# Patient Record
Sex: MALE | Race: WHITE | NOT HISPANIC OR LATINO | URBAN - METROPOLITAN AREA
[De-identification: names, ages, dates, MRNs, and addresses within clinical notes are randomized per-mention and may not be internally consistent; named-entity substitution may affect disease eponyms.]

---

## 2017-08-24 ENCOUNTER — ON CAMPUS - OUTPATIENT (AMBULATORY)
Dept: URBAN - METROPOLITAN AREA HOSPITAL 2 | Facility: HOSPITAL | Age: 57
End: 2017-08-24
Payer: COMMERCIAL

## 2017-08-24 ENCOUNTER — OFFICE (AMBULATORY)
Dept: URBAN - METROPOLITAN AREA CLINIC 64 | Facility: CLINIC | Age: 57
End: 2017-08-24

## 2017-08-24 VITALS
DIASTOLIC BLOOD PRESSURE: 79 MMHG | OXYGEN SATURATION: 94 % | SYSTOLIC BLOOD PRESSURE: 127 MMHG | SYSTOLIC BLOOD PRESSURE: 92 MMHG | DIASTOLIC BLOOD PRESSURE: 65 MMHG | RESPIRATION RATE: 18 BRPM | HEART RATE: 71 BPM | OXYGEN SATURATION: 100 % | OXYGEN SATURATION: 96 % | OXYGEN SATURATION: 95 % | SYSTOLIC BLOOD PRESSURE: 113 MMHG | SYSTOLIC BLOOD PRESSURE: 129 MMHG | WEIGHT: 267 LBS | HEIGHT: 70 IN | SYSTOLIC BLOOD PRESSURE: 133 MMHG | HEART RATE: 70 BPM | SYSTOLIC BLOOD PRESSURE: 155 MMHG | HEART RATE: 75 BPM | SYSTOLIC BLOOD PRESSURE: 85 MMHG | DIASTOLIC BLOOD PRESSURE: 86 MMHG | DIASTOLIC BLOOD PRESSURE: 62 MMHG | DIASTOLIC BLOOD PRESSURE: 74 MMHG | SYSTOLIC BLOOD PRESSURE: 112 MMHG | SYSTOLIC BLOOD PRESSURE: 120 MMHG | DIASTOLIC BLOOD PRESSURE: 57 MMHG | RESPIRATION RATE: 17 BRPM | HEART RATE: 78 BPM | TEMPERATURE: 98 F | HEART RATE: 66 BPM | HEART RATE: 73 BPM | DIASTOLIC BLOOD PRESSURE: 77 MMHG | OXYGEN SATURATION: 99 % | RESPIRATION RATE: 16 BRPM | DIASTOLIC BLOOD PRESSURE: 90 MMHG | SYSTOLIC BLOOD PRESSURE: 123 MMHG | DIASTOLIC BLOOD PRESSURE: 41 MMHG | HEART RATE: 74 BPM

## 2017-08-24 DIAGNOSIS — D12.0 BENIGN NEOPLASM OF CECUM: ICD-10-CM

## 2017-08-24 DIAGNOSIS — K64.0 FIRST DEGREE HEMORRHOIDS: ICD-10-CM

## 2017-08-24 DIAGNOSIS — K57.32 DIVERTICULITIS OF LARGE INTESTINE WITHOUT PERFORATION OR ABS: ICD-10-CM

## 2017-08-24 DIAGNOSIS — K63.3 ULCER OF INTESTINE: ICD-10-CM

## 2017-08-24 DIAGNOSIS — D12.2 BENIGN NEOPLASM OF ASCENDING COLON: ICD-10-CM

## 2017-08-24 DIAGNOSIS — Z12.11 ENCOUNTER FOR SCREENING FOR MALIGNANT NEOPLASM OF COLON: ICD-10-CM

## 2017-08-24 DIAGNOSIS — K52.9 NONINFECTIVE GASTROENTERITIS AND COLITIS, UNSPECIFIED: ICD-10-CM

## 2017-08-24 LAB
GI HISTOLOGY: A. UNSPECIFIED: (no result)
GI HISTOLOGY: B. UNSPECIFIED: (no result)
GI HISTOLOGY: C. UNSPECIFIED: (no result)
GI HISTOLOGY: PDF REPORT: (no result)

## 2017-08-24 PROCEDURE — 45385 COLONOSCOPY W/LESION REMOVAL: CPT | Performed by: INTERNAL MEDICINE

## 2017-08-24 PROCEDURE — 88305 TISSUE EXAM BY PATHOLOGIST: CPT | Performed by: INTERNAL MEDICINE

## 2017-08-24 PROCEDURE — 45381 COLONOSCOPY SUBMUCOUS NJX: CPT | Performed by: INTERNAL MEDICINE

## 2017-08-24 PROCEDURE — 45380 COLONOSCOPY AND BIOPSY: CPT | Mod: 51,59 | Performed by: INTERNAL MEDICINE

## 2017-08-24 RX ORDER — CIPROFLOXACIN 500 MG/1
1000 TABLET, FILM COATED ORAL
Qty: 20 | Refills: 0 | Status: ACTIVE
Start: 2017-08-24

## 2017-08-24 RX ADMIN — PROPOFOL: 10 INJECTION, EMULSION INTRAVENOUS at 13:17

## 2017-11-10 ENCOUNTER — HOSPITAL ENCOUNTER (OUTPATIENT)
Dept: FAMILY MEDICINE CLINIC | Facility: CLINIC | Age: 57
Setting detail: SPECIMEN
Discharge: HOME | End: 2017-11-10
Attending: FAMILY MEDICINE | Admitting: FAMILY MEDICINE

## 2017-11-10 LAB
ALBUMIN SERPL-MCNC: 4 G/DL (ref 3.5–4.8)
ALBUMIN/GLOB SERPL: 1.4 {RATIO} (ref 1–1.7)
ALP SERPL-CCNC: 57 IU/L (ref 32–91)
ALT SERPL-CCNC: 31 IU/L (ref 17–63)
ANION GAP SERPL CALC-SCNC: 11.6 MMOL/L (ref 10–20)
AST SERPL-CCNC: 31 IU/L (ref 15–41)
BASOPHILS # BLD AUTO: 0.1 10*3/UL (ref 0–0.2)
BASOPHILS NFR BLD AUTO: 1 % (ref 0–2)
BILIRUB SERPL-MCNC: 1 MG/DL (ref 0.3–1.2)
BUN SERPL-MCNC: 15 MG/DL (ref 8–20)
BUN/CREAT SERPL: 13.6 (ref 6.2–20.3)
CALCIUM SERPL-MCNC: 9.1 MG/DL (ref 8.9–10.3)
CHLORIDE SERPL-SCNC: 108 MMOL/L (ref 101–111)
CHOLEST SERPL-MCNC: 227 MG/DL
CHOLEST/HDLC SERPL: 6.4 {RATIO}
CONV CO2: 22 MMOL/L (ref 22–32)
CONV LDL CHOLESTEROL DIRECT: 179 MG/DL (ref 0–100)
CONV TOTAL PROTEIN: 6.8 G/DL (ref 6.1–7.9)
CREAT UR-MCNC: 1.1 MG/DL (ref 0.7–1.2)
CRP SERPL-MCNC: 0.3 MG/DL (ref 0–0.7)
DIFFERENTIAL METHOD BLD: (no result)
EOSINOPHIL # BLD AUTO: 0.2 10*3/UL (ref 0–0.3)
EOSINOPHIL # BLD AUTO: 3 % (ref 0–3)
ERYTHROCYTE [DISTWIDTH] IN BLOOD BY AUTOMATED COUNT: 13.5 % (ref 11.5–14.5)
ERYTHROCYTE [SEDIMENTATION RATE] IN BLOOD BY WESTERGREN METHOD: 6 MM/HR (ref 0–20)
GLOBULIN UR ELPH-MCNC: 2.8 G/DL (ref 2.5–3.8)
GLUCOSE SERPL-MCNC: 146 MG/DL (ref 65–99)
HCT VFR BLD AUTO: 41.1 % (ref 40–54)
HDLC SERPL-MCNC: 36 MG/DL
HGB BLD-MCNC: 14.1 G/DL (ref 14–18)
LDLC/HDLC SERPL: 5 {RATIO}
LIPID INTERPRETATION: ABNORMAL
LYMPHOCYTES # BLD AUTO: 2.6 10*3/UL (ref 0.8–4.8)
LYMPHOCYTES NFR BLD AUTO: 39 % (ref 18–42)
MCH RBC QN AUTO: 31.2 PG (ref 26–32)
MCHC RBC AUTO-ENTMCNC: 34.2 G/DL (ref 32–36)
MCV RBC AUTO: 91 FL (ref 80–94)
MONOCYTES # BLD AUTO: 0.3 10*3/UL (ref 0.1–1.3)
MONOCYTES NFR BLD AUTO: 4 % (ref 2–11)
NEUTROPHILS # BLD AUTO: 3.6 10*3/UL (ref 2.3–8.6)
NEUTROPHILS NFR BLD AUTO: 53 % (ref 50–75)
NRBC BLD AUTO-RTO: 0 /100{WBCS}
NRBC/RBC NFR BLD MANUAL: 0 10*3/UL
PLATELET # BLD AUTO: 291 10*3/UL (ref 150–450)
PMV BLD AUTO: 9.5 FL (ref 7.4–10.4)
POTASSIUM SERPL-SCNC: 3.6 MMOL/L (ref 3.6–5.1)
RBC # BLD AUTO: 4.52 10*6/UL (ref 4.6–6)
SODIUM SERPL-SCNC: 138 MMOL/L (ref 136–144)
TRIGL SERPL-MCNC: 140 MG/DL
URATE SERPL-MCNC: 7.7 MG/DL (ref 4.8–8.7)
VLDLC SERPL CALC-MCNC: 12.6 MG/DL
WBC # BLD AUTO: 6.8 10*3/UL (ref 4.5–11.5)

## 2017-11-13 LAB
ANA SER QL IA: NORMAL
CHROMATIN AB SERPL-ACNC: <20 [IU]/ML (ref 0–20)

## 2017-11-14 LAB
ALBUMIN SERPL-MCNC: 3.9 G/DL (ref 3.5–4.8)
ALPHA1 GLOB FLD ELPH-MCNC: 0.2 GM/DL (ref 0.1–0.4)
ALPHA2 GLOB SERPL ELPH-MCNC: 0.7 GM/DL (ref 0.5–1)
B-GLOBULIN SERPL ELPH-MCNC: 1.3 GM/DL (ref 0.7–1.4)
CONV TOTAL PROTEIN: 6.9 G/DL (ref 6.1–7.9)
GAMMA GLOB SERPL ELPH-MCNC: 0.9 GM/DL (ref 0.6–1.6)
INSULIN SERPL-ACNC: NORMAL U[IU]/ML

## 2018-03-30 ENCOUNTER — HOSPITAL ENCOUNTER (OUTPATIENT)
Dept: CARDIOLOGY | Facility: HOSPITAL | Age: 58
Discharge: HOME | End: 2018-03-30
Attending: INTERNAL MEDICINE | Admitting: INTERNAL MEDICINE

## 2018-09-25 ENCOUNTER — HOSPITAL ENCOUNTER (OUTPATIENT)
Dept: OTHER | Facility: HOSPITAL | Age: 58
Discharge: HOME | End: 2018-09-25
Attending: SURGERY | Admitting: SURGERY

## 2018-12-10 ENCOUNTER — HOSPITAL ENCOUNTER (OUTPATIENT)
Dept: ORTHOPEDIC SURGERY | Facility: CLINIC | Age: 58
Discharge: HOME | End: 2018-12-10
Attending: ORTHOPAEDIC SURGERY | Admitting: ORTHOPAEDIC SURGERY

## 2019-07-31 PROBLEM — R12 HEARTBURN: Status: ACTIVE | Noted: 2018-09-17

## 2019-07-31 PROBLEM — E78.5 HYPERLIPIDEMIA: Status: ACTIVE | Noted: 2019-07-31

## 2019-07-31 PROBLEM — M19.91 PRIMARY LOCALIZED OSTEOARTHRITIS: Status: ACTIVE | Noted: 2018-12-10

## 2019-07-31 PROBLEM — M15.9 POLYOSTEOARTHRITIS: Status: ACTIVE | Noted: 2018-11-14

## 2019-07-31 PROBLEM — I25.10 CORONARY ARTERY DISEASE: Status: ACTIVE | Noted: 2019-07-31

## 2019-07-31 PROBLEM — I21.3 ST ELEVATION (STEMI) MYOCARDIAL INFARCTION: Status: ACTIVE | Noted: 2019-07-31

## 2019-07-31 PROBLEM — M17.9 OSTEOARTHRITIS OF KNEE: Status: ACTIVE | Noted: 2017-11-10

## 2019-07-31 PROBLEM — I10 HYPERTENSION: Status: ACTIVE | Noted: 2019-07-31

## 2019-07-31 PROBLEM — M25.569 KNEE PAIN: Status: ACTIVE | Noted: 2017-11-10

## 2019-07-31 PROBLEM — IMO0002 HYPOGONADISM: Status: ACTIVE | Noted: 2017-04-19

## 2019-07-31 PROBLEM — E66.9 OBESITY: Status: ACTIVE | Noted: 2018-08-01

## 2019-07-31 PROBLEM — M10.9 GOUT: Status: ACTIVE | Noted: 2017-11-10

## 2019-07-31 PROBLEM — Z01.818 PREOPERATIVE EVALUATION TO RULE OUT SURGICAL CONTRAINDICATION: Status: ACTIVE | Noted: 2018-09-25

## 2019-07-31 PROBLEM — E66.01 MORBID (SEVERE) OBESITY DUE TO EXCESS CALORIES: Status: ACTIVE | Noted: 2018-09-25

## 2019-07-31 RX ORDER — ROSUVASTATIN CALCIUM 10 MG/1
TABLET, COATED ORAL DAILY
COMMUNITY
Start: 2015-03-13 | End: 2019-09-19 | Stop reason: SDUPTHER

## 2019-07-31 RX ORDER — METOPROLOL TARTRATE 50 MG/1
TABLET, FILM COATED ORAL EVERY 12 HOURS
COMMUNITY
Start: 2015-03-13 | End: 2019-12-19 | Stop reason: SDUPTHER

## 2019-07-31 RX ORDER — MELOXICAM 7.5 MG/1
TABLET ORAL EVERY 24 HOURS
COMMUNITY
Start: 2019-01-15 | End: 2019-12-19 | Stop reason: SDUPTHER

## 2019-07-31 RX ORDER — AMLODIPINE BESYLATE 5 MG/1
TABLET ORAL EVERY 24 HOURS
COMMUNITY
Start: 2017-12-03 | End: 2019-10-21 | Stop reason: SDUPTHER

## 2019-07-31 RX ORDER — ESOMEPRAZOLE MAGNESIUM 40 MG/1
CAPSULE, DELAYED RELEASE ORAL DAILY
COMMUNITY
Start: 2015-03-13 | End: 2019-12-19 | Stop reason: SDUPTHER

## 2019-07-31 RX ORDER — CHLORAL HYDRATE 500 MG
1 CAPSULE ORAL EVERY 12 HOURS
COMMUNITY
Start: 2018-03-22 | End: 2020-06-25

## 2019-07-31 RX ORDER — ASPIRIN 325 MG
325 TABLET ORAL NIGHTLY
COMMUNITY
Start: 2014-02-05

## 2019-08-15 ENCOUNTER — OFFICE VISIT (OUTPATIENT)
Dept: CARDIOLOGY | Facility: CLINIC | Age: 59
End: 2019-08-15

## 2019-08-15 VITALS
BODY MASS INDEX: 43.09 KG/M2 | WEIGHT: 291.8 LBS | HEART RATE: 89 BPM | SYSTOLIC BLOOD PRESSURE: 120 MMHG | OXYGEN SATURATION: 96 % | DIASTOLIC BLOOD PRESSURE: 75 MMHG

## 2019-08-15 DIAGNOSIS — I25.708 CORONARY ARTERY DISEASE OF BYPASS GRAFT OF NATIVE HEART WITH STABLE ANGINA PECTORIS (HCC): Primary | ICD-10-CM

## 2019-08-15 DIAGNOSIS — I10 ESSENTIAL HYPERTENSION: ICD-10-CM

## 2019-08-15 DIAGNOSIS — E78.00 PURE HYPERCHOLESTEROLEMIA: ICD-10-CM

## 2019-08-15 PROCEDURE — 99213 OFFICE O/P EST LOW 20 MIN: CPT | Performed by: INTERNAL MEDICINE

## 2019-08-15 NOTE — PROGRESS NOTES
Subjective:     Encounter Date:08/15/2019      Patient ID: Flaco Yan is a 59 y.o. male.    Chief Complaint:  History of Present Illness 59-year-old white male with history of coronary artery disease status post coronary artery bypass surgery history of hypertension hyperlipidemia presents to my office for follow-up.  Patient has sleep apnea and does not use a CPAP machine.  Patient does not have any symptoms of chest pain but has some shortness of breath with exertion.  Complains of any PND orthopnea.  No palpitations dizziness syncope or swelling of the feet.  Patient has been taking all the medicines regularly.  He does not smoke.  He is not able to exercise very well.    The following portions of the patient's history were reviewed and updated as appropriate: allergies, current medications, past family history, past medical history, past social history, past surgical history and problem list.  Past Medical History:   Diagnosis Date   • Coronary artery disease    • Hyperlipidemia    • Hypertension      History reviewed. No pertinent surgical history.  /75 (BP Location: Left arm, Patient Position: Sitting)   Pulse 89   Wt 132 kg (291 lb 12.8 oz)   SpO2 96%   BMI 43.09 kg/m²   History reviewed. No pertinent family history.    Current Outpatient Medications:   •  amLODIPine (NORVASC) 5 MG tablet, Daily., Disp: , Rfl:   •  aspirin 325 MG tablet, Take  by mouth Daily., Disp: , Rfl:   •  esomeprazole (NEXIUM) 20 MG capsule, NEXIUM 22.5 MG CPDR (ESOMEPRAZOLE MAGNESIUM) OTC, Disp: , Rfl:   •  metoprolol tartrate (LOPRESSOR) 50 MG tablet, Every 12 (Twelve) Hours., Disp: , Rfl:   •  Omega-3 Fatty Acids (FISH OIL) 1000 MG capsule capsule, 1 capsule Every 12 (Twelve) Hours., Disp: , Rfl:   •  esomeprazole (NEXIUM) 40 MG capsule, Take  by mouth Daily., Disp: , Rfl:   •  meloxicam (MOBIC) 7.5 MG tablet, Daily., Disp: , Rfl:   •  rosuvastatin (CRESTOR) 10 MG tablet, Take  by mouth Daily., Disp: , Rfl:   No  Known Allergies  Social History     Socioeconomic History   • Marital status:      Spouse name: Not on file   • Number of children: Not on file   • Years of education: Not on file   • Highest education level: Not on file   Tobacco Use   • Smoking status: Former Smoker   Substance and Sexual Activity   • Alcohol use: No     Frequency: Never     Review of Systems   Constitution: Positive for malaise/fatigue. Negative for fever.   Cardiovascular: Negative for chest pain, dyspnea on exertion, leg swelling and palpitations.   Respiratory: Negative for cough and shortness of breath.    Skin: Negative for rash.   Gastrointestinal: Negative for abdominal pain, nausea and vomiting.   Neurological: Negative for dizziness, focal weakness, headaches and light-headedness.   All other systems reviewed and are negative.             Objective:     Physical Exam   Constitutional: He appears well-developed and well-nourished.   HENT:   Head: Normocephalic and atraumatic.   Eyes: Conjunctivae are normal. No scleral icterus.   Neck: Normal range of motion. Neck supple. No JVD present. Carotid bruit is not present.   Cardiovascular: Normal rate, regular rhythm, S1 normal, S2 normal, normal heart sounds and intact distal pulses. PMI is not displaced.   Pulmonary/Chest: Effort normal and breath sounds normal. He has no wheezes. He has no rales.   Abdominal: Soft. Bowel sounds are normal.   Neurological: He is alert. He has normal strength.   Skin: Skin is warm and dry. No rash noted.     Procedures    Lab Review:       Assessment:          Diagnosis Plan   1. Coronary artery disease of bypass graft of native heart with stable angina pectoris (CMS/HCC)     2. Essential hypertension     3. Pure hypercholesterolemia       #4 obstructive sleep apnea     Plan:       Patient had coronary bypass surgery with a LIMA to LAD and saphenous graft to the marginal branch and the RCA.  Patient has normal LV function per  Patient has sleep apnea  but does not use a CPAP machine.  Patient has hyperlipidemia and does not use statins very well.  He uses over-the-counter medicines per  Patient will have lipid profile performed and will be started on a statin.  Continue current medicines and follow-up in 6 months

## 2019-09-19 ENCOUNTER — OFFICE VISIT (OUTPATIENT)
Dept: FAMILY MEDICINE CLINIC | Facility: CLINIC | Age: 59
End: 2019-09-19

## 2019-09-19 VITALS
HEART RATE: 69 BPM | SYSTOLIC BLOOD PRESSURE: 158 MMHG | RESPIRATION RATE: 18 BRPM | DIASTOLIC BLOOD PRESSURE: 102 MMHG | WEIGHT: 290 LBS | BODY MASS INDEX: 41.52 KG/M2 | OXYGEN SATURATION: 96 % | TEMPERATURE: 97.9 F | HEIGHT: 70 IN

## 2019-09-19 DIAGNOSIS — E78.01 FAMILIAL HYPERCHOLESTEROLEMIA: Primary | ICD-10-CM

## 2019-09-19 DIAGNOSIS — E34.9 TESTOSTERONE DEFICIENCY: ICD-10-CM

## 2019-09-19 PROCEDURE — 99213 OFFICE O/P EST LOW 20 MIN: CPT | Performed by: FAMILY MEDICINE

## 2019-09-19 RX ORDER — ROSUVASTATIN CALCIUM 5 MG/1
5 TABLET, COATED ORAL NIGHTLY
Qty: 30 TABLET | Refills: 11 | Status: SHIPPED | OUTPATIENT
Start: 2019-09-19 | End: 2020-09-11

## 2019-09-19 RX ORDER — TADALAFIL 20 MG/1
20 TABLET ORAL DAILY PRN
Qty: 5 TABLET | Refills: 5 | Status: SHIPPED | OUTPATIENT
Start: 2019-09-19 | End: 2021-06-28

## 2019-09-19 NOTE — PROGRESS NOTES
Rooming Tab(CC,VS,Pt Hx,Fall Screen)  Chief Complaint   Patient presents with   • discuss cardiology labs       Subjective    Cardiology sent him over because he stopped the chol meds a year ago.  He is needing to get back on it.    Also needs to try some testosterone and /or cialis for ED.   We will discuss options and risks and benefits of both.             I have reviewed and updated his medications, medical history and problem list during today's office visit.     Patient Care Team:  Mike Redd MD as PCP - General (Family Medicine)    Problem List Tab  Medications Tab  Synopsis Tab  Chart Review Tab  Care Everywhere Tab  Immunizations Tab  Patient History Tab    Social History     Tobacco Use   • Smoking status: Former Smoker   Substance Use Topics   • Alcohol use: No     Frequency: Never       Review of Systems   Constitutional: Negative.  Negative for appetite change, diaphoresis, fatigue, fever and unexpected weight loss.   HENT: Negative.  Negative for congestion, sinus pressure and sore throat.    Eyes: Negative.  Negative for blurred vision, double vision and itching.   Respiratory: Negative.  Negative for cough and shortness of breath.    Cardiovascular: Negative.  Negative for chest pain and palpitations.   Gastrointestinal: Negative.    Genitourinary: Negative for difficulty urinating, frequency and urinary incontinence.   Musculoskeletal: Positive for arthralgias and myalgias. Negative for back pain, joint swelling and neck pain.   Skin: Negative for dry skin and rash.   Allergic/Immunologic: Positive for environmental allergies.   Neurological: Negative.  Negative for dizziness, tremors and syncope.   Hematological: Negative.  Does not bruise/bleed easily.   Psychiatric/Behavioral: Negative.  Negative for depressed mood. The patient is not nervous/anxious.    All other systems reviewed and are negative.      Objective     Rooming Tab(CC,VS,Pt Hx,Fall Screen)  BP (!) 158/102 (BP Location: Left  "arm, Patient Position: Sitting, Cuff Size: Large Adult)   Pulse 69   Temp 97.9 °F (36.6 °C) (Oral)   Resp 18   Ht 177.8 cm (70\")   Wt 132 kg (290 lb)   SpO2 96%   BMI 41.61 kg/m²     Body mass index is 41.61 kg/m².    Physical Exam   Constitutional: He is oriented to person, place, and time. He appears well-developed and well-nourished.   HENT:   Head: Normocephalic and atraumatic.   Right Ear: External ear normal.   Left Ear: External ear normal.   Nose: Nose normal.   Mouth/Throat: Oropharynx is clear and moist.   Eyes: Conjunctivae and EOM are normal. Pupils are equal, round, and reactive to light.   Neck: Normal range of motion. Neck supple.   Cardiovascular: Normal rate, regular rhythm, normal heart sounds and intact distal pulses.   Pulmonary/Chest: Effort normal and breath sounds normal.   Abdominal: Soft. Bowel sounds are normal.   overweight   Musculoskeletal: Normal range of motion.   Neurological: He is alert and oriented to person, place, and time.   Skin: Skin is warm and dry.   Psychiatric: He has a normal mood and affect. His behavior is normal. Judgment and thought content normal.   Vitals reviewed.       Statin Choice Calculator  Data Reviewed:                   Assessment/Plan   Order Review Tab  Health Maintenance Tab  Patient Plan/Order Tab  Diagnoses and all orders for this visit:    1. Familial hypercholesterolemia (Primary)  Assessment & Plan:  Lipid abnormalities are improving with treatment.  Nutritional counseling was provided. and The patient was referred to the dietician.  Lipids will be reassessed in 3 months.  Low carb diet  Crestor 5mg qd.      2. Testosterone deficiency  Assessment & Plan:  Trial with  20mg Cialis.     If not helpful then try testosterone IM along with it      Other orders  -     rosuvastatin (CRESTOR) 5 MG tablet; Take 1 tablet by mouth Every Night for 30 days.  Dispense: 30 tablet; Refill: 11  -     tadalafil (CIALIS) 20 MG tablet; Take 1 tablet by mouth " Daily As Needed for erectile dysfunction.  Dispense: 5 tablet; Refill: 5      Wrapup Tab  Return in about 3 months (around 12/19/2019).

## 2019-09-19 NOTE — ASSESSMENT & PLAN NOTE
Lipid abnormalities are improving with treatment.  Nutritional counseling was provided. and The patient was referred to the dietician.  Lipids will be reassessed in 3 months.  Low carb diet  Crestor 5mg qd.

## 2019-10-21 RX ORDER — AMLODIPINE BESYLATE 5 MG/1
5 TABLET ORAL EVERY 24 HOURS
Qty: 90 TABLET | Refills: 3 | Status: SHIPPED | OUTPATIENT
Start: 2019-10-21 | End: 2019-10-21 | Stop reason: SDUPTHER

## 2019-10-21 RX ORDER — AMLODIPINE BESYLATE 5 MG/1
5 TABLET ORAL EVERY 24 HOURS
Qty: 90 TABLET | Refills: 3 | Status: SHIPPED | OUTPATIENT
Start: 2019-10-21 | End: 2021-01-25

## 2019-10-30 ENCOUNTER — TELEPHONE (OUTPATIENT)
Dept: FAMILY MEDICINE CLINIC | Facility: CLINIC | Age: 59
End: 2019-10-30

## 2019-10-30 NOTE — TELEPHONE ENCOUNTER
Patient went to get his labs done this morning for his appt. Tomorrow with Dr. Redd, but he was not fasting so he didn't have them done. He is asking if he should keep his appt. Tomorrow at 8:30 am. Neda, can you let me know? He is scheduled for 6 week F/U. Thank you.

## 2019-10-30 NOTE — TELEPHONE ENCOUNTER
According to the ov in sept he was to have lipid level check in 3 months. Not sure what the appt was for. He is over due for a physical if he wants to schedule one in the future.

## 2019-12-19 ENCOUNTER — OFFICE VISIT (OUTPATIENT)
Dept: FAMILY MEDICINE CLINIC | Facility: CLINIC | Age: 59
End: 2019-12-19

## 2019-12-19 VITALS
DIASTOLIC BLOOD PRESSURE: 90 MMHG | BODY MASS INDEX: 42.66 KG/M2 | HEIGHT: 70 IN | TEMPERATURE: 97.8 F | WEIGHT: 298 LBS | HEART RATE: 72 BPM | RESPIRATION RATE: 18 BRPM | OXYGEN SATURATION: 99 % | SYSTOLIC BLOOD PRESSURE: 150 MMHG

## 2019-12-19 DIAGNOSIS — E78.01 FAMILIAL HYPERCHOLESTEROLEMIA: Primary | ICD-10-CM

## 2019-12-19 DIAGNOSIS — I10 ESSENTIAL HYPERTENSION: ICD-10-CM

## 2019-12-19 DIAGNOSIS — E66.3 OVERWEIGHT: ICD-10-CM

## 2019-12-19 PROCEDURE — 99213 OFFICE O/P EST LOW 20 MIN: CPT | Performed by: FAMILY MEDICINE

## 2019-12-19 RX ORDER — MELOXICAM 15 MG/1
TABLET ORAL
COMMUNITY
Start: 2019-11-12 | End: 2020-04-02 | Stop reason: SDUPTHER

## 2019-12-19 RX ORDER — METOPROLOL TARTRATE 50 MG/1
50 TABLET, FILM COATED ORAL 2 TIMES DAILY
Qty: 60 TABLET | Refills: 6 | Status: SHIPPED | OUTPATIENT
Start: 2019-12-19 | End: 2022-12-19

## 2019-12-19 RX ORDER — ROSUVASTATIN CALCIUM 5 MG/1
TABLET, COATED ORAL
COMMUNITY
Start: 2019-11-17 | End: 2020-06-11

## 2019-12-19 NOTE — ASSESSMENT & PLAN NOTE
Obesity is unchanged.  Discussed the patient's BMI.  The BMI is above average; BMI management plan is completed.  General weight loss/lifestyle modification strategies discussed (elicit support from others; identify saboteurs; non-food rewards, etc).  Behavioral treatment: commercial programs (weight watchers).  Diet interventions: low calorie (1000 kCal/d) deficit diet.  Informal exercise measures discussed, e.g. taking stairs instead of elevator.  Regular aerobic exercise program discussed.  Pharmacotherapy as ordered.

## 2019-12-19 NOTE — ASSESSMENT & PLAN NOTE
Hypertension is improving with treatment.  Continue current treatment regimen.  Dietary sodium restriction.  Weight loss.  Regular aerobic exercise.  Stop smoking.  Continue current medications.  Medication changes per orders.  Blood pressure will be reassessed in 3 months.    Cont meds.  I need some readings out of the office.  If he is really running high out of the office I will adjust his dose.

## 2019-12-19 NOTE — PROGRESS NOTES
Rooming Tab(CC,VS,Pt Hx,Fall Screen)  Chief Complaint   Patient presents with   • Hyperlipidemia     f/u       Subjective    Doing well.  He is aching again from the crestor 5mg qd.   They have his chol down to perfect though.   We need to find a way to get him to tolerate this.      I have reviewed and updated his medications, medical history and problem list during today's office visit.     Patient Care Team:  Mike Redd MD as PCP - General (Family Medicine)    Problem List Tab  Medications Tab  Synopsis Tab  Chart Review Tab  Care Everywhere Tab  Immunizations Tab  Patient History Tab    Social History     Tobacco Use   • Smoking status: Former Smoker     Last attempt to quit: 1999     Years since quittin.0   • Smokeless tobacco: Never Used   Substance Use Topics   • Alcohol use: No     Frequency: Never       Review of Systems   Constitutional: Negative.  Negative for diaphoresis, fatigue and fever.   HENT: Negative.  Negative for congestion, hearing loss, sinus pressure, tinnitus and trouble swallowing.    Eyes: Negative.    Respiratory: Negative.  Negative for cough, choking, chest tightness, shortness of breath and wheezing.    Cardiovascular: Negative.  Negative for chest pain and palpitations.   Gastrointestinal: Negative.  Negative for abdominal distention, abdominal pain and GERD.   Endocrine: Negative.    Genitourinary: Negative.  Negative for frequency.   Musculoskeletal: Negative.  Negative for arthralgias, gait problem, joint swelling, myalgias and neck stiffness.   Skin: Negative.  Negative for rash.   Allergic/Immunologic: Negative.  Negative for environmental allergies.   Neurological: Negative.  Negative for syncope and speech difficulty.   Hematological: Negative.  Negative for adenopathy.   Psychiatric/Behavioral: Negative for stress.   All other systems reviewed and are negative.      Objective     Rooming Tab(CC,VS,Pt Hx,Fall Screen)  /90 (BP Location: Left arm)    "Pulse 72   Temp 97.8 °F (36.6 °C) (Oral)   Resp 18   Ht 177.8 cm (70\")   Wt 135 kg (298 lb)   SpO2 99%   BMI 42.76 kg/m²     Body mass index is 42.76 kg/m².    Physical Exam   Constitutional: He is oriented to person, place, and time. He appears well-developed and well-nourished.   HENT:   Head: Normocephalic and atraumatic.   Right Ear: External ear normal.   Left Ear: External ear normal.   Nose: Nose normal.   Mouth/Throat: Oropharynx is clear and moist. No oropharyngeal exudate.   Eyes: Pupils are equal, round, and reactive to light. Conjunctivae and EOM are normal. Right eye exhibits no discharge. Left eye exhibits no discharge. No scleral icterus.   Neck: Normal range of motion. Neck supple. No JVD present. No thyromegaly present.   Cardiovascular: Normal rate, regular rhythm, normal heart sounds and intact distal pulses.   No murmur heard.  Pulmonary/Chest: Effort normal and breath sounds normal. No respiratory distress. He has no wheezes. He has no rales. He exhibits no tenderness.   Abdominal: Soft. Bowel sounds are normal. He exhibits no distension. There is no tenderness.   Genitourinary: Rectal exam shows guaiac negative stool.   Musculoskeletal: Normal range of motion. He exhibits no edema, tenderness or deformity.   Lymphadenopathy:     He has no cervical adenopathy.   Neurological: He is alert and oriented to person, place, and time.   Skin: Skin is warm and dry.   Psychiatric: He has a normal mood and affect. His behavior is normal. Judgment and thought content normal.   Vitals reviewed.       Statin Choice Calculator  Data Reviewed:                   Assessment/Plan   Order Review Tab  Health Maintenance Tab  Patient Plan/Order Tab  Diagnoses and all orders for this visit:    1. Familial hypercholesterolemia (Primary)  Assessment & Plan:  Lipid abnormalities are improving with treatment.  Nutritional counseling was provided., The patient is on low carb diet.   Crestor is working great but he " is very aching.       We will try to cut his dose to tiw.  The meds work but we need to find a way to tolerate them.      2. Essential hypertension  Assessment & Plan:  Hypertension is improving with treatment.  Continue current treatment regimen.  Dietary sodium restriction.  Weight loss.  Regular aerobic exercise.  Stop smoking.  Continue current medications.  Medication changes per orders.  Blood pressure will be reassessed in 3 months.    Cont meds.  I need some readings out of the office.  If he is really running high out of the office I will adjust his dose.      3. Overweight  Assessment & Plan:  Obesity is unchanged.  Discussed the patient's BMI.  The BMI is above average; BMI management plan is completed.  General weight loss/lifestyle modification strategies discussed (elicit support from others; identify saboteurs; non-food rewards, etc).  Behavioral treatment: commercial programs (weight watchers).  Diet interventions: low calorie (1000 kCal/d) deficit diet.  Informal exercise measures discussed, e.g. taking stairs instead of elevator.  Regular aerobic exercise program discussed.  Pharmacotherapy as ordered.      Other orders  -     metoprolol tartrate (LOPRESSOR) 50 MG tablet; Take 1 tablet by mouth 2 (Two) Times a Day for 30 days.  Dispense: 60 tablet; Refill: 6      Wrapup Tab  Return in about 4 months (around 4/19/2020).

## 2019-12-19 NOTE — ASSESSMENT & PLAN NOTE
Lipid abnormalities are improving with treatment.  Nutritional counseling was provided., The patient is on low carb diet.   Crestor is working great but he is very aching.       We will try to cut his dose to tiw.  The meds work but we need to find a way to tolerate them.

## 2020-03-04 ENCOUNTER — HOSPITAL ENCOUNTER (EMERGENCY)
Facility: HOSPITAL | Age: 60
Discharge: HOME | End: 2020-03-04
Attending: EMERGENCY MEDICINE | Admitting: EMERGENCY MEDICINE

## 2020-03-04 ENCOUNTER — TELEPHONE (OUTPATIENT)
Dept: FAMILY MEDICINE CLINIC | Facility: CLINIC | Age: 60
End: 2020-03-04

## 2020-03-04 VITALS
TEMPERATURE: 97.5 F | WEIGHT: 299.38 LBS | BODY MASS INDEX: 42.86 KG/M2 | HEIGHT: 70 IN | RESPIRATION RATE: 17 BRPM | DIASTOLIC BLOOD PRESSURE: 95 MMHG | SYSTOLIC BLOOD PRESSURE: 152 MMHG | HEART RATE: 80 BPM | OXYGEN SATURATION: 99 %

## 2020-03-04 DIAGNOSIS — G56.22 ULNAR NEUROPATHY AT ELBOW OF LEFT UPPER EXTREMITY: Primary | ICD-10-CM

## 2020-03-04 LAB — GLUCOSE BLDC GLUCOMTR-MCNC: 108 MG/DL (ref 70–105)

## 2020-03-04 PROCEDURE — 99283 EMERGENCY DEPT VISIT LOW MDM: CPT

## 2020-03-04 PROCEDURE — 82962 GLUCOSE BLOOD TEST: CPT

## 2020-03-04 NOTE — ED PROVIDER NOTES
Subjective   60-year-old male felt well prior to going to sleep, woke up with left arm numbness in ulnar distribution, no weakness, no other associated symptoms, moderate.          Review of Systems   Neurological:        As per HPI, otherwise negative   All other systems reviewed and are negative.      Past Medical History:   Diagnosis Date   • Coronary artery disease    • Hyperlipidemia    • Hypertension    • Obesity        No Known Allergies    Past Surgical History:   Procedure Laterality Date   • CORONARY ARTERY BYPASS GRAFT     • CORONARY STENT PLACEMENT     • FINGER SURGERY         Family History   Problem Relation Age of Onset   • Stroke Mother        Social History     Socioeconomic History   • Marital status: Single     Spouse name: Not on file   • Number of children: Not on file   • Years of education: Not on file   • Highest education level: Not on file   Tobacco Use   • Smoking status: Former Smoker     Last attempt to quit: 1999     Years since quittin.2   • Smokeless tobacco: Never Used   Substance and Sexual Activity   • Alcohol use: No     Frequency: Never   • Drug use: No   • Sexual activity: Defer           Objective   Physical Exam   Constitutional: He is oriented to person, place, and time. He appears well-developed and well-nourished.   HENT:   Head: Normocephalic and atraumatic.   Mouth/Throat: Oropharynx is clear and moist.   Eyes: Pupils are equal, round, and reactive to light. Conjunctivae and EOM are normal.   Neck: Normal range of motion. Neck supple.   Cardiovascular: Normal rate, regular rhythm, normal heart sounds and intact distal pulses.   Pulmonary/Chest: Effort normal and breath sounds normal.   Musculoskeletal: Normal range of motion. He exhibits no edema or tenderness.   Neurological: He is alert and oriented to person, place, and time. No cranial nerve deficit.   Motor and sensation intact with the exception of mild decreased touch from elbow to fourth and fifth  digits and ulnar distribution, otherwise sensation intact left upper extremity, left hand is warm and well-perfused, no pronator drift, normal gait.   Skin: Skin is warm and dry. Capillary refill takes less than 2 seconds.   Psychiatric: He has a normal mood and affect. His behavior is normal.       Procedures           ED Course                                           MDM  Number of Diagnoses or Management Options  Ulnar neuropathy at elbow of left upper extremity:   Diagnosis management comments: Patient evaluated initially and then reevaluated with no change or progression of symptoms.  Stroke symptoms reviewed, will return if any present if there is any change in symptoms.  Otherwise patient is to avoid elbow compression and sleeping on left arm and follow-up with hand surgeon.      Final diagnoses:   Ulnar neuropathy at elbow of left upper extremity            Navid Campos MD  03/04/20 0315

## 2020-03-04 NOTE — ED NOTES
Pt taking back to see Andres BERG MD gave verbal order pt was okay to go to waiting room that is sounded to be an ulnar nerve issue, pt's blood sugar was 108 in triage     Beth Montejo, RN  03/04/20 0128       Beth Montejo, MALORIE  03/04/20 0128

## 2020-03-04 NOTE — TELEPHONE ENCOUNTER
Pt was seen at [facility] ER last night for arm numbness. Was told at ER he slept wrong and nothing of concern. He is still experiencing a weird sensation that starts right below ribs on left side and it works its way up in chest and then goes down left arm. Not really tingling, but a numbness like when you get Lidocaine. Asking for direction. Thank you.

## 2020-03-06 ENCOUNTER — OFFICE VISIT (OUTPATIENT)
Dept: FAMILY MEDICINE CLINIC | Facility: CLINIC | Age: 60
End: 2020-03-06

## 2020-03-06 VITALS
BODY MASS INDEX: 43.62 KG/M2 | DIASTOLIC BLOOD PRESSURE: 84 MMHG | OXYGEN SATURATION: 97 % | HEART RATE: 94 BPM | WEIGHT: 304 LBS | RESPIRATION RATE: 16 BRPM | TEMPERATURE: 98.6 F | SYSTOLIC BLOOD PRESSURE: 138 MMHG

## 2020-03-06 DIAGNOSIS — R20.0 NUMBNESS: Primary | ICD-10-CM

## 2020-03-06 DIAGNOSIS — R07.89 ATYPICAL CHEST PAIN: ICD-10-CM

## 2020-03-06 DIAGNOSIS — R20.0 NUMBNESS: ICD-10-CM

## 2020-03-06 PROCEDURE — 93000 ELECTROCARDIOGRAM COMPLETE: CPT | Performed by: INTERNAL MEDICINE

## 2020-03-06 PROCEDURE — 99214 OFFICE O/P EST MOD 30 MIN: CPT | Performed by: INTERNAL MEDICINE

## 2020-03-06 RX ORDER — METHYLPREDNISOLONE 4 MG/1
TABLET ORAL
Qty: 21 EACH | Refills: 0 | Status: SHIPPED | OUTPATIENT
Start: 2020-03-06 | End: 2020-06-11

## 2020-03-06 RX ORDER — METOPROLOL TARTRATE 50 MG/1
50 TABLET, FILM COATED ORAL 2 TIMES DAILY
COMMUNITY
Start: 2020-02-15 | End: 2020-06-15

## 2020-03-06 NOTE — PROGRESS NOTES
Rooming Tab(CC,VS,Pt Hx,Fall Screen)  Chief Complaint   Patient presents with   • Numbness     lt side       Subjective   Pt here for ED follow up- was seen on Tuesday with left  Arm numbness- tingling- drove self to hospital and could not feel steering wheel with left had. 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   still with abnormal feeling. And also on the left chest.   I have reviewed and updated his medications, medical history and problem list during today's office visit.     Patient Care Team:  Mike Redd MD as PCP - General (Family Medicine)    Problem List Tab  Medications Tab  Synopsis Tab  Chart Review Tab  Care Everywhere Tab  Immunizations Tab  Patient History Tab    Social History     Tobacco Use   • Smoking status: Former Smoker     Last attempt to quit: 1999     Years since quittin.2   • Smokeless tobacco: Never Used   Substance Use Topics   • Alcohol use: No     Frequency: Never       Review of Systems   Constitutional: Negative for fatigue and fever.   HENT: Negative for congestion.    Respiratory: Negative for apnea, cough and wheezing.    Cardiovascular: Negative for chest pain.   Gastrointestinal: Negative for abdominal distention.   Musculoskeletal: Positive for neck pain and neck stiffness.   Neurological: Positive for numbness. Negative for syncope.   Psychiatric/Behavioral: Negative for behavioral problems.       Objective     Rooming Tab(CC,VS,Pt Hx,Fall Screen)  /84 (BP Location: Left arm, Patient Position: Sitting, Cuff Size: Adult)   Pulse 94   Temp 98.6 °F (37 °C) (Oral)   Resp 16   Wt (!) 138 kg (304 lb)   SpO2 97%   BMI 43.62 kg/m²     Body mass index is 43.62 kg/m².    Physical Exam   Constitutional: He is oriented to person, place, and time. He appears well-developed and well-nourished.   HENT:   Head: Normocephalic and atraumatic.    Right Ear: Tympanic membrane normal.   Left Ear: Tympanic membrane normal.   Eyes: Pupils are equal, round, and reactive to light.   Neck: Normal range of motion. Neck supple.   Cardiovascular: Normal rate and regular rhythm.   No murmur heard.  Pulmonary/Chest: Effort normal and breath sounds normal.   Abdominal: Soft. Bowel sounds are normal. He exhibits no distension.   Neurological: He is oriented to person, place, and time. A sensory deficit is present.   Skin: Capillary refill takes less than 2 seconds.   Psychiatric: He has a normal mood and affect.   Nursing note and vitals reviewed.       Statin Choice Calculator  Data Reviewed:        ECG 12 Lead  Date/Time: 3/6/2020 9:59 AM  Performed by: Gissel Mirza MD  Authorized by: Gissel Mirza MD   Comparison: not compared with previous ECG   Previous ECG: no previous ECG available  Rhythm: sinus rhythm  Rate: normal  Conduction: conduction normal  ST Segments: ST segments normal  Other findings: non-specific ST-T wave changes    Clinical impression: normal ECG                      Assessment/Plan   Order Review Tab  Health Maintenance Tab  Patient Plan/Order Tab  Diagnoses and all orders for this visit:    1. Numbness (Primary)  Comments:  will check mRI and treat with steroids for the symptoms  Orders:  -     ECG 12 Lead  -     XR Spine Cervical Complete 4 or 5 View; Future  -     MRI Cervical Spine Without Contrast; Future    2. Atypical chest pain  Comments:  check EKG but mostly from nerve pain it appears    Other orders  -     Cancel: ECG 12 Lead  -     methylPREDNISolone (MEDROL, VASU,) 4 MG tablet; Take as directed on package instructions.  Dispense: 21 each; Refill: 0        Wrapup Tab  Return if symptoms worsen or fail to improve.       They were informed of the diagnosis and treatment plan and directed to f/u for any further problems or concerns.

## 2020-03-06 NOTE — PROGRESS NOTES
Rooming Tab(CC,VS,Pt Hx,Fall Screen)  No chief complaint on file.      Subjective     I have reviewed and updated his medications, medical history and problem list during today's office visit.     Patient Care Team:  Mike Redd MD as PCP - General (Family Medicine)    Problem List Tab  Medications Tab  Synopsis Tab  Chart Review Tab  Care Everywhere Tab  Immunizations Tab  Patient History Tab    Social History     Tobacco Use   • Smoking status: Former Smoker     Last attempt to quit: 1999     Years since quittin.2   • Smokeless tobacco: Never Used   Substance Use Topics   • Alcohol use: No     Frequency: Never       Review of Systems    Objective     Rooming Tab(CC,VS,Pt Hx,Fall Screen)  There were no vitals taken for this visit.    There is no height or weight on file to calculate BMI.    Physical Exam     Statin Choice Calculator  Data Reviewed:         {AMBULATORY LABS (Optional):68194}          Assessment/Plan   Order Review Tab  Health Maintenance Tab  Patient Plan/Order Tab  There are no diagnoses linked to this encounter.    Wrapup Tab  No follow-ups on file.       They were informed of the diagnosis and treatment plan and directed to f/u for any further problems or concerns.      During this visit for their annual exam, we reviewed their personal history, social history and family history.  We went over their medications and all the recommended health maintenence items for their age group. They were given the opportunity to ask questions and discuss other concerns.

## 2020-03-15 PROBLEM — R07.89 ATYPICAL CHEST PAIN: Status: ACTIVE | Noted: 2020-03-15

## 2020-03-15 PROBLEM — R20.0 NUMBNESS: Status: ACTIVE | Noted: 2020-03-15

## 2020-03-18 ENCOUNTER — TELEPHONE (OUTPATIENT)
Dept: FAMILY MEDICINE CLINIC | Facility: CLINIC | Age: 60
End: 2020-03-18

## 2020-03-18 DIAGNOSIS — F41.9 ANXIETY: Primary | ICD-10-CM

## 2020-03-18 RX ORDER — DIAZEPAM 10 MG/1
TABLET ORAL
Qty: 3 TABLET | Refills: 0 | Status: SHIPPED | OUTPATIENT
Start: 2020-03-18 | End: 2020-06-20

## 2020-03-18 NOTE — TELEPHONE ENCOUNTER
FLORIAN FROM MRI CALLED TO INFORM THE DOCTOR THAT TE PATIENT WAS UNABLE TO GET HIS MRI DONE TODAY. FLORIAN REPORTS THAT PATIENT WAS VERY NERVOUS AND COULD NOT GET PROCEDURE  DONE.  FLORIAN WAS CALLING TO SEE IF SOMETHING COULD BE [place]ED [place] [id number] [facility] [place] [facility] [address] [facility] [address] - [phone]  - [phone]  FOR THE PATIENT. FLORIAN ALSO STATED THAT THE PATIENT IS OVER 275 POUNDS AND NOT SURE IF 5MG WILL DO.      PLEASE ADVISE

## 2020-03-19 NOTE — TELEPHONE ENCOUNTER
Gave pt message that Valium has been sent to pharmacy. Pt was also told he would need a  to and from test when he takes this medication. He voiced understanding.

## 2020-03-19 NOTE — TELEPHONE ENCOUNTER
Tell Flaco we called in Valium to help him get through the Mri .   He will need someone to drive him home after using it.

## 2020-03-31 ENCOUNTER — TELEPHONE (OUTPATIENT)
Dept: FAMILY MEDICINE CLINIC | Facility: CLINIC | Age: 60
End: 2020-03-31

## 2020-04-01 DIAGNOSIS — R20.0 NUMBNESS: ICD-10-CM

## 2020-04-01 NOTE — TELEPHONE ENCOUNTER
Tell Flaco that his MRI is actually very normal.  He has some degenerative disc disease but he has no disc ruptures, no spinal stenosis, and no neuroforaminal narrowing anywhere along the cervical spine.  The pain he is having may be muscular or ligaments but it is not surgical.  We could try physical therapy or pain management depending on how bad he is feeling

## 2020-04-01 NOTE — TELEPHONE ENCOUNTER
Please review MRI and let me know if you would like to do a video visit or just call with results.

## 2020-04-02 ENCOUNTER — TELEPHONE (OUTPATIENT)
Dept: FAMILY MEDICINE CLINIC | Facility: CLINIC | Age: 60
End: 2020-04-02

## 2020-04-02 DIAGNOSIS — M25.562 CHRONIC PAIN OF LEFT KNEE: Primary | ICD-10-CM

## 2020-04-02 DIAGNOSIS — G54.0 NEUROGENIC THORACIC OUTLET SYNDROME OF LEFT BRACHIAL PLEXUS: Primary | ICD-10-CM

## 2020-04-02 DIAGNOSIS — G89.29 CHRONIC PAIN OF LEFT KNEE: Primary | ICD-10-CM

## 2020-04-02 RX ORDER — OXYCODONE AND ACETAMINOPHEN 10; 325 MG/1; MG/1
1-2 TABLET ORAL EVERY 6 HOURS PRN
Qty: 60 TABLET | Refills: 0 | Status: SHIPPED | OUTPATIENT
Start: 2020-04-02 | End: 2020-05-02

## 2020-04-02 RX ORDER — MELOXICAM 15 MG/1
TABLET ORAL
Status: CANCELLED | OUTPATIENT
Start: 2020-04-02

## 2020-04-02 RX ORDER — MELOXICAM 15 MG/1
15 TABLET ORAL DAILY
Qty: 30 TABLET | Refills: 6 | Status: SHIPPED | OUTPATIENT
Start: 2020-04-02 | End: 2020-05-02

## 2020-04-02 NOTE — TELEPHONE ENCOUNTER
Pt called and since his MRI was normal, he is questioning what P.T. will do to help him. His left leg is hurting so bad. He is sleeping only 1.5 hours/night. Meloxicam is not helping. He needs something stronger than Tylenol to help with pain so he can sleep. He did state his left quad goes numb. Thank you.

## 2020-04-02 NOTE — TELEPHONE ENCOUNTER
Spoke to pt. He will try PT when it reopens.  There is no pain just numbness as if he has been given novocain. He was given prednisone on 3/6/20 which seems to help since he has not had this happen this week.    He would also like meloxicam sent in for refill. For knee pain.

## 2020-04-03 ENCOUNTER — TELEPHONE (OUTPATIENT)
Dept: FAMILY MEDICINE CLINIC | Facility: CLINIC | Age: 60
End: 2020-04-03

## 2020-04-03 NOTE — TELEPHONE ENCOUNTER
1.  I scheduled Flaco for a CT scan of his chest to make sure he did not have a thoracic outlet syndrome-neurogenic  2.  Flaco will come by for some lab test that I have ordered in the chart  3.  I called in [facility] 10/325 #60 for his neuropathic pain in the left arm and his degenerative arthritic pain in the left knee.    4.   x-ray of his left knee

## 2020-04-03 NOTE — TELEPHONE ENCOUNTER
PT WOULD LIKE DR KC TO REORDER THEIR CT SCAN [facility] AND KNEE XRAY THATS AT THE [facility] TO [facility].    PT CALL BACK [phone]

## 2020-04-08 DIAGNOSIS — G54.0 NEUROGENIC THORACIC OUTLET SYNDROME OF LEFT BRACHIAL PLEXUS: ICD-10-CM

## 2020-04-08 DIAGNOSIS — G89.29 CHRONIC PAIN OF LEFT KNEE: ICD-10-CM

## 2020-04-08 DIAGNOSIS — M25.562 CHRONIC PAIN OF LEFT KNEE: ICD-10-CM

## 2020-04-09 NOTE — PROGRESS NOTES
CT of the chest did not reveal any significant abnormalities.  Nothing to explain the arm pain.    If the pain is still present the next step would be an EMG and nerve conduction study of the arm

## 2020-04-09 NOTE — PROGRESS NOTES
Knee arthritis has progressed since the last film.  Severe medial and subpatellar arthritis.  Lateral compartment now moderate arthritis.  It looks like were moving towards total knee replacement sooner rather than later

## 2020-04-10 ENCOUNTER — TELEPHONE (OUTPATIENT)
Dept: FAMILY MEDICINE CLINIC | Facility: CLINIC | Age: 60
End: 2020-04-10

## 2020-04-10 NOTE — TELEPHONE ENCOUNTER
----- Message from Mike Redd MD sent at 4/8/2020  8:59 PM EDT -----  CT of the chest did not reveal any significant abnormalities.  Nothing to explain the arm pain.    If the pain is still present the next step would be an EMG and nerve conduction study of the arm

## 2020-04-10 NOTE — TELEPHONE ENCOUNTER
----- Message from Mike Redd MD sent at 4/8/2020  8:55 PM EDT -----  Knee arthritis has progressed since the last film.  Severe medial and subpatellar arthritis.  Lateral compartment now moderate arthritis.  It looks like were moving towards total knee replacement sooner rather than later

## 2020-06-07 ENCOUNTER — HOSPITAL ENCOUNTER (EMERGENCY)
Facility: HOSPITAL | Age: 60
Discharge: HOME | End: 2020-06-07
Attending: EMERGENCY MEDICINE | Admitting: EMERGENCY MEDICINE

## 2020-06-07 ENCOUNTER — APPOINTMENT (OUTPATIENT)
Dept: CT IMAGING | Facility: HOSPITAL | Age: 60
End: 2020-06-07

## 2020-06-07 VITALS
HEART RATE: 78 BPM | WEIGHT: 280 LBS | DIASTOLIC BLOOD PRESSURE: 77 MMHG | RESPIRATION RATE: 16 BRPM | BODY MASS INDEX: 40.09 KG/M2 | OXYGEN SATURATION: 97 % | TEMPERATURE: 97.9 F | SYSTOLIC BLOOD PRESSURE: 159 MMHG | HEIGHT: 70 IN

## 2020-06-07 DIAGNOSIS — R11.2 NAUSEA AND VOMITING, INTRACTABILITY OF VOMITING NOT SPECIFIED, UNSPECIFIED VOMITING TYPE: ICD-10-CM

## 2020-06-07 DIAGNOSIS — R42 DIZZINESS: Primary | ICD-10-CM

## 2020-06-07 LAB
ANION GAP SERPL CALCULATED.3IONS-SCNC: 11 MMOL/L (ref 5–15)
BASOPHILS # BLD MANUAL: 0.07 10*3/MM3 (ref 0–0.2)
BASOPHILS NFR BLD AUTO: 1 % (ref 0–1.5)
BUN BLD-MCNC: 13 MG/DL (ref 6–20)
BUN BLD-MCNC: ABNORMAL MG/DL
BUN/CREAT SERPL: ABNORMAL
CALCIUM SPEC-SCNC: 9.2 MG/DL (ref 8.6–10.5)
CHLORIDE SERPL-SCNC: 107 MMOL/L (ref 98–107)
CO2 SERPL-SCNC: 23 MMOL/L (ref 22–29)
CREAT BLD-MCNC: 0.79 MG/DL (ref 0.76–1.27)
DEPRECATED RDW RBC AUTO: 40.7 FL (ref 37–54)
ERYTHROCYTE [DISTWIDTH] IN BLOOD BY AUTOMATED COUNT: 13 % (ref 12.3–15.4)
GFR SERPL CREATININE-BSD FRML MDRD: 101 ML/MIN/1.73
GLUCOSE BLD-MCNC: 117 MG/DL (ref 65–99)
HCT VFR BLD AUTO: 39.8 % (ref 37.5–51)
HGB BLD-MCNC: 14 G/DL (ref 13–17.7)
HOLD SPECIMEN: NORMAL
LYMPHOCYTES # BLD MANUAL: 1.22 10*3/MM3 (ref 0.7–3.1)
LYMPHOCYTES NFR BLD MANUAL: 18 % (ref 19.6–45.3)
LYMPHOCYTES NFR BLD MANUAL: 5 % (ref 5–12)
MCH RBC QN AUTO: 31.7 PG (ref 26.6–33)
MCHC RBC AUTO-ENTMCNC: 35.3 G/DL (ref 31.5–35.7)
MCV RBC AUTO: 89.9 FL (ref 79–97)
MONOCYTES # BLD AUTO: 0.34 10*3/MM3 (ref 0.1–0.9)
NEUTROPHILS # BLD AUTO: 4.9 10*3/MM3 (ref 1.7–7)
NEUTROPHILS NFR BLD MANUAL: 70 % (ref 42.7–76)
NEUTS BAND NFR BLD MANUAL: 2 % (ref 0–5)
NEUTS VAC BLD QL SMEAR: ABNORMAL
PLAT MORPH BLD: NORMAL
PLATELET # BLD AUTO: 247 10*3/MM3 (ref 140–450)
PMV BLD AUTO: 8.7 FL (ref 6–12)
POTASSIUM BLD-SCNC: 4.1 MMOL/L (ref 3.5–5.2)
RBC # BLD AUTO: 4.42 10*6/MM3 (ref 4.14–5.8)
RBC MORPH BLD: NORMAL
SCAN SLIDE: NORMAL
SODIUM BLD-SCNC: 141 MMOL/L (ref 136–145)
TOXIC GRANULATION: ABNORMAL
TROPONIN T SERPL-MCNC: <0.01 NG/ML (ref 0–0.03)
VARIANT LYMPHS NFR BLD MANUAL: 4 % (ref 0–5)
WBC NRBC COR # BLD: 6.8 10*3/MM3 (ref 3.4–10.8)

## 2020-06-07 PROCEDURE — 93005 ELECTROCARDIOGRAM TRACING: CPT | Performed by: EMERGENCY MEDICINE

## 2020-06-07 PROCEDURE — 85025 COMPLETE CBC W/AUTO DIFF WBC: CPT | Performed by: EMERGENCY MEDICINE

## 2020-06-07 PROCEDURE — 25010000002 ONDANSETRON PER 1 MG: Performed by: EMERGENCY MEDICINE

## 2020-06-07 PROCEDURE — 80048 BASIC METABOLIC PNL TOTAL CA: CPT | Performed by: EMERGENCY MEDICINE

## 2020-06-07 PROCEDURE — 85007 BL SMEAR W/DIFF WBC COUNT: CPT | Performed by: EMERGENCY MEDICINE

## 2020-06-07 PROCEDURE — 70450 CT HEAD/BRAIN W/O DYE: CPT

## 2020-06-07 PROCEDURE — 84484 ASSAY OF TROPONIN QUANT: CPT | Performed by: EMERGENCY MEDICINE

## 2020-06-07 PROCEDURE — 99283 EMERGENCY DEPT VISIT LOW MDM: CPT

## 2020-06-07 PROCEDURE — 96374 THER/PROPH/DIAG INJ IV PUSH: CPT

## 2020-06-07 RX ORDER — ONDANSETRON 2 MG/ML
4 INJECTION INTRAMUSCULAR; INTRAVENOUS ONCE
Status: COMPLETED | OUTPATIENT
Start: 2020-06-07 | End: 2020-06-07

## 2020-06-07 RX ORDER — MECLIZINE HYDROCHLORIDE 25 MG/1
25 TABLET ORAL 3 TIMES DAILY PRN
Qty: 30 TABLET | Refills: 0 | Status: SHIPPED | OUTPATIENT
Start: 2020-06-07 | End: 2021-06-28

## 2020-06-07 RX ORDER — SODIUM CHLORIDE 0.9 % (FLUSH) 0.9 %
10 SYRINGE (ML) INJECTION AS NEEDED
Status: DISCONTINUED | OUTPATIENT
Start: 2020-06-07 | End: 2020-06-07 | Stop reason: HOSPADM

## 2020-06-07 RX ORDER — ONDANSETRON 4 MG/1
4 TABLET, ORALLY DISINTEGRATING ORAL EVERY 8 HOURS PRN
Qty: 12 TABLET | Refills: 0 | Status: SHIPPED | OUTPATIENT
Start: 2020-06-07 | End: 2021-06-28

## 2020-06-07 RX ADMIN — ONDANSETRON 4 MG: 2 INJECTION INTRAMUSCULAR; INTRAVENOUS at 12:51

## 2020-06-11 ENCOUNTER — OFFICE VISIT (OUTPATIENT)
Dept: CARDIOLOGY | Facility: CLINIC | Age: 60
End: 2020-06-11

## 2020-06-11 VITALS
OXYGEN SATURATION: 95 % | HEART RATE: 92 BPM | HEIGHT: 70 IN | DIASTOLIC BLOOD PRESSURE: 92 MMHG | BODY MASS INDEX: 42.52 KG/M2 | SYSTOLIC BLOOD PRESSURE: 156 MMHG | WEIGHT: 297 LBS

## 2020-06-11 DIAGNOSIS — G47.33 OBSTRUCTIVE SLEEP APNEA: ICD-10-CM

## 2020-06-11 DIAGNOSIS — I10 ESSENTIAL HYPERTENSION: ICD-10-CM

## 2020-06-11 DIAGNOSIS — Z01.810 PREOP CARDIOVASCULAR EXAM: Primary | ICD-10-CM

## 2020-06-11 DIAGNOSIS — I25.708 CORONARY ARTERY DISEASE OF BYPASS GRAFT OF NATIVE HEART WITH STABLE ANGINA PECTORIS (HCC): ICD-10-CM

## 2020-06-11 DIAGNOSIS — E78.00 PURE HYPERCHOLESTEROLEMIA: ICD-10-CM

## 2020-06-11 PROCEDURE — 99214 OFFICE O/P EST MOD 30 MIN: CPT | Performed by: INTERNAL MEDICINE

## 2020-06-11 RX ORDER — MELOXICAM 15 MG/1
15 TABLET ORAL DAILY
COMMUNITY
Start: 2020-05-12 | End: 2020-06-25

## 2020-06-11 NOTE — PROGRESS NOTES
"    Subjective:     Encounter Date:06/11/2020      Patient ID: Flaco Yan is a 60 y.o. male.    Chief Complaint:  History of Present Illness 60-year-old white male with history of coronary status post and placement to the RCA in the past followed by a coronary artery bypass surgery hypertension hyperlipidemia sleep apnea presents to my office for a follow-up.  Patient is currently stable with absence of chest pain or shortness of breath at rest but has some shortness of breath with exertion.  No complains any PND orthopnea.  No palpitation dizziness syncope or swelling of the feet but he is taking meds regularly.  He does not smoke.  He is trying to exercise regular but he follows a good diet.  He uses a CPAP machine.  Patient needs preop evaluation for knee surgery under general esthesia.    The following portions of the patient's history were reviewed and updated as appropriate: allergies, current medications, past family history, past medical history, past social history, past surgical history and problem list.  Past Medical History:   Diagnosis Date   • Coronary artery disease    • Hyperlipidemia    • Hypertension    • Obesity      Past Surgical History:   Procedure Laterality Date   • CORONARY ARTERY BYPASS GRAFT     • CORONARY STENT PLACEMENT     • FINGER SURGERY       /92   Pulse 92   Ht 177.8 cm (70\")   Wt 135 kg (297 lb)   SpO2 95%   BMI 42.62 kg/m²   Family History   Problem Relation Age of Onset   • Stroke Mother    • Heart disease Mother    • Heart disease Father    • Heart disease Sister        Current Outpatient Medications:   •  amLODIPine (NORVASC) 5 MG tablet, Take 1 tablet by mouth Daily., Disp: 90 tablet, Rfl: 3  •  aspirin 325 MG tablet, Take  by mouth Daily., Disp: , Rfl:   •  diazePAM (VALIUM) 10 MG tablet, Take 10mg  night before the procedure. Take 10mg on arrival to hospital. Take 10mg  Prn only  getting into MRI, Disp: 3 tablet, Rfl: 0  •  esomeprazole (NEXIUM) 20 MG capsule, " NEXIUM 22.5 MG CPDR (ESOMEPRAZOLE MAGNESIUM) OTC, Disp: , Rfl:   •  meclizine (ANTIVERT) 25 MG tablet, Take 1 tablet by mouth 3 (Three) Times a Day As Needed for Dizziness., Disp: 30 tablet, Rfl: 0  •  meloxicam (MOBIC) 15 MG tablet, , Disp: , Rfl:   •  metoprolol tartrate (LOPRESSOR) 50 MG tablet, , Disp: , Rfl:   •  Omega-3 Fatty Acids (FISH OIL) 1000 MG capsule capsule, 1 capsule Every 12 (Twelve) Hours., Disp: , Rfl:   •  ondansetron ODT (ZOFRAN-ODT) 4 MG disintegrating tablet, Place 1 tablet on the tongue Every 8 (Eight) Hours As Needed for Nausea or Vomiting., Disp: 12 tablet, Rfl: 0  •  tadalafil (CIALIS) 20 MG tablet, Take 1 tablet by mouth Daily As Needed for erectile dysfunction., Disp: 5 tablet, Rfl: 5  No Known Allergies  Social History     Socioeconomic History   • Marital status: Single     Spouse name: Not on file   • Number of children: Not on file   • Years of education: Not on file   • Highest education level: Not on file   Tobacco Use   • Smoking status: Former Smoker     Last attempt to quit: 1999     Years since quittin.4   • Smokeless tobacco: Never Used   Substance and Sexual Activity   • Alcohol use: No     Frequency: Never   • Drug use: No   • Sexual activity: Defer     Review of Systems   Constitution: Negative for fever and malaise/fatigue.   HENT: Negative for ear pain and nosebleeds.    Eyes: Negative for blurred vision and double vision.   Cardiovascular: Negative for chest pain, dyspnea on exertion, leg swelling and palpitations.   Respiratory: Positive for shortness of breath. Negative for cough.    Skin: Negative for rash.   Musculoskeletal: Negative for joint pain.   Gastrointestinal: Positive for vomiting. Negative for abdominal pain and nausea.   Neurological: Negative for focal weakness, headaches, light-headedness and numbness.   Psychiatric/Behavioral: Negative for depression. The patient is not nervous/anxious.    All other systems reviewed and are  negative.             Objective:     Physical Exam   Constitutional: He appears well-developed and well-nourished.   HENT:   Head: Normocephalic and atraumatic.   Eyes: Pupils are equal, round, and reactive to light. Conjunctivae and EOM are normal. No scleral icterus.   Neck: Normal range of motion. Neck supple. No JVD present. Carotid bruit is not present.   Cardiovascular: Normal rate, regular rhythm, S1 normal, S2 normal, normal heart sounds and intact distal pulses. PMI is not displaced.   Pulmonary/Chest: Effort normal and breath sounds normal. He has no wheezes. He has no rales.   Abdominal: Soft. Bowel sounds are normal.   Musculoskeletal: Normal range of motion.   Neurological: He is alert. He has normal strength.   No focal deficits   Skin: Skin is warm and dry. No rash noted.   Psychiatric: He has a normal mood and affect.     Procedures    Lab Review:       Assessment:          Diagnosis Plan   1. Preop cardiovascular exam     2. Coronary artery disease of bypass graft of native heart with stable angina pectoris (CMS/HCC)     3. Essential hypertension     4. Pure hypercholesterolemia     5. Obstructive sleep apnea            Plan:       Patient has history of coronary disease status post carotid bypass surgery x3 vessels with a LIMA to LAD and saphenous graft to the marginal branch and RCA  Patient has normal function  Patient has preop evaluation for knee surgery and anesthesia and will perform a Lexiscan Myoview to rule out ischemia  Patient blood pressure is slightly high and will adjust medications once this test is complete  Patient's lipids are followed by the primary care doctor  Patient has sleep apnea and uses a CPAP machine.  Continue current medicines and follow him in 6 months

## 2020-06-15 RX ORDER — METOPROLOL TARTRATE 50 MG/1
TABLET, FILM COATED ORAL
Qty: 180 TABLET | Refills: 5 | Status: SHIPPED | OUTPATIENT
Start: 2020-06-15 | End: 2021-09-13

## 2020-06-17 ENCOUNTER — LAB (OUTPATIENT)
Dept: LAB | Facility: HOSPITAL | Age: 60
End: 2020-06-17

## 2020-06-17 ENCOUNTER — HOSPITAL ENCOUNTER (OUTPATIENT)
Dept: CARDIOLOGY | Facility: HOSPITAL | Age: 60
Discharge: HOME | End: 2020-06-17

## 2020-06-17 ENCOUNTER — HOSPITAL ENCOUNTER (OUTPATIENT)
Dept: GENERAL RADIOLOGY | Facility: HOSPITAL | Age: 60
Discharge: HOME | End: 2020-06-17
Admitting: ORTHOPAEDIC SURGERY

## 2020-06-17 DIAGNOSIS — I25.708 CORONARY ARTERY DISEASE OF BYPASS GRAFT OF NATIVE HEART WITH STABLE ANGINA PECTORIS (HCC): ICD-10-CM

## 2020-06-17 DIAGNOSIS — E78.00 PURE HYPERCHOLESTEROLEMIA: ICD-10-CM

## 2020-06-17 DIAGNOSIS — G47.33 OBSTRUCTIVE SLEEP APNEA: ICD-10-CM

## 2020-06-17 DIAGNOSIS — Z01.818 PREOP TESTING: Primary | ICD-10-CM

## 2020-06-17 DIAGNOSIS — Z01.810 PREOP CARDIOVASCULAR EXAM: ICD-10-CM

## 2020-06-17 DIAGNOSIS — I10 ESSENTIAL HYPERTENSION: ICD-10-CM

## 2020-06-17 LAB
ABO GROUP BLD: NORMAL
APTT PPP: 26.8 SECONDS (ref 24–31)
BH CV STRESS COMMENTS STAGE 1: NORMAL
BH CV STRESS DOSE REGADENOSON STAGE 1: 0.4
BH CV STRESS DURATION MIN STAGE 1: 0
BH CV STRESS DURATION SEC STAGE 1: 10
BH CV STRESS PROTOCOL 1: NORMAL
BH CV STRESS RECOVERY BP: NORMAL MMHG
BH CV STRESS RECOVERY HR: 84 BPM
BH CV STRESS STAGE 1: 1
BLD GP AB SCN SERPL QL: NEGATIVE
INR PPP: 1.03 (ref 0.9–1.1)
LV EF NUC BP: 59 %
MAXIMAL PREDICTED HEART RATE: 162 BPM
MRSA DNA SPEC QL NAA+PROBE: NORMAL
PROTHROMBIN TIME: 10.8 SECONDS (ref 9.6–11.7)
RH BLD: POSITIVE
STRESS BASELINE BP: NORMAL MMHG
STRESS BASELINE HR: 76 BPM
STRESS TARGET HR: 138 BPM
T&S EXPIRATION DATE: NORMAL

## 2020-06-17 PROCEDURE — 71046 X-RAY EXAM CHEST 2 VIEWS: CPT

## 2020-06-17 PROCEDURE — A9500 TC99M SESTAMIBI: HCPCS | Performed by: INTERNAL MEDICINE

## 2020-06-17 PROCEDURE — 86901 BLOOD TYPING SEROLOGIC RH(D): CPT | Performed by: ORTHOPAEDIC SURGERY

## 2020-06-17 PROCEDURE — 0 TECHNETIUM SESTAMIBI: Performed by: INTERNAL MEDICINE

## 2020-06-17 PROCEDURE — 93016 CV STRESS TEST SUPVJ ONLY: CPT | Performed by: INTERNAL MEDICINE

## 2020-06-17 PROCEDURE — U0002 COVID-19 LAB TEST NON-CDC: HCPCS

## 2020-06-17 PROCEDURE — 93017 CV STRESS TEST TRACING ONLY: CPT

## 2020-06-17 PROCEDURE — 86850 RBC ANTIBODY SCREEN: CPT | Performed by: ORTHOPAEDIC SURGERY

## 2020-06-17 PROCEDURE — 78452 HT MUSCLE IMAGE SPECT MULT: CPT | Performed by: INTERNAL MEDICINE

## 2020-06-17 PROCEDURE — 85730 THROMBOPLASTIN TIME PARTIAL: CPT

## 2020-06-17 PROCEDURE — 86900 BLOOD TYPING SEROLOGIC ABO: CPT

## 2020-06-17 PROCEDURE — 93018 CV STRESS TEST I&R ONLY: CPT | Performed by: INTERNAL MEDICINE

## 2020-06-17 PROCEDURE — 85610 PROTHROMBIN TIME: CPT

## 2020-06-17 PROCEDURE — 87641 MR-STAPH DNA AMP PROBE: CPT

## 2020-06-17 PROCEDURE — 78452 HT MUSCLE IMAGE SPECT MULT: CPT

## 2020-06-17 PROCEDURE — U0004 COV-19 TEST NON-CDC HGH THRU: HCPCS

## 2020-06-17 PROCEDURE — 25010000002 REGADENOSON 0.4 MG/5ML SOLUTION: Performed by: INTERNAL MEDICINE

## 2020-06-17 PROCEDURE — 86900 BLOOD TYPING SEROLOGIC ABO: CPT | Performed by: ORTHOPAEDIC SURGERY

## 2020-06-17 PROCEDURE — C9803 HOPD COVID-19 SPEC COLLECT: HCPCS

## 2020-06-17 PROCEDURE — 86901 BLOOD TYPING SEROLOGIC RH(D): CPT

## 2020-06-17 RX ADMIN — TECHNETIUM TC 99M SESTAMIBI 1 DOSE: 1 INJECTION INTRAVENOUS at 13:45

## 2020-06-17 RX ADMIN — REGADENOSON 0.4 MG: 0.08 INJECTION, SOLUTION INTRAVENOUS at 13:45

## 2020-06-17 RX ADMIN — TECHNETIUM TC 99M SESTAMIBI 1 DOSE: 1 INJECTION INTRAVENOUS at 13:15

## 2020-06-18 ENCOUNTER — ANESTHESIA EVENT (OUTPATIENT)
Dept: PERIOP | Facility: HOSPITAL | Age: 60
End: 2020-06-18

## 2020-06-18 LAB
REF LAB TEST METHOD: NORMAL
SARS-COV-2 RNA RESP QL NAA+PROBE: NOT DETECTED

## 2020-06-19 ENCOUNTER — HOSPITAL ENCOUNTER (OUTPATIENT)
Facility: HOSPITAL | Age: 60
Discharge: HOME | End: 2020-06-20
Attending: ORTHOPAEDIC SURGERY | Admitting: ORTHOPAEDIC SURGERY

## 2020-06-19 ENCOUNTER — APPOINTMENT (OUTPATIENT)
Dept: GENERAL RADIOLOGY | Facility: HOSPITAL | Age: 60
End: 2020-06-19

## 2020-06-19 ENCOUNTER — ANESTHESIA (OUTPATIENT)
Dept: PERIOP | Facility: HOSPITAL | Age: 60
End: 2020-06-19

## 2020-06-19 DIAGNOSIS — Z96.659 STATUS POST TOTAL KNEE REPLACEMENT, UNSPECIFIED LATERALITY: Primary | ICD-10-CM

## 2020-06-19 DIAGNOSIS — Z96.652 S/P TOTAL KNEE ARTHROPLASTY, LEFT: ICD-10-CM

## 2020-06-19 PROCEDURE — C1776 JOINT DEVICE (IMPLANTABLE): HCPCS | Performed by: ORTHOPAEDIC SURGERY

## 2020-06-19 PROCEDURE — C1713 ANCHOR/SCREW BN/BN,TIS/BN: HCPCS | Performed by: ORTHOPAEDIC SURGERY

## 2020-06-19 PROCEDURE — 73560 X-RAY EXAM OF KNEE 1 OR 2: CPT

## 2020-06-19 PROCEDURE — 25010000002 KETOROLAC TROMETHAMINE PER 15 MG: Performed by: ANESTHESIOLOGIST ASSISTANT

## 2020-06-19 PROCEDURE — 25010000002 DEXAMETHASONE PER 1 MG: Performed by: ANESTHESIOLOGY

## 2020-06-19 PROCEDURE — 25010000002 FENTANYL CITRATE (PF) 100 MCG/2ML SOLUTION: Performed by: ANESTHESIOLOGIST ASSISTANT

## 2020-06-19 PROCEDURE — 25010000002 CEFAZOLIN PER 500 MG: Performed by: ORTHOPAEDIC SURGERY

## 2020-06-19 PROCEDURE — 25010000002 MORPHINE PER 10 MG: Performed by: ANESTHESIOLOGIST ASSISTANT

## 2020-06-19 PROCEDURE — 25010000002 PROPOFOL 10 MG/ML EMULSION: Performed by: ANESTHESIOLOGIST ASSISTANT

## 2020-06-19 PROCEDURE — G0378 HOSPITAL OBSERVATION PER HR: HCPCS

## 2020-06-19 PROCEDURE — 25010000002 ROPIVACAINE PER 1 MG: Performed by: ANESTHESIOLOGY

## 2020-06-19 PROCEDURE — 25010000002 MIDAZOLAM PER 1 MG: Performed by: ANESTHESIOLOGY

## 2020-06-19 PROCEDURE — 25010000002 ONDANSETRON PER 1 MG: Performed by: ANESTHESIOLOGIST ASSISTANT

## 2020-06-19 PROCEDURE — 25010000002 DEXAMETHASONE PER 1 MG: Performed by: ANESTHESIOLOGIST ASSISTANT

## 2020-06-19 DEVICE — CMT BONE PALACOS R HI/VISC 1X40: Type: IMPLANTABLE DEVICE | Site: KNEE | Status: FUNCTIONAL

## 2020-06-19 DEVICE — JOURNEY II CR FEMORAL OXINIUM NONPOROUS LEFT SIZE 7
Type: IMPLANTABLE DEVICE | Site: KNEE | Status: FUNCTIONAL
Brand: JOURNEY

## 2020-06-19 DEVICE — JOURNEY TIBIAL BASEPLATE NONPOROUS                                    LEFT SIZE 5
Type: IMPLANTABLE DEVICE | Site: KNEE | Status: FUNCTIONAL
Brand: JOURNEY

## 2020-06-19 DEVICE — JOURNEY BCS PATELLA RESURFACING                                    ROUND 38 MM STANDARD
Type: IMPLANTABLE DEVICE | Site: KNEE | Status: FUNCTIONAL
Brand: JOURNEY

## 2020-06-19 DEVICE — DEV CONTRL TISS STRATAFIX SPIRAL PDS PLS CT1 2-0 1/2 30CM: Type: IMPLANTABLE DEVICE | Site: KNEE | Status: FUNCTIONAL

## 2020-06-19 DEVICE — JOURNEY II CR INSERT XLPE LEFT                                    SIZE 5-6 11MM
Type: IMPLANTABLE DEVICE | Site: KNEE | Status: FUNCTIONAL
Brand: JOURNEY

## 2020-06-19 DEVICE — DEV CONTRL TISS STRATAFIX PDS PLS SZ1 VIL 18IN 45 CM: Type: IMPLANTABLE DEVICE | Site: KNEE | Status: FUNCTIONAL

## 2020-06-19 DEVICE — IMPLANTABLE DEVICE: Type: IMPLANTABLE DEVICE | Site: KNEE | Status: FUNCTIONAL

## 2020-06-19 RX ORDER — MELOXICAM 15 MG/1
15 TABLET ORAL DAILY
Status: DISCONTINUED | OUTPATIENT
Start: 2020-06-19 | End: 2020-06-20 | Stop reason: HOSPADM

## 2020-06-19 RX ORDER — LABETALOL HYDROCHLORIDE 5 MG/ML
5 INJECTION, SOLUTION INTRAVENOUS
Status: DISCONTINUED | OUTPATIENT
Start: 2020-06-19 | End: 2020-06-19 | Stop reason: HOSPADM

## 2020-06-19 RX ORDER — BUPIVACAINE HYDROCHLORIDE AND EPINEPHRINE 2.5; 5 MG/ML; UG/ML
INJECTION, SOLUTION EPIDURAL; INFILTRATION; INTRACAUDAL; PERINEURAL AS NEEDED
Status: DISCONTINUED | OUTPATIENT
Start: 2020-06-19 | End: 2020-06-19 | Stop reason: HOSPADM

## 2020-06-19 RX ORDER — FENTANYL CITRATE 50 UG/ML
INJECTION, SOLUTION INTRAMUSCULAR; INTRAVENOUS AS NEEDED
Status: DISCONTINUED | OUTPATIENT
Start: 2020-06-19 | End: 2020-06-19 | Stop reason: SURG

## 2020-06-19 RX ORDER — DEXAMETHASONE SODIUM PHOSPHATE 4 MG/ML
INJECTION, SOLUTION INTRA-ARTICULAR; INTRALESIONAL; INTRAMUSCULAR; INTRAVENOUS; SOFT TISSUE AS NEEDED
Status: DISCONTINUED | OUTPATIENT
Start: 2020-06-19 | End: 2020-06-19 | Stop reason: SURG

## 2020-06-19 RX ORDER — MIDAZOLAM HYDROCHLORIDE 1 MG/ML
1 INJECTION INTRAMUSCULAR; INTRAVENOUS
Status: DISCONTINUED | OUTPATIENT
Start: 2020-06-19 | End: 2020-06-19 | Stop reason: HOSPADM

## 2020-06-19 RX ORDER — ACETAMINOPHEN 650 MG/1
650 SUPPOSITORY RECTAL ONCE AS NEEDED
Status: DISCONTINUED | OUTPATIENT
Start: 2020-06-19 | End: 2020-06-19 | Stop reason: HOSPADM

## 2020-06-19 RX ORDER — HYDRALAZINE HYDROCHLORIDE 20 MG/ML
5 INJECTION INTRAMUSCULAR; INTRAVENOUS
Status: DISCONTINUED | OUTPATIENT
Start: 2020-06-19 | End: 2020-06-19 | Stop reason: HOSPADM

## 2020-06-19 RX ORDER — OXYCODONE HYDROCHLORIDE 5 MG/1
10 TABLET ORAL EVERY 4 HOURS PRN
Status: DISCONTINUED | OUTPATIENT
Start: 2020-06-19 | End: 2020-06-20 | Stop reason: HOSPADM

## 2020-06-19 RX ORDER — KETOROLAC TROMETHAMINE 30 MG/ML
30 INJECTION, SOLUTION INTRAMUSCULAR; INTRAVENOUS EVERY 6 HOURS PRN
Status: COMPLETED | OUTPATIENT
Start: 2020-06-19 | End: 2020-06-19

## 2020-06-19 RX ORDER — PROMETHAZINE HYDROCHLORIDE 25 MG/ML
6.25 INJECTION, SOLUTION INTRAMUSCULAR; INTRAVENOUS ONCE AS NEEDED
Status: DISCONTINUED | OUTPATIENT
Start: 2020-06-19 | End: 2020-06-19 | Stop reason: HOSPADM

## 2020-06-19 RX ORDER — ACETAMINOPHEN 650 MG/1
650 SUPPOSITORY RECTAL EVERY 8 HOURS
Status: DISCONTINUED | OUTPATIENT
Start: 2020-06-19 | End: 2020-06-20 | Stop reason: HOSPADM

## 2020-06-19 RX ORDER — ONDANSETRON 2 MG/ML
4 INJECTION INTRAMUSCULAR; INTRAVENOUS ONCE AS NEEDED
Status: DISCONTINUED | OUTPATIENT
Start: 2020-06-19 | End: 2020-06-19 | Stop reason: HOSPADM

## 2020-06-19 RX ORDER — HYDROCODONE BITARTRATE AND ACETAMINOPHEN 10; 325 MG/1; MG/1
1 TABLET ORAL EVERY 4 HOURS PRN
Status: DISCONTINUED | OUTPATIENT
Start: 2020-06-19 | End: 2020-06-19 | Stop reason: HOSPADM

## 2020-06-19 RX ORDER — PHENYLEPHRINE HCL IN 0.9% NACL 0.5 MG/5ML
.5-3 SYRINGE (ML) INTRAVENOUS
Status: DISCONTINUED | OUTPATIENT
Start: 2020-06-19 | End: 2020-06-19 | Stop reason: HOSPADM

## 2020-06-19 RX ORDER — AMLODIPINE BESYLATE 5 MG/1
5 TABLET ORAL DAILY
Status: DISCONTINUED | OUTPATIENT
Start: 2020-06-19 | End: 2020-06-20 | Stop reason: HOSPADM

## 2020-06-19 RX ORDER — TRANEXAMIC ACID 100 MG/ML
INJECTION, SOLUTION INTRAVENOUS AS NEEDED
Status: DISCONTINUED | OUTPATIENT
Start: 2020-06-19 | End: 2020-06-19 | Stop reason: SURG

## 2020-06-19 RX ORDER — PROMETHAZINE HYDROCHLORIDE 25 MG/1
25 SUPPOSITORY RECTAL ONCE AS NEEDED
Status: DISCONTINUED | OUTPATIENT
Start: 2020-06-19 | End: 2020-06-19

## 2020-06-19 RX ORDER — ACETAMINOPHEN 325 MG/1
650 TABLET ORAL ONCE AS NEEDED
Status: DISCONTINUED | OUTPATIENT
Start: 2020-06-19 | End: 2020-06-19 | Stop reason: HOSPADM

## 2020-06-19 RX ORDER — LORAZEPAM 2 MG/ML
1 INJECTION INTRAMUSCULAR
Status: DISCONTINUED | OUTPATIENT
Start: 2020-06-19 | End: 2020-06-19 | Stop reason: HOSPADM

## 2020-06-19 RX ORDER — ONDANSETRON 2 MG/ML
INJECTION INTRAMUSCULAR; INTRAVENOUS AS NEEDED
Status: DISCONTINUED | OUTPATIENT
Start: 2020-06-19 | End: 2020-06-19 | Stop reason: SURG

## 2020-06-19 RX ORDER — DIPHENHYDRAMINE HCL 25 MG
25 TABLET ORAL
Status: DISCONTINUED | OUTPATIENT
Start: 2020-06-19 | End: 2020-06-19 | Stop reason: HOSPADM

## 2020-06-19 RX ORDER — SODIUM CHLORIDE 0.9 % (FLUSH) 0.9 %
10 SYRINGE (ML) INJECTION AS NEEDED
Status: DISCONTINUED | OUTPATIENT
Start: 2020-06-19 | End: 2020-06-19 | Stop reason: HOSPADM

## 2020-06-19 RX ORDER — SODIUM CHLORIDE 9 MG/ML
100 INJECTION, SOLUTION INTRAVENOUS CONTINUOUS
Status: DISCONTINUED | OUTPATIENT
Start: 2020-06-19 | End: 2020-06-20 | Stop reason: HOSPADM

## 2020-06-19 RX ORDER — DIPHENHYDRAMINE HYDROCHLORIDE 50 MG/ML
12.5 INJECTION INTRAMUSCULAR; INTRAVENOUS
Status: DISCONTINUED | OUTPATIENT
Start: 2020-06-19 | End: 2020-06-19 | Stop reason: HOSPADM

## 2020-06-19 RX ORDER — LIDOCAINE HYDROCHLORIDE 20 MG/ML
INJECTION, SOLUTION EPIDURAL; INFILTRATION; INTRACAUDAL; PERINEURAL AS NEEDED
Status: DISCONTINUED | OUTPATIENT
Start: 2020-06-19 | End: 2020-06-19 | Stop reason: SURG

## 2020-06-19 RX ORDER — TRANEXAMIC ACID 100 MG/ML
2000 INJECTION, SOLUTION INTRAVENOUS ONCE
Status: DISCONTINUED | OUTPATIENT
Start: 2020-06-19 | End: 2020-06-19

## 2020-06-19 RX ORDER — ROPIVACAINE HYDROCHLORIDE 5 MG/ML
INJECTION, SOLUTION EPIDURAL; INFILTRATION; PERINEURAL
Status: DISCONTINUED | OUTPATIENT
Start: 2020-06-19 | End: 2020-06-19 | Stop reason: SURG

## 2020-06-19 RX ORDER — SODIUM CHLORIDE 9 MG/ML
INJECTION, SOLUTION INTRAVENOUS CONTINUOUS PRN
Status: DISCONTINUED | OUTPATIENT
Start: 2020-06-19 | End: 2020-06-19 | Stop reason: SURG

## 2020-06-19 RX ORDER — TRANEXAMIC ACID 100 MG/ML
INJECTION, SOLUTION INTRAVENOUS AS NEEDED
Status: DISCONTINUED | OUTPATIENT
Start: 2020-06-19 | End: 2020-06-19 | Stop reason: HOSPADM

## 2020-06-19 RX ORDER — CEFAZOLIN SODIUM IN 0.9 % NACL 3 G/100 ML
3 INTRAVENOUS SOLUTION, PIGGYBACK (ML) INTRAVENOUS EVERY 8 HOURS
Status: DISCONTINUED | OUTPATIENT
Start: 2020-06-19 | End: 2020-06-19

## 2020-06-19 RX ORDER — IPRATROPIUM BROMIDE AND ALBUTEROL SULFATE 2.5; .5 MG/3ML; MG/3ML
3 SOLUTION RESPIRATORY (INHALATION) ONCE AS NEEDED
Status: DISCONTINUED | OUTPATIENT
Start: 2020-06-19 | End: 2020-06-19 | Stop reason: HOSPADM

## 2020-06-19 RX ORDER — EPHEDRINE SULFATE 50 MG/ML
5 INJECTION, SOLUTION INTRAVENOUS ONCE AS NEEDED
Status: DISCONTINUED | OUTPATIENT
Start: 2020-06-19 | End: 2020-06-19 | Stop reason: HOSPADM

## 2020-06-19 RX ORDER — SODIUM CHLORIDE 0.9 % (FLUSH) 0.9 %
10 SYRINGE (ML) INJECTION EVERY 12 HOURS SCHEDULED
Status: DISCONTINUED | OUTPATIENT
Start: 2020-06-19 | End: 2020-06-19 | Stop reason: HOSPADM

## 2020-06-19 RX ORDER — FENTANYL CITRATE 50 UG/ML
50 INJECTION, SOLUTION INTRAMUSCULAR; INTRAVENOUS
Status: DISCONTINUED | OUTPATIENT
Start: 2020-06-19 | End: 2020-06-19 | Stop reason: HOSPADM

## 2020-06-19 RX ORDER — PROMETHAZINE HYDROCHLORIDE 25 MG/1
25 SUPPOSITORY RECTAL ONCE AS NEEDED
Status: DISCONTINUED | OUTPATIENT
Start: 2020-06-19 | End: 2020-06-19 | Stop reason: HOSPADM

## 2020-06-19 RX ORDER — NALOXONE HCL 0.4 MG/ML
0.1 VIAL (ML) INJECTION
Status: DISCONTINUED | OUTPATIENT
Start: 2020-06-19 | End: 2020-06-20 | Stop reason: HOSPADM

## 2020-06-19 RX ORDER — ONDANSETRON 4 MG/1
4 TABLET, FILM COATED ORAL EVERY 6 HOURS PRN
Status: DISCONTINUED | OUTPATIENT
Start: 2020-06-19 | End: 2020-06-20 | Stop reason: HOSPADM

## 2020-06-19 RX ORDER — OXYCODONE HCL 10 MG/1
10 TABLET, FILM COATED, EXTENDED RELEASE ORAL EVERY 12 HOURS SCHEDULED
Status: DISCONTINUED | OUTPATIENT
Start: 2020-06-19 | End: 2020-06-19 | Stop reason: HOSPADM

## 2020-06-19 RX ORDER — ACETAMINOPHEN 500 MG
1000 TABLET ORAL EVERY 6 HOURS
Status: DISCONTINUED | OUTPATIENT
Start: 2020-06-19 | End: 2020-06-19 | Stop reason: HOSPADM

## 2020-06-19 RX ORDER — HYDROCODONE BITARTRATE AND ACETAMINOPHEN 5; 325 MG/1; MG/1
1 TABLET ORAL ONCE AS NEEDED
Status: DISCONTINUED | OUTPATIENT
Start: 2020-06-19 | End: 2020-06-19 | Stop reason: HOSPADM

## 2020-06-19 RX ORDER — MEPERIDINE HYDROCHLORIDE 25 MG/ML
12.5 INJECTION INTRAMUSCULAR; INTRAVENOUS; SUBCUTANEOUS
Status: DISCONTINUED | OUTPATIENT
Start: 2020-06-19 | End: 2020-06-19 | Stop reason: HOSPADM

## 2020-06-19 RX ORDER — PROMETHAZINE HYDROCHLORIDE 25 MG/1
25 TABLET ORAL ONCE AS NEEDED
Status: DISCONTINUED | OUTPATIENT
Start: 2020-06-19 | End: 2020-06-19

## 2020-06-19 RX ORDER — OXYCODONE HYDROCHLORIDE 5 MG/1
5 TABLET ORAL EVERY 4 HOURS PRN
Status: DISCONTINUED | OUTPATIENT
Start: 2020-06-19 | End: 2020-06-20 | Stop reason: HOSPADM

## 2020-06-19 RX ORDER — ACETAMINOPHEN 500 MG
1000 TABLET ORAL EVERY 8 HOURS
Status: DISCONTINUED | OUTPATIENT
Start: 2020-06-19 | End: 2020-06-20 | Stop reason: HOSPADM

## 2020-06-19 RX ORDER — ONDANSETRON 2 MG/ML
4 INJECTION INTRAMUSCULAR; INTRAVENOUS EVERY 6 HOURS PRN
Status: DISCONTINUED | OUTPATIENT
Start: 2020-06-19 | End: 2020-06-20 | Stop reason: HOSPADM

## 2020-06-19 RX ORDER — PROMETHAZINE HYDROCHLORIDE 25 MG/ML
12.5 INJECTION, SOLUTION INTRAMUSCULAR; INTRAVENOUS ONCE AS NEEDED
Status: DISCONTINUED | OUTPATIENT
Start: 2020-06-19 | End: 2020-06-19 | Stop reason: HOSPADM

## 2020-06-19 RX ORDER — OXYCODONE HYDROCHLORIDE 5 MG/1
5 TABLET ORAL ONCE AS NEEDED
Status: DISCONTINUED | OUTPATIENT
Start: 2020-06-19 | End: 2020-06-19 | Stop reason: HOSPADM

## 2020-06-19 RX ORDER — PROMETHAZINE HYDROCHLORIDE 25 MG/1
25 TABLET ORAL ONCE AS NEEDED
Status: DISCONTINUED | OUTPATIENT
Start: 2020-06-19 | End: 2020-06-19 | Stop reason: HOSPADM

## 2020-06-19 RX ORDER — METOPROLOL TARTRATE 50 MG/1
50 TABLET, FILM COATED ORAL 2 TIMES DAILY
Status: DISCONTINUED | OUTPATIENT
Start: 2020-06-19 | End: 2020-06-20 | Stop reason: HOSPADM

## 2020-06-19 RX ORDER — CEFAZOLIN SODIUM IN 0.9 % NACL 3 G/100 ML
3 INTRAVENOUS SOLUTION, PIGGYBACK (ML) INTRAVENOUS EVERY 8 HOURS
Status: DISCONTINUED | OUTPATIENT
Start: 2020-06-19 | End: 2020-06-20 | Stop reason: HOSPADM

## 2020-06-19 RX ORDER — NALOXONE HCL 0.4 MG/ML
0.4 VIAL (ML) INJECTION AS NEEDED
Status: DISCONTINUED | OUTPATIENT
Start: 2020-06-19 | End: 2020-06-19 | Stop reason: HOSPADM

## 2020-06-19 RX ORDER — GABAPENTIN 300 MG/1
600 CAPSULE ORAL 3 TIMES DAILY
Status: DISCONTINUED | OUTPATIENT
Start: 2020-06-19 | End: 2020-06-19 | Stop reason: HOSPADM

## 2020-06-19 RX ORDER — ASPIRIN 325 MG
325 TABLET ORAL NIGHTLY
Status: DISCONTINUED | OUTPATIENT
Start: 2020-06-20 | End: 2020-06-20 | Stop reason: HOSPADM

## 2020-06-19 RX ORDER — CELECOXIB 200 MG/1
200 CAPSULE ORAL DAILY
Status: COMPLETED | OUTPATIENT
Start: 2020-06-19 | End: 2020-06-19

## 2020-06-19 RX ORDER — MIDAZOLAM HYDROCHLORIDE 1 MG/ML
INJECTION INTRAMUSCULAR; INTRAVENOUS
Status: DISCONTINUED | OUTPATIENT
Start: 2020-06-19 | End: 2020-06-19 | Stop reason: SURG

## 2020-06-19 RX ORDER — FLUMAZENIL 0.1 MG/ML
0.2 INJECTION INTRAVENOUS AS NEEDED
Status: DISCONTINUED | OUTPATIENT
Start: 2020-06-19 | End: 2020-06-19 | Stop reason: HOSPADM

## 2020-06-19 RX ORDER — PROPOFOL 10 MG/ML
VIAL (ML) INTRAVENOUS AS NEEDED
Status: DISCONTINUED | OUTPATIENT
Start: 2020-06-19 | End: 2020-06-19 | Stop reason: SURG

## 2020-06-19 RX ORDER — SODIUM CHLORIDE, SODIUM LACTATE, POTASSIUM CHLORIDE, CALCIUM CHLORIDE 600; 310; 30; 20 MG/100ML; MG/100ML; MG/100ML; MG/100ML
9 INJECTION, SOLUTION INTRAVENOUS CONTINUOUS PRN
Status: DISCONTINUED | OUTPATIENT
Start: 2020-06-19 | End: 2020-06-19 | Stop reason: HOSPADM

## 2020-06-19 RX ORDER — HYDROMORPHONE HCL 110MG/55ML
1 PATIENT CONTROLLED ANALGESIA SYRINGE INTRAVENOUS EVERY 4 HOURS PRN
Status: DISCONTINUED | OUTPATIENT
Start: 2020-06-19 | End: 2020-06-20 | Stop reason: HOSPADM

## 2020-06-19 RX ORDER — MORPHINE SULFATE 4 MG/ML
INJECTION, SOLUTION INTRAMUSCULAR; INTRAVENOUS AS NEEDED
Status: DISCONTINUED | OUTPATIENT
Start: 2020-06-19 | End: 2020-06-19 | Stop reason: SURG

## 2020-06-19 RX ORDER — DEXAMETHASONE SODIUM PHOSPHATE 4 MG/ML
INJECTION, SOLUTION INTRA-ARTICULAR; INTRALESIONAL; INTRAMUSCULAR; INTRAVENOUS; SOFT TISSUE
Status: DISCONTINUED | OUTPATIENT
Start: 2020-06-19 | End: 2020-06-19 | Stop reason: SURG

## 2020-06-19 RX ADMIN — FENTANYL CITRATE 50 MCG: 50 INJECTION INTRAMUSCULAR; INTRAVENOUS at 15:45

## 2020-06-19 RX ADMIN — SODIUM CHLORIDE 100 ML/HR: 900 INJECTION, SOLUTION INTRAVENOUS at 17:59

## 2020-06-19 RX ADMIN — PROPOFOL 200 MG: 10 INJECTION, EMULSION INTRAVENOUS at 13:42

## 2020-06-19 RX ADMIN — SODIUM CHLORIDE, SODIUM LACTATE, POTASSIUM CHLORIDE, AND CALCIUM CHLORIDE 9 ML/HR: 600; 310; 30; 20 INJECTION, SOLUTION INTRAVENOUS at 13:21

## 2020-06-19 RX ADMIN — OXYCODONE HYDROCHLORIDE 10 MG: 5 TABLET ORAL at 22:01

## 2020-06-19 RX ADMIN — KETOROLAC TROMETHAMINE 30 MG: 30 INJECTION, SOLUTION INTRAMUSCULAR at 16:01

## 2020-06-19 RX ADMIN — LIDOCAINE HYDROCHLORIDE 50 MG: 20 INJECTION, SOLUTION EPIDURAL; INFILTRATION; INTRACAUDAL; PERINEURAL at 13:42

## 2020-06-19 RX ADMIN — FENTANYL CITRATE 50 MCG: 50 INJECTION, SOLUTION INTRAMUSCULAR; INTRAVENOUS at 14:10

## 2020-06-19 RX ADMIN — CELECOXIB 200 MG: 200 CAPSULE ORAL at 13:20

## 2020-06-19 RX ADMIN — SODIUM CHLORIDE: 0.9 INJECTION, SOLUTION INTRAVENOUS at 13:37

## 2020-06-19 RX ADMIN — FENTANYL CITRATE 50 MCG: 50 INJECTION, SOLUTION INTRAMUSCULAR; INTRAVENOUS at 13:42

## 2020-06-19 RX ADMIN — SODIUM CHLORIDE: 0.9 INJECTION, SOLUTION INTRAVENOUS at 15:03

## 2020-06-19 RX ADMIN — DEXAMETHASONE SODIUM PHOSPHATE 4 MG: 4 INJECTION, SOLUTION INTRAMUSCULAR; INTRAVENOUS at 13:32

## 2020-06-19 RX ADMIN — OXYCODONE HYDROCHLORIDE 10 MG: 10 TABLET, FILM COATED, EXTENDED RELEASE ORAL at 13:21

## 2020-06-19 RX ADMIN — MELOXICAM 15 MG: 15 TABLET ORAL at 19:08

## 2020-06-19 RX ADMIN — ONDANSETRON 4 MG: 2 INJECTION INTRAMUSCULAR; INTRAVENOUS at 14:56

## 2020-06-19 RX ADMIN — ROPIVACAINE HYDROCHLORIDE 30 ML: 5 INJECTION, SOLUTION EPIDURAL; INFILTRATION; PERINEURAL at 13:32

## 2020-06-19 RX ADMIN — OXYCODONE HYDROCHLORIDE 10 MG: 5 TABLET ORAL at 17:59

## 2020-06-19 RX ADMIN — MORPHINE SULFATE 4 MG: 4 INJECTION INTRAVENOUS at 15:00

## 2020-06-19 RX ADMIN — PROPOFOL 150 MCG/KG/MIN: 10 INJECTION, EMULSION INTRAVENOUS at 13:45

## 2020-06-19 RX ADMIN — MIDAZOLAM 2 MG: 1 INJECTION INTRAMUSCULAR; INTRAVENOUS at 13:32

## 2020-06-19 RX ADMIN — SODIUM CHLORIDE 3 G: 9 INJECTION, SOLUTION INTRAVENOUS at 22:40

## 2020-06-19 RX ADMIN — FENTANYL CITRATE 50 MCG: 50 INJECTION INTRAMUSCULAR; INTRAVENOUS at 16:04

## 2020-06-19 RX ADMIN — METOPROLOL TARTRATE 50 MG: 50 TABLET, FILM COATED ORAL at 20:13

## 2020-06-19 RX ADMIN — ACETAMINOPHEN 1000 MG: 500 TABLET, FILM COATED ORAL at 13:21

## 2020-06-19 RX ADMIN — DEXAMETHASONE SODIUM PHOSPHATE 4 MG: 4 INJECTION, SOLUTION INTRAMUSCULAR; INTRAVENOUS at 13:53

## 2020-06-19 RX ADMIN — AMLODIPINE BESYLATE 5 MG: 5 TABLET ORAL at 17:59

## 2020-06-19 RX ADMIN — TRANEXAMIC ACID 1000 MG: 100 INJECTION, SOLUTION INTRAVENOUS at 13:50

## 2020-06-19 RX ADMIN — GABAPENTIN 600 MG: 300 CAPSULE ORAL at 13:21

## 2020-06-19 RX ADMIN — ACETAMINOPHEN 1000 MG: 500 TABLET, FILM COATED ORAL at 19:08

## 2020-06-19 NOTE — ANESTHESIA PROCEDURE NOTES
Airway  Urgency: elective    Date/Time: 6/19/2020 1:47 PM  Airway not difficult    General Information and Staff    Patient location during procedure: OR    Indications and Patient Condition  Indications for airway management: airway protection    Preoxygenated: yes  MILS maintained throughout  Mask difficulty assessment: 0 - not attempted    Final Airway Details  Final airway type: supraglottic airway      Successful airway: LMA  Size 5    Number of attempts at approach: 1  Assessment: lips, teeth, and gum same as pre-op and atraumatic intubation

## 2020-06-19 NOTE — ANESTHESIA PREPROCEDURE EVALUATION
Anesthesia Evaluation     Patient summary reviewed and Nursing notes reviewed   NPO Solid Status: > 2 hours  NPO Liquid Status: > 2 hours           Airway   Mallampati: I  TM distance: >3 FB  Neck ROM: full  No difficulty expected  Dental - normal exam     Pulmonary - normal exam   (+) sleep apnea on CPAP,   Cardiovascular - normal exam    (+) hypertension, past MI  >12 months, CAD, CABG >6 Months, hyperlipidemia,       Neuro/Psych  (+) numbness,     GI/Hepatic/Renal/Endo    (+) obesity, morbid obesity, GERD,      Musculoskeletal     Abdominal  - normal exam    Bowel sounds: normal.   Substance History - negative use     OB/GYN negative ob/gyn ROS         Other   arthritis,                      Anesthesia Plan    ASA 3     general with block   total IV anesthesia  intravenous induction     Anesthetic plan, all risks, benefits, and alternatives have been provided, discussed and informed consent has been obtained with: patient.    Plan discussed with CAA.

## 2020-06-19 NOTE — H&P
Orthopaedic Surgery  History & Physical For Elective Total Knee  Dr. SALLY Peoples II  [phone]    HPI:  Patient is a 60 y.o. Not  or  male who presents with End-stage arthritis of the left knee. They failed conservative treatment of their knee pain and a thorough discussion of the risks and benefits of surgery was had. The patient wishes to continue with elective total knee replacement, they were scheduled and are here for surgery. They did get medical clearance as well as a thorough preoperative workup.    MEDICAL HISTORY  Past Medical History:   Diagnosis Date   • Coronary artery disease    • GERD (gastroesophageal reflux disease)    • Hyperlipidemia    • Hypertension    • Obesity    • Sleep apnea     no machine   ·   Past Surgical History:   Procedure Laterality Date   • CARDIAC CATHETERIZATION     • CARDIAC SURGERY     • CORONARY ARTERY BYPASS GRAFT     • CORONARY STENT PLACEMENT     • FINGER SURGERY     ·   Prior to Admission medications    Medication Sig Start Date End Date Taking? Authorizing Provider   amLODIPine (NORVASC) 5 MG tablet Take 1 tablet by mouth Daily.  Patient taking differently: Take 5 mg by mouth every night at bedtime. 10/21/19  Yes Hubert Fuller MD   aspirin 325 MG tablet Take 325 mg by mouth Every Night. 6/12 @1400 Call out to Cecy at Dr Peoples office as to stopping ASA. Message left for her to call with order. 2/5/14  Yes ProviderWilfred MD   esomeprazole (NEXIUM) 20 MG capsule Take 20 mg by mouth Every Morning Before Breakfast. 9/21/15  Yes ProviderWilfred MD   meloxicam (MOBIC) 15 MG tablet Take 15 mg by mouth Daily. LD 6/12 5/12/20  Yes ProviderWilfred MD   diazePAM (VALIUM) 10 MG tablet Take 10mg  night before the procedure. Take 10mg on arrival to hospital. Take 10mg  Prn only  getting into MRI  Patient taking differently: 10 mg. Take 10mg  night before the procedure. Take 10mg on arrival to hospital. Take 10mg  Prn only  getting into MRI  "3/18/20   Mike Redd MD   meclizine (ANTIVERT) 25 MG tablet Take 1 tablet by mouth 3 (Three) Times a Day As Needed for Dizziness. 20   William Spivey MD   metoprolol tartrate (LOPRESSOR) 50 MG tablet TAKE ONE TABLET BY MOUTH TWICE A DAY 6/15/20   Mike Redd MD   Omega-3 Fatty Acids (FISH OIL) 1000 MG capsule capsule 1 capsule Every 12 (Twelve) Hours. 3/22/18   Wilfred Alcocer MD   ondansetron ODT (ZOFRAN-ODT) 4 MG disintegrating tablet Place 1 tablet on the tongue Every 8 (Eight) Hours As Needed for Nausea or Vomiting. 20   William Spivey MD   tadalafil (CIALIS) 20 MG tablet Take 1 tablet by mouth Daily As Needed for erectile dysfunction. 19   Mike Redd MD   ·   · No Known Allergies  ·   · There is no immunization history on file for this patient.  Social History     Tobacco Use   • Smoking status: Former Smoker     Last attempt to quit: 1999     Years since quittin.5   • Smokeless tobacco: Never Used   Substance Use Topics   • Alcohol use: No     Frequency: Never   ·    Social History     Substance and Sexual Activity   Drug Use No   ·     REVIEW OF SYSTEMS:  · Head: negative for headache  · Respiratory: negative for shortness of breath.   · Cardiovascular: negative for chest pain.   · Gastrointestinal: negative abdominal pain.   · Neurological: negative for LOC  · Psychiatric/Behavioral: negative for memory loss.   · All other systems reviewed and are negative    VITALS: Ht 177.8 cm (70\")   Wt 135 kg (297 lb)   BMI 42.62 kg/m²  Body mass index is 42.62 kg/m².    PHYSICAL EXAM:   · CONSTITUTIONAL: A&Ox3, No acute distress  · LUNGS: Equal chest rise, no shortness of air  · CARDIOVASCULAR: palpable peripheral pulses  · SKIN: no skin lesions in the area examined  · LYMPH: no lymphadenopathy in the area examined  · EXTREMITY: Knee  · Pulses:  Brisk Capillary Refill  · Sensation: Intact to Saphenous, Sural, Deep Peroneal, Superficial Peroneal, and Tibial Nerves " and grossly throughout extremity  · Motor: 5/5 EHL/FHL/TA/GS motor complexes    RADIOLOGY REVIEW:   Xr Chest Pa & Lateral    Result Date: 6/17/2020  Mildly enlarged cardiac silhouette with prominent pulmonary vessels, suggestive of pulmonary vascular congestion/edema.   Electronically Signed By-Ju House On:6/17/2020 2:38 PM This report was finalized on [id number] by  Ju House, .      LABS:   Results for the past 24 hours: No results found for this or any previous visit (from the past 24 hour(s)).    IMPRESSION:  Patient is a 60 y.o. Not  or  male with end-stage arthritis of the left knee    PLAN:   · Surgery: Elective total knee arthroplasty  · Consent: The risks and benefits of operative versus nonoperative treatment were discussed. The patient elected to undergo operative treatment of their knee arthritis. The risks discussed included but were not limited to blood clots, MI, stroke, other medical complications, infection, damage to neurovascular structures, continued pain, hardware prominence, loss of range of motion, need for further procedures, and and risk of anesthesia..  No guarantees were made   · Disposition: Elective left Total Knee Arthroplasty today.    Fernie Peoples II, MD  Orthopaedic Surgery  [facility]

## 2020-06-19 NOTE — OP NOTE
Total Knee Replacement Operative Note  Dr. SALLY Peoples II  [phone]    PATIENT NAME: Flaco Yan  MRN: [id number]  : 1960 AGE: 60 y.o. GENDER: male  DATE OF OPERATION: 2020  PREOPERATIVE DIAGNOSIS: End Stage Arthritis  POSTOPERATIVE DIAGNOSIS: Same  OPERATION PERFORMED: Left Total Knee Arthroplasty  SURGEON: Fernie Peoples MD  Circulator: Elizabeth Rosa RN  Scrub Person: Kaylene Campbell  Assistant: Lisa Rolle  ANESTHESIA: General with Block  ASSISTANT: Lisa Hopson. This case would not have been possible without another set of skilled surgical hands for retraction, use of instrumentation, and general assistance.  This assistance was vital to the success of the case.   ESTIMATED BLOOD LOSS: 50cc  SPONGE AND NEEDLE COUNT: Correct  INDICATIONS:   A discussion of operative versus nonoperative treatment was had with the patient and they failed conservative management. They elected to undergo total knee arthroplasty. The risks of surgery were discussed and included the risk of anesthesia, infection, damage to neurovascular structures, implant loosening/failure, fracture, hardware prominence, continued pain, early failure, the need for further procedures, medical complications, and others. No guarantees were made. The patient wished to proceed with surgery and a surgical consent was signed.    COMPONENTS:   · Journey II CR Oxinium Femoral Component: Size 7  · Journey II Tibial Baseplate: 5   · CR Articular Insert: 11  · Patella: 38mm    PERTINENT FINDINGS: Degenerative Arthritis    DETAILS OF PROCEDURE:  The patient was met in the preoperative area. The site was marked. The consent and H&P were reviewed. The patient was then wheeled back to the operative suite and transferred to the operative table. The patient underwent anesthesia. A tourniquet was placed on the upper thigh. The Elliott baseplate was secured to the table. Surgical alcohol was used to thoroughly clean the entire operative  extremity. A bump was placed under the operative hip.     The leg was then prepped in the normal sterile fashion, which included ChloraPrep, multiple layers of sterile drapes, and surgical space suits for the entire operative team. The Elliott boot was applied to the foot after adequate padding. An Ioban dressing was applied to the knee after the surgical incision was marked. New outer gloves were used by all sterile surgical team members after final draping. After a surgical timeout in which administration of preoperative antibiotics as well as 1g of tranexamic acid (if not contraindicated) and the surgical site were confirmed, the tourniquet was inflated.     In flexion, a midline knee incision was utilized centered on the patella and ending medial to the tibial tubercle. Dissection was carried down to the knee capsule. A midvastus ararthrotomy was completed. The patellar fat pad was excised. The MCL was minimally elevated to gain adequate exposure to the knee.      The patella was subluxed laterally and then the height was measured. The patella was held vertical using 2 clamps, and was then cut using a saw. The patella was then sized, and the lug holes were drilled. Excess patellar bone was removed using a saw. The patella was then protected during this case using the metal patella shield.    The femoral canal was breached using the reamer. The canal was thoroughly irrigated. The intramedullary femoral jig was inserted. The distal cutting block was pinned in place and held with a kocher clamp. The cut was made with an oscillating saw. With all saw cuts, the soft tissues were protected with retractors. The sizing jig was placed onto the femur and set to 3 degrees of external rotation. Rotation was checked against the epicondylar axis and Whitesides line. The femur was then sized. The size matching block was placed and secured with pins. The cuts were then made with oscillating saw in a routine fashion. All bone  cuts were removed.     The tibial canal was then breached using the entry drill. The canal was thoroughly irrigated. Next the intramedullary guide was inserted down the tibial canal. The cutting jig was aligned with the medial third of the tibial tubercle. The height of the cut was measured and the cutting jig was then secured with pins. The slope and varus/valgus positioning was checked with an extramedullary lino which was attached to the cutting jig.     The cut was then made using the oscillating saw, again ensuring that the retractors were in proper position to protect the collateral ligaments and the patellar tendon, as well as the neurovascular bundle and PCL posteriorly.     A lamina  was inserted into the notch with the knee in flexion and used to expose the posterior joint. Using a kocher and bovie the remaining medial and lateral menisci were removed. Excess posterior osteophytes were also removed with a osteotome, mallet, and rongeur as necessary.      Next a trial of the knee was performed using trial femoral and tibial trial components. Polyethylene trials were inserted as needed to gain appropriate stability. A drop lino was once again used to assess the varus/valgus alignment of the knee and the knee was noted to be in excellent alignment. Soft tissue releases were then performed as necessary to fine-tune the balancing of the knee.  After taking the knee through one final range of motion, the tibial rotation of the trial was noted. The femoral lug holes were then drilled and the anterior femoral cut was finalized with a saw.    We next turned our attention back to the tibia to finish the tibial preparation. The tibia was measured and sized. The tibial plate was aligned with the rotation from the trialing process and verified to be positioned near the medial third of the tibial tubercle. The tibial surface was then prepared for the keel .    The knee was thoroughly irrigated with sterile saline  "using a pulse-lavage system while the final tibial baseplate, femoral component and patellar component were opened. Cement was prepared and mixed using standard techniques. Outer gloves were changed before implant handling to ensure no soft tissue or oily material was exposed to the surfaces of the final implants. The bony knee surfaces were dried and the implants were cemented in place, starting with the tibia, then the femur and finally the patella. Excess cement was removed at each step. A trial poly was utilized during cementation for compression. The tourniquet was taken down and adequate hemostasis was achieved. The knee was thoroughly irrigated once again.     The soft tissues about the knee were then injected with an anesthetic cocktail. Care was taken to avoid the peroneal nerve and the neurovascular bundle posteriorly. The cement was allowed to harden. After the cement was fully set, the knee was ranged with various thickness of polyethylene trials to achieve full extension and adequate flexion. The knee was inspected for excess cement, which was removed. The real poly, of corresponding thickness was then opened and inserted into the knee. One final range of motion and stability test showed the knee to be in good condition with a well tracking patellar component.    The knee capsule was then closed with a running barbed suture and supplemented with interrupted #1 Vicryl. 2g of tranexamic acid was then injected into the knee joint, and the knee capsule was noted to be water tight. The subcutaneous layer was closed with 2-0 Vicryl and the skin was closed with a running barbed Monocryl. A GILBERTO Wound VAC was then applied    The patient was awoken from anesthesia, moved to the [facility] and taken to the recovery room in stable condition. Sponge and needle count were correct. There were no complications. Patient tolerated the procedure well.    R \"William\" Richelle MOSHER MD  Orthopaedic Surgery  [facility] " [facility]  [phone]

## 2020-06-19 NOTE — ANESTHESIA PROCEDURE NOTES
Peripheral Block      Start time: 6/19/2020 1:20 PM  Stop time: 6/19/2020 1:34 PM  Reason for block: at surgeon's request and post-op pain management  Performed by  Anesthesiologist: Justin Jett DO  Preanesthetic Checklist  Completed: patient identified, site marked, surgical consent, pre-op evaluation, timeout performed, IV checked, risks and benefits discussed and monitors and equipment checked  Prep:  Pt Position: supine  Sterile barriers:cap, gloves, mask and sterile barriers  Prep: ChloraPrep  Patient monitoring: blood pressure monitoring, continuous pulse oximetry and EKG  Procedure  Sedation:yes    Guidance:ultrasound guided  ULTRASOUND INTERPRETATION. Using ultrasound guidance a 20 G gauge needle was placed in close proximity to the nerve, at which point, under ultrasound guidance anesthetic was injected in the area of the nerve and spread of the anesthesia was seen on ultrasound in close proximity thereto.  There were no abnormalities seen on ultrasound; a digital image was taken; and the patient tolerated the procedure with no complications. Images:still images not obtained    Laterality:left  Block Type:adductor canal block  Injection Technique:single-shot  Needle Type:echogenic  Needle Gauge:20 G  Resistance on Injection: none  Sedation medications used: midazolam (VERSED) injection, 2 mg  Medications Used: dexamethasone (DECADRON) injection, 4 mg  ropivacaine (NAROPIN) 0.5 % injection, 30 mL  Med admintered at 6/19/2020 1:32 PM

## 2020-06-20 ENCOUNTER — READMISSION MANAGEMENT (OUTPATIENT)
Dept: CALL CENTER | Facility: HOSPITAL | Age: 60
End: 2020-06-20

## 2020-06-20 VITALS
WEIGHT: 286.6 LBS | SYSTOLIC BLOOD PRESSURE: 144 MMHG | DIASTOLIC BLOOD PRESSURE: 85 MMHG | HEART RATE: 81 BPM | OXYGEN SATURATION: 100 % | BODY MASS INDEX: 41.03 KG/M2 | RESPIRATION RATE: 12 BRPM | TEMPERATURE: 98.1 F | HEIGHT: 70 IN

## 2020-06-20 LAB
ANION GAP SERPL CALCULATED.3IONS-SCNC: 10 MMOL/L (ref 5–15)
BUN BLD-MCNC: 13 MG/DL (ref 6–20)
BUN BLD-MCNC: ABNORMAL MG/DL
BUN/CREAT SERPL: ABNORMAL
CALCIUM SPEC-SCNC: 8.4 MG/DL (ref 8.6–10.5)
CHLORIDE SERPL-SCNC: 106 MMOL/L (ref 98–107)
CO2 SERPL-SCNC: 22 MMOL/L (ref 22–29)
CREAT BLD-MCNC: 0.95 MG/DL (ref 0.76–1.27)
DEPRECATED RDW RBC AUTO: 42 FL (ref 37–54)
ERYTHROCYTE [DISTWIDTH] IN BLOOD BY AUTOMATED COUNT: 13.1 % (ref 12.3–15.4)
GFR SERPL CREATININE-BSD FRML MDRD: 81 ML/MIN/1.73
GLUCOSE BLD-MCNC: 138 MG/DL (ref 65–99)
HCT VFR BLD AUTO: 38.3 % (ref 37.5–51)
HGB BLD-MCNC: 12.6 G/DL (ref 13–17.7)
MCH RBC QN AUTO: 30.3 PG (ref 26.6–33)
MCHC RBC AUTO-ENTMCNC: 32.9 G/DL (ref 31.5–35.7)
MCV RBC AUTO: 92.3 FL (ref 79–97)
PLATELET # BLD AUTO: 260 10*3/MM3 (ref 140–450)
PMV BLD AUTO: 8.3 FL (ref 6–12)
POTASSIUM BLD-SCNC: 4.5 MMOL/L (ref 3.5–5.2)
RBC # BLD AUTO: 4.16 10*6/MM3 (ref 4.14–5.8)
SODIUM BLD-SCNC: 138 MMOL/L (ref 136–145)
WBC NRBC COR # BLD: 12.1 10*3/MM3 (ref 3.4–10.8)

## 2020-06-20 PROCEDURE — 80048 BASIC METABOLIC PNL TOTAL CA: CPT | Performed by: ORTHOPAEDIC SURGERY

## 2020-06-20 PROCEDURE — 85027 COMPLETE CBC AUTOMATED: CPT | Performed by: ORTHOPAEDIC SURGERY

## 2020-06-20 PROCEDURE — 97162 PT EVAL MOD COMPLEX 30 MIN: CPT

## 2020-06-20 PROCEDURE — G0378 HOSPITAL OBSERVATION PER HR: HCPCS

## 2020-06-20 RX ORDER — OXYCODONE HYDROCHLORIDE AND ACETAMINOPHEN 5; 325 MG/1; MG/1
1 TABLET ORAL EVERY 4 HOURS PRN
Qty: 40 TABLET | Refills: 0 | Status: SHIPPED | OUTPATIENT
Start: 2020-06-20 | End: 2021-06-28

## 2020-06-20 RX ADMIN — MELOXICAM 15 MG: 15 TABLET ORAL at 08:31

## 2020-06-20 RX ADMIN — METOPROLOL TARTRATE 50 MG: 50 TABLET, FILM COATED ORAL at 08:31

## 2020-06-20 RX ADMIN — AMLODIPINE BESYLATE 5 MG: 5 TABLET ORAL at 08:31

## 2020-06-20 RX ADMIN — ASPIRIN 325 MG ORAL TABLET 325 MG: 325 PILL ORAL at 08:30

## 2020-06-20 RX ADMIN — ACETAMINOPHEN 1000 MG: 500 TABLET, FILM COATED ORAL at 03:31

## 2020-06-20 NOTE — PLAN OF CARE
Pt is a 61 yo male S/P TKA left. Pt lives with spouse. Pt reports he will have 2 person assist to enter home and with stairs. Pt reports he has more knee ROM now after surgery than he had before surgery. Prior pt was independent with mobility including driving in home and community. This evaluation Pt was alert and oriented x 4, 0/10 pain at rest, 3/10 pain with knee replacement exercise protocol and walking. Pt needed demonstration and VC for improving quality and symmetry of gait 80 feet, FWW, supervision for safety. ROM left knee 90 degrees flexion to lacking 10 degrees of extension. Pt trained and repeated back exercises. Pt is appropriate for D/C home with family and HH PT to follow.

## 2020-06-20 NOTE — OUTREACH NOTE
Prep Survey      Responses   [facility] facility patient discharged from?  [facility]   Is LACE score < 7 ?  Yes   Eligibility  [facility]   Hospital  LEFT TOTAL KNEE   Date of Admission  06/19/20   Date of Discharge  06/20/20   Discharge Disposition  Home or Self Care   Discharge diagnosis  LEFT TOTAL KNEE   COVID-19 Test Status  Negative   Does the patient have one of the following disease processes/diagnoses(primary or secondary)?  Total Joint Replacement   Does the patient have Home health ordered?  Yes   What is the Home health agency?   [facility].   Is there a DME ordered?  No   Prep survey completed?  Yes          Miracle Sauer RN

## 2020-06-20 NOTE — DISCHARGE PLACEMENT REQUEST
"Flaco Zaldivar (60 y.o. Male)     Date of Birth Social Security Number Address Home Phone MRN    1960  [id number]  [place] [address] [phone] [id number]    Orthodox Marital Status          Latter day Single       Admission Date Admission Type Admitting Provider Attending Provider Department, Room/Bed    6/19/20 Elective Fernie Peoples II, MD Sweet, Richard Alexander II, MD [facility] SURGICAL INPATIENT, 4114/1    Discharge Date Discharge Disposition Discharge Destination         Home or Self Care              Attending Provider:  Fernie Peoples II, MD    Allergies:  No Known Allergies    Isolation:  None   Infection:  None   Code Status:  CPR    Ht:  177.8 cm (70\")   Wt:  130 kg (286 lb 9.6 oz)    Admission Cmt:  None   Principal Problem:  None                Active Insurance as of 6/19/2020     Primary Coverage     Payor Plan Insurance Group Employer/Plan Group    HUMANA HUMANA [id number]     Payor Plan Address Payor Plan Phone Number Payor Plan Fax Number Effective Dates    [address] [address] [phone]  1/1/2019 - None Entered    [facility] [address]       Subscriber Name Subscriber Birth Date Member ID       FLACO ZALDIVAR 1960 [id number]                 Emergency Contacts      (Rel.) Home Phone Work Phone Mobile Phone    Ling Solis (Sister) -- -- [phone]    RAJENDRA CHAWLA (Significant Other) [phone] -- --               History & Physical      Fernie Peoples II, MD at 06/19/20 0904            Orthopaedic Surgery  History & Physical For Elective Total Knee  Dr. SALLY Peoples II  [phone]    HPI:  Patient is a 60 y.o. Not  or  male who presents with End-stage arthritis of the left knee. They failed conservative treatment of their knee pain and a thorough discussion of the risks and benefits of surgery was had. The patient wishes to continue with elective total knee replacement, they were scheduled and " are here for surgery. They did get medical clearance as well as a thorough preoperative workup.    MEDICAL HISTORY  Past Medical History:   Diagnosis Date   • Coronary artery disease    • GERD (gastroesophageal reflux disease)    • Hyperlipidemia    • Hypertension    • Obesity    • Sleep apnea     no machine   ·   Past Surgical History:   Procedure Laterality Date   • CARDIAC CATHETERIZATION     • CARDIAC SURGERY     • CORONARY ARTERY BYPASS GRAFT     • CORONARY STENT PLACEMENT     • FINGER SURGERY     ·   Prior to Admission medications    Medication Sig Start Date End Date Taking? Authorizing Provider   amLODIPine (NORVASC) 5 MG tablet Take 1 tablet by mouth Daily.  Patient taking differently: Take 5 mg by mouth every night at bedtime. 10/21/19  Yes Hubert Fuller MD   aspirin 325 MG tablet Take 325 mg by mouth Every Night. 6/12 @1400 Call out to Cecy at Dr Peoples office as to stopping ASA. Message left for her to call with order. 2/5/14  Yes Wilfred Alcocer MD   esomeprazole (NEXIUM) 20 MG capsule Take 20 mg by mouth Every Morning Before Breakfast. 9/21/15  Yes Wilfred Alcocer MD   meloxicam (MOBIC) 15 MG tablet Take 15 mg by mouth Daily. LD 6/12 5/12/20  Yes Wilfred Alcocer MD   diazePAM (VALIUM) 10 MG tablet Take 10mg  night before the procedure. Take 10mg on arrival to hospital. Take 10mg  Prn only  getting into MRI  Patient taking differently: 10 mg. Take 10mg  night before the procedure. Take 10mg on arrival to hospital. Take 10mg  Prn only  getting into MRI 3/18/20   Mike Redd MD   meclizine (ANTIVERT) 25 MG tablet Take 1 tablet by mouth 3 (Three) Times a Day As Needed for Dizziness. 6/7/20   William Spivey MD   metoprolol tartrate (LOPRESSOR) 50 MG tablet TAKE ONE TABLET BY MOUTH TWICE A DAY 6/15/20   Mike Redd MD   Omega-3 Fatty Acids (FISH OIL) 1000 MG capsule capsule 1 capsule Every 12 (Twelve) Hours. 3/22/18   Wilfred Alcocer MD   ondansetron ODT (ZOFRAN-ODT)  "4 MG disintegrating tablet Place 1 tablet on the tongue Every 8 (Eight) Hours As Needed for Nausea or Vomiting. 20   William Spivey MD   tadalafil (CIALIS) 20 MG tablet Take 1 tablet by mouth Daily As Needed for erectile dysfunction. 19   Mike Redd MD   ·   · No Known Allergies  ·   · There is no immunization history on file for this patient.  Social History     Tobacco Use   • Smoking status: Former Smoker     Last attempt to quit: 1999     Years since quittin.5   • Smokeless tobacco: Never Used   Substance Use Topics   • Alcohol use: No     Frequency: Never   ·    Social History     Substance and Sexual Activity   Drug Use No   ·     REVIEW OF SYSTEMS:  · Head: negative for headache  · Respiratory: negative for shortness of breath.   · Cardiovascular: negative for chest pain.   · Gastrointestinal: negative abdominal pain.   · Neurological: negative for LOC  · Psychiatric/Behavioral: negative for memory loss.   · All other systems reviewed and are negative    VITALS: Ht 177.8 cm (70\")   Wt 135 kg (297 lb)   BMI 42.62 kg/m²   Body mass index is 42.62 kg/m².    PHYSICAL EXAM:   · CONSTITUTIONAL: A&Ox3, No acute distress  · LUNGS: Equal chest rise, no shortness of air  · CARDIOVASCULAR: palpable peripheral pulses  · SKIN: no skin lesions in the area examined  · LYMPH: no lymphadenopathy in the area examined  · EXTREMITY: Knee  · Pulses:  Brisk Capillary Refill  · Sensation: Intact to Saphenous, Sural, Deep Peroneal, Superficial Peroneal, and Tibial Nerves and grossly throughout extremity  · Motor: 5/5 EHL/FHL/TA/GS motor complexes    RADIOLOGY REVIEW:   Xr Chest Pa & Lateral    Result Date: 2020  Mildly enlarged cardiac silhouette with prominent pulmonary vessels, suggestive of pulmonary vascular congestion/edema.   Electronically Signed By-Ju House On:2020 2:38 PM This report was finalized on [id number] by  Ju House, .      LABS:   Results for the past 24 hours: " No results found for this or any previous visit (from the past 24 hour(s)).    IMPRESSION:  Patient is a 60 y.o. Not  or  male with end-stage arthritis of the left knee    PLAN:   · Surgery: Elective total knee arthroplasty  · Consent: The risks and benefits of operative versus nonoperative treatment were discussed. The patient elected to undergo operative treatment of their knee arthritis. The risks discussed included but were not limited to blood clots, MI, stroke, other medical complications, infection, damage to neurovascular structures, continued pain, hardware prominence, loss of range of motion, need for further procedures, and and risk of anesthesia..  No guarantees were made   · Disposition: Elective left Total Knee Arthroplasty today.    Bouchra Smyth II, MD  Orthopaedic Surgery  [facility]      Electronically signed by Bouchra Smyth II, MD at 20 0944       [facility] SURGICAL INPATIENT  [address] [place] [address]  Phone:  [phone]  Fax:   Date: 2020      Ambulatory Referral to Home Health     Patient:  Flaco Yan MRN:  [id number]   [address] [facility] [place] [address] :  1960  SSN:    Phone: [phone] Sex:  M      INSURANCE PAYOR PLAN GROUP # SUBSCRIBER ID   Primary:    [id number] [id number]      Referring Provider Information:  BOUCHRA SMYTH II Phone: [phone] Fax:       Referral Information:   # Visits:  1 Referral Type: Home Health [42]   Urgency:  Routine Referral Reason: Specialty Services Required   Start Date: 2020 End Date:  To be determined by Insurer   Diagnosis: S/P total knee arthroplasty, left (Z96.652 [ICD-10-CM] V43.65 [ICD-9-CM])      Refer to Dept: [facility]  Refer to Provider:   Refer to Facility:       Face to Face Visit Date: 2020  Follow-up provider for Plan of Care? I will be treating the patient on an ongoing basis.  Please send  me the Plan of Care for signature.  Follow-up provider: FERNIE SMYTH II [[id number]]  Reason/Clinical Findings: post-hospital evaluation  Describe mobility limitations that make leaving home difficult: weakness. L TKR  Nursing/Therapeutic Services Requested: Physical Therapy (HHC to eval)  Nursing/Therapeutic Services Requested: Other  PT orders: Total joint pathway  Frequency: 1 Week 1     This document serves as a request of services and does not constitute Insurance authorization or approval of services.  To determine eligibility, please contact the members Insurance carrier to verify and review coverage.     If you have medical questions regarding this request for services. Please contact [facility] SURGICAL INPATIENT at [phone] during normal business hours.       Verbal Order Mode: Telephone with readback   Authorizing Provider: Fernie Smyth II, MD  Authorizing Provider's NPI: [id number]     Order Entered By: Cecy Aragon RN 6/20/2020 11:03 AM

## 2020-06-20 NOTE — DISCHARGE SUMMARY
Orthopaedic Discharge Summary  Dr. SALLY Peoples II  [phone]    NAME: Flaco Yan PCP: Mike Redd MD   :  MRN: [id number]  [id number] LOS:  ADMIT: 0 days  2020   AGE/SEX: 60 y.o. male DC:  today             · Admitting Diagnosis: Osteoarthritis, knee [M17.10]  · Total knee replacement status [Z96.659]  · S/P total knee arthroplasty, left [Z96.652]    · Surgery Performed: LEFT TOTAL KNEE    · Discharge Medications:         Discharge Medications      New Medications      Instructions Start Date   oxyCODONE-acetaminophen 5-325 MG per tablet  Commonly known as:  PERCOCET   1 tablet, Oral, Every 4 Hours PRN         Changes to Medications      Instructions Start Date   amLODIPine 5 MG tablet  Commonly known as:  NORVASC  What changed:  when to take this   5 mg, Oral, Every 24 Hours         Continue These Medications      Instructions Start Date   aspirin 325 MG tablet   325 mg, Oral, Nightly,  @1400 Call out to Cecy at Dr Peopels office as to stopping ASA. Message left for her to call with order.      meloxicam 15 MG tablet  Commonly known as:  MOBIC   15 mg, Oral, Daily, LD       metoprolol tartrate 50 MG tablet  Commonly known as:  LOPRESSOR   TAKE ONE TABLET BY MOUTH TWICE A DAY      nexIUM 20 MG capsule  Generic drug:  esomeprazole   20 mg, Oral, Every Morning Before Breakfast         ASK your doctor about these medications      Instructions Start Date   diazePAM 10 MG tablet  Commonly known as:  VALIUM   Take 10mg  night before the procedure. Take 10mg on arrival to hospital. Take 10mg  Prn only  getting into MRI      fish oil 1000 MG capsule capsule   1 capsule, Every 12 Hours      meclizine 25 MG tablet  Commonly known as:  ANTIVERT   25 mg, Oral, 3 Times Daily PRN      ondansetron ODT 4 MG disintegrating tablet  Commonly known as:  ZOFRAN-ODT   4 mg, Translingual, Every 8 Hours PRN      tadalafil 20 MG tablet  Commonly known as:  Cialis   20 mg, Oral, Daily PRN        "      · Vitals:     Vitals:    06/19/20 1649 06/19/20 2035 06/20/20 0000 06/20/20 0400   BP: (!) 166/102 120/70 128/78 127/71   BP Location: Right arm Right arm     Patient Position: Lying Lying     Pulse: 80 85 82 61   Resp: 16 18 16 16   Temp:  97.6 °F (36.4 °C) 97.8 °F (36.6 °C) 97.1 °F (36.2 °C)   TempSrc: Oral Oral Oral Oral   SpO2: 97% 96% 96% 95%   Weight: 130 kg (286 lb 9.6 oz)      Height: 177.8 cm (70\")          · Labs:      Admission on 06/19/2020   Component Date Value Ref Range Status   • Glucose 06/20/2020 138* 65 - 99 mg/dL Final   • BUN 06/20/2020    Final    Testing performed by alternate method   • Creatinine 06/20/2020 0.95  0.76 - 1.27 mg/dL Final   • Sodium 06/20/2020 138  136 - 145 mmol/L Final   • Potassium 06/20/2020 4.5  3.5 - 5.2 mmol/L Final   • Chloride 06/20/2020 106  98 - 107 mmol/L Final   • CO2 06/20/2020 22.0  22.0 - 29.0 mmol/L Final   • Calcium 06/20/2020 8.4* 8.6 - 10.5 mg/dL Final   • eGFR Non African Amer 06/20/2020 81  >60 mL/min/1.73 Final   • BUN/Creatinine Ratio 06/20/2020    Final    Testing not performed   • Anion Gap 06/20/2020 10.0  5.0 - 15.0 mmol/L Final   • WBC 06/20/2020 12.10* 3.40 - 10.80 10*3/mm3 Final   • RBC 06/20/2020 4.16  4.14 - 5.80 10*6/mm3 Final   • Hemoglobin 06/20/2020 12.6* 13.0 - 17.7 g/dL Final   • Hematocrit 06/20/2020 38.3  37.5 - 51.0 % Final   • MCV 06/20/2020 92.3  79.0 - 97.0 fL Final   • MCH 06/20/2020 30.3  26.6 - 33.0 pg Final   • MCHC 06/20/2020 32.9  31.5 - 35.7 g/dL Final   • RDW 06/20/2020 13.1  12.3 - 15.4 % Final   • RDW-SD 06/20/2020 42.0  37.0 - 54.0 fl Final   • MPV 06/20/2020 8.3  6.0 - 12.0 fL Final   • Platelets 06/20/2020 260  140 - 450 10*3/mm3 Final        No results found.    · Hospital Course:   60 y.o. male was admitted to [facility] to services of Fernie Peoples II, MD with Osteoarthritis, knee [M17.10]  Total knee replacement status [Z96.659]  S/P total knee arthroplasty, left [Z96.652] on 6/19/2020 and " "underwent LEFT TOTAL KNEE. Post-operatively the patient transferred to the floor where the patient underwent mobilization therapy. Opioids were titrated to achieve appropriate pain management to allow for participation in mobilization exercises. Vital signs and laboratory values are now within safe parameters for discharge. The dressings and/or incision is intact without signs or symptoms of active infection. Operative extremity neurovascular status remains intact as compared to the preoperative exam. Appropriate education re: incision care, activity levels, medications, and follow up visits was completed and all questions were answered. The patient is now deemed stable for discharge.    HOME: The patient progressed well with physical therapy. There were cleared for discharge to home. The patietn was sent home in good condition}.       R \"William\" Richelle MOSHER MD  Orthopaedic Surgery  [facility]  [phone]                                               "

## 2020-06-20 NOTE — ANESTHESIA POSTPROCEDURE EVALUATION
Patient: Flaco Yan    Procedure Summary     Date:  06/19/20 Room / Location:  [facility] OR  / [facility] MAIN OR    Anesthesia Start:  1337 Anesthesia Stop:  1520    Procedure:  LEFT TOTAL KNEE (Left Knee) Diagnosis:       Osteoarthritis, knee      (Osteoarthritis, knee [M17.10])    Surgeon:  Fernie Peoples II, MD Provider:  Justin Jett DO    Anesthesia Type:  general with block ASA Status:  3          Anesthesia Type: general with block    Vitals  Vitals Value Taken Time   /84 6/19/2020  4:22 PM   Temp 98.1 °F (36.7 °C) 6/19/2020  4:18 PM   Pulse 82 6/19/2020  4:23 PM   Resp 16 6/19/2020  4:18 PM   SpO2 94 % 6/19/2020  4:23 PM   Vitals shown include unvalidated device data.        Post Anesthesia Care and Evaluation    Patient location during evaluation: PACU  Patient participation: complete - patient participated  Level of consciousness: awake  Pain scale: See nurse's notes for pain score.  Pain management: adequate  Airway patency: patent  Anesthetic complications: No anesthetic complications  PONV Status: none  Cardiovascular status: acceptable  Respiratory status: acceptable  Hydration status: acceptable    Comments: Patient seen and examined postoperatively; vital signs stable; SpO2 greater than or equal to 90%; cardiopulmonary status stable; nausea/vomiting adequately controlled; pain adequately controlled; no apparent anesthesia complications; patient discharged from anesthesia care when discharge criteria were met

## 2020-06-20 NOTE — DISCHARGE INSTRUCTIONS
Total Knee  Discharge Instructions  Dr. SALLY Tracy” [place] II  [phone]    • INCISION CARE  o Wash your hands prior to dressing changes  o GILBERTO Wound VAC: Postoperatively you had a GILBERTO Wound Vac placed on the incision. This was placed under sterile conditions in the operating room. It remains in place for 7 days postoperatively. After 7 days, the entire dressing must be removed, including all of the sticky adhesive. The dressing and battery pack provide gentle suction to the incision and provide several benefits over a traditional dressing:  - It maintains the sterile environment of the OR and reduces the risk of infection  - The suction removes unwanted buildup of blood/hematoma under the skin to reduce swelling  - The suction also promotes fresh blood supply to the skin and soft tissue to speed up healing  - The postoperative scar is reduced in size  - Showering is permitted immediately after surgery, but the battery pack must be protected or removed during the shower.   o After 7 days the GILBERTO Wound Vac is removed. If there is no drainage, no dressing is required. If there is some scant drainage a dry bandage can be applied and changed daily until seen in the office or until the drainage stops.   o No creams or ointments to the incisions until 4 weeks post op.  o Do not touch or pick at the incision  o Check incision every day and notify surgeon immediately if any of the following signs or symptoms are seen:  - Increase in redness  - Increase in swelling around the incision and of the entire extremity  - Increase in pain  - NEW drainage or oozing from the incision  - Pulling apart of the edges of the incision  - Increase in overall body temperature (greater than 100.4 degrees)  o Zip-Line: your incision was closed with a state of the art device.   - Is a non-invasive and easy to use wound closure device that replaces sutures, staples and glue for surgical incisions  - It minimizes scarring and eliminating  “railroad” marks that come with staples or sutures  - It makes removal as atraumatic as peeling off a bandage  - Can be removed at home or by a physical therapist or nurse at 14 days postoperatively    • ACTIVITIES  o Exercises:  - Physical therapy will begin immediately while in the hospital. Patients going to a nursing home will get therapy as part of their care at the SNU/SNF facility. Patients going home may also have a therapist come to the house to help them mobilize until they can safely get to an outpatient therapy facility.  - Elevate the affected leg most of the day during the first week post operatively. Caution must be taken to avoid pillow placement directly under the heel of the leg, as this can cause pressure ulcers even with a soft pillow. All pillows and blankets should be placed underneath of the thigh and calf so that the heel is free-floating.  - Use cold packs for 20-30 minutes approximately 5 times per day.  - You should perform the daily stretching and strengthening exercises as taught by the therapist as often as possible. This can be done many times a day.  - Full weight bearing is allowed after surgery. It will be sore/painful to put weight on the leg, but this will help the bone to heal and prevent complications such as pneumonia, bed sores and blood clots. Mobilization is vital to the recovery process.  o Activities of Daily Living:  - No tub baths, hot tubs, or swimming pools for 4 weeks.  - May shower and let water run over the incision immediately after surgery. The battery pack of the GILBERTO Wound Vac must be protected or removed while in the shower. After the GILBERTO is removed 7 days after surgery showering is permitted as long as there is no drainage from the wound.     • Restrictions  o Weight: It is ok to allow full weight bearing after surgery. Weight on the leg actually quickens the recovery process. While it will be sore/painful to put weight on the leg, it is safe to do so. Hip  replacement after hip fracture has a much slower recover process. It can take months to heal fully from a hip fracture and patients even make some slow benefits up to a year afterwards.   o Driving: Many patients have questions about when it is safe to return to driving. The answer is that this is extremely variable. It depends on the extent of the surgery, as well as how quickly you heal. Certainly left leg surgeries make returning to driving easier while right leg surgeries require more extensive rehabilitation before driving can be safe. Until you can press down on the brake hard, and are off of all narcotics, driving is not permitted. Your surgeon cannot “clear” you to return to driving, only you can make the decision when you feel it is safe.    • Medications  o Anticoagulants: After upper extremity surgery most patients do not require an anticoagulant unless you have another injury that will be keeping you from mobilizing. Lower extremity surgery typically does require use of an anticoagulation medicine.   - IF YOU HAD LOWER EXTREMITY SURGERY AND ARE NOT DISCHARGED HOME WITH ANY ANTICOAGULANT MEDICINE YOU SHOULD TAKE ASPIRIN 325mg DAILY FOR 30 DAYS POSTOPERATIVELY.  - If you are discharged home with an anticoagulant such as Aspirin, Xarelto, Eliquis, Coumadin, or Lovenox, follow these simple instructions:   - Notify surgeon immediately if any jose daniel bleeding is noted in the urine, stool, emesis, or from the nose or the incision. Blood in the stool will often appear as black rather than red. Blood in urine may appear as pink. Blood in emesis may appear as brown/black like coffee grounds.  - You will need to apply pressure for longer periods of time to any cuts or abrasions to stop bleeding  - Avoid alcohol while taking anticoagulants  - Most anticoagulants are to be taken for 30 days postoperatively. After this time, you may stop using them unless instructed otherwise.   - If you were already taking an  anticoagulant (commonly Aspirin, Coumadin, or Plavix) you will likely be resuming your normal dose postoperatively and will be continuing that medication at the discretion of the prescribing physician.  o Stool Softeners: You will be at greater risk of constipation after surgery due to being less mobile and the pain medications.  - Take stool softeners as needed. Over the counter Colace 100 mg 1-2 capsules twice daily can be taken.  - If stools become too loose or too frequent, please decreases the dosage or stop the stool softener.  - If constipation occurs despite use of stool softeners, you are to continue the stool softeners and add a laxative (Milk of Magnesia 1 ounce daily as needed)  - Drink plenty of fluids, and eat fruits and vegetables during your recovery time. Getting up and mobilizing will help the bowels to recover their regular function, as will weaning off of all narcotics when the pain becomes tolerable.  o Pain Medications: Utilized after surgery are narcotics. This is some general information about these medications.  - CLASSIFICATION: Pain medications are called Opioids and are narcotics  - LEGALITIES: It is illegal to share narcotics with others  - DRIVING: it is illegal to drive while under the influence of narcotics. Doing so is a DUI.  - POTENTIAL SIDE EFFECTS: nausea, vomiting, itching, dizziness, drowsiness, dry mouth, constipation, and difficulty urinating.  - POTENTIAL ADVERSE EFFECTS:  - Opioid tolerance can develop with use of pain medications and this simply means that it requires more and more of the medication to control pain. However, this is seen more in patients that use opioids for longer periods of time.  - Opioid dependence can develop with use of Opioids. People with opioid dependence will experience withdrawal symptoms upon cessation of the medication.  - Opioid addiction can develop with use of Opioids. The incidence of this is very unlikely in patients who take the  medications as ordered and stop the medications as instructed.  - Opioid overdose can be dangerous, but is unlikely when the medication is taken as ordered and stopped when ordered. It is important not to mix opioids with alcohol as this can lead to over sedation and respiratory difficulty.  - DOSAGE:  - After the initial surgical pain begins to resolve, you may begin to decrease the pain medication. By the end of a few weeks, you should be off of pain medications.  - Refills will not be given by the office during evening hours, on weekends, or after 6 weeks post-op. You are responsible for weaning off of pain medication. You can increase the time between narcotic pills, taking one every 4 then 6 then 8 hours and so on.  - To seek refills on pain medications during the initial 6-week post-operative period, you must call the office to request the refill. The office will then notify you when to  the prescription. DO NOT wait until you are out of the medication to request a refill. Prescriptions will not be filled over the weekend and depending on the schedule, it may take a couple days for the prescription to be available. Someone will have to pick the prescription in person at the office.    • FOLLOW-UP VISITS  o You will need to follow up in the office with your surgeon in 3 weeks, or as instructed elsewhere in your discharge paperwork. Please call this number [phone] to schedule this appointment. If you are going to an SNF/SNU facility, they will arrange for you to follow up in the office.  o If you have any concerns or suspected complications prior to your follow up visit, please call the office. Do not wait until your appointment time if you suspect complications. These will need to be addressed in the office promptly.      Fernie Peoples II, MD  Orthopaedic Surgery  [facility]

## 2020-06-20 NOTE — THERAPY EVALUATION
Patient Name: Flaco Yan  : 1960    MRN: [id number]                              Today's Date: 2020       Admit Date: 2020    Visit Dx:     ICD-10-CM ICD-9-CM   1. Status post total knee replacement, unspecified laterality Z96.659 V43.65   2. S/P total knee arthroplasty, left Z96.652 V43.65     Patient Active Problem List   Diagnosis   • Abnormal result of cardiovascular function study   • At risk for falls   • Chest wall pain   • Coronary artery disease   • Gout   • Falling, jumping or pushed from a high place, undetermined intent, initial encounter   • Heartburn   • Hx of adverse drug reaction   • Hyperlipidemia   • Hypertension   • Testosterone deficiency   • Myalgia   • Knee pain   • Body mass index 40.0-44.9, adult (CMS/[facility])   • Morbid (severe) obesity due to excess calories (CMS/[facility])   • Obesity   • Osteoarthritis of knee   • Polyosteoarthritis   • Overweight   • Preoperative evaluation to rule out surgical contraindication   • Presence of aortocoronary bypass graft   • Primary localized osteoarthritis   • ST elevation (STEMI) myocardial infarction (CMS/[facility])   • Status post coronary artery stent placement   • Numbness   • Atypical chest pain   • Total knee replacement status   • S/P total knee arthroplasty, left     Past Medical History:   Diagnosis Date   • Coronary artery disease    • GERD (gastroesophageal reflux disease)    • Hyperlipidemia    • Hypertension    • Obesity    • Sleep apnea     no machine     Past Surgical History:   Procedure Laterality Date   • CARDIAC CATHETERIZATION     • CARDIAC SURGERY     • CORONARY ARTERY BYPASS GRAFT     • CORONARY STENT PLACEMENT     • FINGER SURGERY       General Information     Row Name 20 1044          PT Evaluation Time/Intention    Document Type  evaluation  -WC     Mode of Treatment  physical therapy  -WC     Row Name 20 1044          General Information    Prior Level of Function  independent: WBAT  -WC     Barriers to Rehab   none identified  -     Row Name 06/20/20 1044          Relationship/Environment    Lives With  spouse  -     Row Name 06/20/20 1044          Resource/Environmental Concerns    Current Living Arrangements  home/apartment/condo  -     Row Name 06/20/20 1044          Home Main Entrance    Number of Stairs, Main Entrance  three Pt will have 2 person assist as needed for stairs at home  -     Stair Railings, Main Entrance  none  -     Row Name 06/20/20 1044          Stairs Within Home, Primary    Number of Stairs, Within Home, Primary  other (see comments) Flight of stairs  -     Row Name 06/20/20 1044          Cognitive Assessment/Intervention- PT/OT    Orientation Status (Cognition)  oriented x 4  -     Row Name 06/20/20 1044          Safety Issues, Functional Mobility    Impairments Affecting Function (Mobility)  endurance/activity tolerance;pain;strength;range of motion (ROM)  -     Comment, Safety Issues/Impairments (Mobility)  Knee Flexion 90, knee ext -10  -       User Key  (r) = Recorded By, (t) = Taken By, (c) = Cosigned By    Initials Name Provider Type    Rachelle Robledo, PT Physical Therapist        Mobility     Row Name 06/20/20 1048          Bed Mobility Assessment/Treatment    Bed Mobility Assessment/Treatment  rolling left;rolling right;supine-sit;sit-supine  -     Rolling Left [place] (Bed Mobility)  conditional independence  -     Rolling Right [place] (Bed Mobility)  conditional independence  -     Supine-Sit [place] (Bed Mobility)  conditional independence  -     Sit-Supine [place] (Bed Mobility)  conditional independence  -     Assistive Device (Bed Mobility)  bed rails  -     Row Name 06/20/20 1048          Bed-Chair Transfer    Bed-Chair [place] (Transfers)  supervision  -     Assistive Device (Bed-Chair Transfers)  walker, front-wheeled  -     Row Name 06/20/20 1048          Sit-Stand Transfer    Sit-Stand [place] (Transfers)   supervision  -     Assistive Device (Sit-Stand Transfers)  walker, front-wheeled  -     Row Name 06/20/20 1048          Gait/Stairs Assessment/Training    Gait/Stairs Assessment/Training  gait/ambulation independence;distance ambulated  -     [place] Level (Gait)  supervision  -     Assistive Device (Gait)  walker, front-wheeled  -WC     Distance in Feet (Gait)  80 feet   -     Pattern (Gait)  step-to  -     Deviations/Abnormal Patterns (Gait)  base of support, wide  -     Assistive Device (Stairs)  walker, front-wheeled  -WC       User Key  (r) = Recorded By, (t) = Taken By, (c) = Cosigned By    Initials Name Provider Type     Rachelle Tovar PT Physical Therapist        Obj/Interventions     Row Name 06/20/20 1049          General ROM    GENERAL ROM COMMENTS  left knee ROM 90 degrees of flexion to lacking 10 degrees of extension  -     Row Name 06/20/20 1049          Therapeutic Exercise    Lower Extremity Range of Motion (Therapeutic Exercise)  hip flexion/extension, left;hip flexion/extension, right;knee flexion/extension, left;knee flexion/extension, right;ankle dorsiflexion/plantar flexion, bilateral  -     Exercise Type (Therapeutic Exercise)  AAROM (active assistive range of motion);AROM (active range of motion)  -     Position (Therapeutic Exercise)  seated;supine  -     Sets/Reps (Therapeutic Exercise)  10 ea  -     Row Name 06/20/20 1049          Static Sitting Balance    Level of [place] (Unsupported Sitting, Static Balance)  independent  -     Sitting Position (Unsupported Sitting, Static Balance)  sitting on edge of bed  -     Time Able to Maintain Position (Unsupported Sitting, Static Balance)  more than 5 minutes  -     Row Name 06/20/20 1049          Dynamic Sitting Balance    Level of [place], Reaches Outside Midline (Sitting, Dynamic Balance)  independent  -     Sitting Position, Reaches Outside Midline (Sitting, Dynamic Balance)  sitting on edge of  bed  -     Row Name 06/20/20 1049          Static Standing Balance    Level of [place] (Supported Standing, Static Balance)  supervision  -     Time Able to Maintain Position (Supported Standing, Static Balance)  4 to 5 minutes  -     Assistive Device Utilized (Supported Standing, Static Balance)  walker, rolling  -     Row Name 06/20/20 1049          Dynamic Standing Balance    Level of [place], Reaches Outside Midline (Standing, Dynamic Balance)  supervision  -     Time Able to Maintain Position, Reaches Outside Midline (Standing, Dynamic Balance)  4 to 5 minutes  -     Assistive Device Utilized (Supported Standing, Dynamic Balance)  walker, rolling  -       User Key  (r) = Recorded By, (t) = Taken By, (c) = Cosigned By    Initials Name Provider Type    Rachelle Robledo, PT Physical Therapist        Goals/Plan     Row Name 06/20/20 1052          Transfer Goal 1 (PT)    Activity/Assistive Device (Transfer Goal 1, PT)  sit-to-stand/stand-to-sit;bed-to-chair/chair-to-bed  -     [place] Level/Cues Needed (Transfer Goal 1, PT)  conditional independence  -     Time Frame (Transfer Goal 1, PT)  short term goal (STG);2 weeks  -     Row Name 06/20/20 1052          Gait Training Goal 1 (PT)    Activity/Assistive Device (Gait Training Goal 1, PT)  gait (walking locomotion);increase endurance/gait distance;improve balance and speed;forward stepping  -     [place] Level (Gait Training Goal 1, PT)  conditional independence  -     Distance (Gait Goal 1, PT)  150 feet  -     Time Frame (Gait Training Goal 1, PT)  short term goal (STG);2 weeks  -     Row Name 06/20/20 1052          ROM Goal 1 (PT)    ROM Goal 1 (PT)  Left knee flex 110- 0 degrees extension  -     Time Frame (ROM Goal 1, PT)  2 weeks;short term goal (STG)  -     Row Name 06/20/20 1052          Stairs Goal 1 (PT)    Activity/Assistive Device (Stairs Goal 1, PT)  stairs, all skills;ascending stairs;descending stairs   -WC     [place] Level/Cues Needed (Stairs Goal 1, PT)  conditional independence  -WC     Number of Stairs (Stairs Goal 1, PT)  12  -WC     Time Frame (Stairs Goal 1, PT)  short term goal (STG);2 weeks  -     Row Name 06/20/20 1052          Patient Education Goal (PT)    Activity (Patient Education Goal, PT)  TKA exercises  -     [place]/Cues/Accuracy (Memory Goal 2, PT)  demonstrates adequately;verbalizes understanding  -       User Key  (r) = Recorded By, (t) = Taken By, (c) = Cosigned By    Initials Name Provider Type    Rachelle Robledo, PT Physical Therapist        Clinical Impression     Row Name 06/20/20 1051          Pain Assessment    Additional Documentation  Pain Scale: Numbers Pre/Post-Treatment (Group)  -[facility] Name 06/20/20 1051          Pain Scale: Numbers Pre/Post-Treatment    Pain Scale: Numbers, Pretreatment  0/10 - no pain  -     Pain Scale: Numbers, Post-Treatment  3/10  -WC     Pain Location - Side  Left  -WC     Pain Location  knee  -     Pain Intervention(s)  Repositioned;Medication (See MAR)  -     Row Name 06/20/20 1051          Plan of Care Review    Plan of Care Reviewed With  patient  -     Row Name 06/20/20 1056 06/20/20 1051       Physical Therapy Clinical Impression    Criteria for Skilled Interventions Met (PT Clinical Impression)  --  yes;treatment indicated  -    Predicted Duration of Therapy (PT)  Until D/C  -WC  --    Row Name 06/20/20 1051          Vital Signs    Pre Patient Position  Supine  -WC     Intra Patient Position  Standing  -WC     Post Patient Position  Supine  -     Row Name 06/20/20 1051          Positioning and Restraints    Pre-Treatment Position  in bed  -WC     Post Treatment Position  bed  -       User Key  (r) = Recorded By, (t) = Taken By, (c) = Cosigned By    Initials Name Provider Type    Rachelle Robledo, PT Physical Therapist        Outcome Measures     Row Name 06/20/20 1055          How much help from another person do  you currently need...    Turning from your back to your side while in flat bed without using bedrails?  4  -WC     Moving from lying on back to sitting on the side of a flat bed without bedrails?  4  -WC     Moving to and from a bed to a chair (including a wheelchair)?  4  -WC     Standing up from a chair using your arms (e.g., wheelchair, bedside chair)?  3  -WC     Climbing 3-5 steps with a railing?  3  -WC     To walk in hospital room?  3  -WC     AM-PAC 6 Clicks Score (PT)  21  -     Row Name 06/20/20 1055          Functional Assessment    Outcome Measure Options  AM-PAC 6 Clicks Basic Mobility (PT)  -       User Key  (r) = Recorded By, (t) = Taken By, (c) = Cosigned By    Initials Name Provider Type    Rachelle Robledo, JAVIER Physical Therapist        Physical Therapy Education                 Title: PT OT SLP Therapies (Done)     Topic: Physical Therapy (Done)     Point: Mobility training (Done)     Description:   Instruct learner(s) on safety and technique for assisting patient out of bed, chair or wheelchair.  Instruct in the proper use of assistive devices, such as walker, crutches, cane or brace.              Patient Friendly Description:   It's important to get you on your feet again, but we need to do so in a way that is safe for you. Falling has serious consequences, and your personal safety is the most important thing of all.        When it's time to get out of bed, one of us or a family member will sit next to you on the bed to give you support.     If your doctor or nurse tells you to use a walker, crutches, a cane, or a brace, be sure you use it every time you get out of bed, even if you think you don't need it.    Learning Progress Summary           Patient Acceptance, E,TB, VU by  at 6/20/2020 1055                   Point: Home exercise program (Done)     Description:   Instruct learner(s) on appropriate technique for monitoring, assisting and/or progressing patient with therapeutic exercises  and activities.              Learning Progress Summary           Patient Acceptance, E,TB, VU by  at 6/20/2020 1055                   Point: Body mechanics (Done)     Description:   Instruct learner(s) on proper positioning and spine alignment for patient and/or caregiver during mobility tasks and/or exercises.              Learning Progress Summary           Patient Acceptance, E,TB, VU by  at 6/20/2020 1055                   Point: Precautions (Done)     Description:   Instruct learner(s) on prescribed precautions during mobility and gait tasks              Learning Progress Summary           Patient Acceptance, E,TB, VU by  at 6/20/2020 1055                               User Key     Initials Effective Dates Name Provider Type Discipline     01/07/20 -  Rachelle Tovar PT Physical Therapist PT              PT Recommendation and Plan  Planned Therapy Interventions (PT Eval): gait training, home exercise program, patient/family education, ROM (range of motion), stair training, strengthening, transfer training  Outcome Summary/Treatment Plan (PT)  Anticipated Discharge Disposition (PT): home with home health  Plan of Care Reviewed With: patient     Time Calculation:   PT Charges     Row Name 06/20/20 1106             Time Calculation    Start Time  1008  -WC      Stop Time  1040  -      Time Calculation (min)  32 min  -      PT Received On  06/20/20  -      PT - Next Appointment  06/21/20  -      PT Goal Re-Cert Due Date  07/04/20  -        User Key  (r) = Recorded By, (t) = Taken By, (c) = Cosigned By    Initials Name Provider Type     Rachelle Tovar PT Physical Therapist        Therapy Charges for Today     Code Description Service Date Service Provider Modifiers Qty    63231007212 HC PT EVAL MOD COMPLEXITY 3 6/20/2020 Rachelle Tovar PT GP 1          PT G-Codes  Outcome Measure Options: AM-PAC 6 Clicks Basic Mobility (PT)  AM-PAC 6 Clicks Score (PT): 21    Rachelle Tovar PT  6/20/2020

## 2020-06-20 NOTE — PROGRESS NOTES
"  Orthopaedic Surgery   Daily Progress Note  Dr. SALLY Tracy” Richelle MOSHER  [phone]  DEMOGRAPHICS:   · Flaco Yan   · Age:60 y.o.   · MRN:[id number]  · Admitted: 6/19/2020    PROCEDURE: 1 Day Post-Op s/p Procedure(s):  LEFT TOTAL KNEE     HOSPITAL PROGRESS  · Patient Issues: No major issues, has done quite well since surgery.  Patient very pleased  · Ambulation/Activity: Ambulating well with PT/Nursing.      VITALS:  Vitals:    06/19/20 1649 06/19/20 2035 06/20/20 0000 06/20/20 0400   BP: (!) 166/102 120/70 128/78 127/71   BP Location: Right arm Right arm     Patient Position: Lying Lying     Pulse: 80 85 82 61   Resp: 16 18 16 16   Temp:  97.6 °F (36.4 °C) 97.8 °F (36.6 °C) 97.1 °F (36.2 °C)   TempSrc: Oral Oral Oral Oral   SpO2: 97% 96% 96% 95%   Weight: 130 kg (286 lb 9.6 oz)      Height: 177.8 cm (70\")          PHYSICAL EXAM:  · LUNGS: Equal chest rise, no shortness of air  · CARDIOVASCULAR: brisk capillary refill intact  · WOUND: GILBERTO Wound Vac System working in good condition, minimal drainage  · EXTREMITY: Operative Leg  · Pulses: brisk capillary refill intact  · Sensation: Sensation intact to light touch to the saphenous/sural/tibial/deep peroneal/superficial peroneal nerves, and grossly throughout the extremity.  · Motor: 5/5 EHL/FHL/TA/GS motor complexes    LABS:   Lab Results   Component Value Date    HGB 12.6 (L) 06/20/2020     Lab Results   Component Value Date    WBC 12.10 (H) 06/20/2020     Lab Results   Component Value Date    GLUCOSE 138 (H) 06/20/2020    CALCIUM 8.4 (L) 06/20/2020     06/20/2020    K 4.5 06/20/2020    CO2 22.0 06/20/2020     06/20/2020    BUN  06/20/2020      Comment:      Testing performed by alternate method    CREATININE 0.95 06/20/2020    EGFRIFNONA 81 06/20/2020    BCR  06/20/2020      Comment:      Testing not performed    ANIONGAP 10.0 06/20/2020       ASSESSMENT: Patient is a 60 y.o. male who is 1 Day Post-Op s/p Procedure(s):  LEFT TOTAL KNEE     PLAN: " "  · Weight Bearing: Weight Bearing As Tolerated  · Labs: Above lab values review. Plan: No intervention necessary at this time.   · PT/OT: To Mobilize  · DVT PPX: Resume home ASA  · Post-Op Xray: TKA implants in good alignment.   · Follow-Up: In office at 3 weeks postop  · Dispo: Home discharge today    R \"William\" Richelle MOSHER MD  Orthopaedic Surgery  [facility]  [phone] - [place] Office  [phone] - [place] Office      "

## 2020-06-20 NOTE — PLAN OF CARE
Ambulated from his bed out in hallway , touched the water fountain and then returned to bed. Was pleasantly surprised about how his pain is already better than before surg.

## 2020-06-20 NOTE — PROGRESS NOTES
Discharge Planning Assessment   [facility]     Patient Name: Flaco Yan  MRN: [id number]  Today's Date: 6/20/2020    Admit Date: 6/19/2020      Discharge Plan     Row Name 06/20/20 1058       Plan    Plan  DC plan: home with [facility].    Provided Post Acute Provider List?  Yes    Post Acute Provider List  Home Health    Provided Post Acute Provider Quality & Resource List?  Refused    Delivered To  Patient    Method of Delivery  Telephone    Plan Comments  CM called pt in room to discuss d/c planning due to Covid-19  pandemic. Pt states that he has a cane and walker available. PT suggesting HHC s/p left TKR. Order verified. Referral sent via [employer] for [facility] and called to [phone].            Home Medical Care      Service Provider Request Status Selected Services Address Phone Number Fax Number    [facility] Selected Home Health Services [address] [place] [address] [phone]        Expected Discharge Date and Time     Expected Discharge Date Expected Discharge Time    Jun 20, 2020               Cecy Aragon RN

## 2020-06-22 ENCOUNTER — TRANSITIONAL CARE MANAGEMENT TELEPHONE ENCOUNTER (OUTPATIENT)
Dept: CALL CENTER | Facility: HOSPITAL | Age: 60
End: 2020-06-22

## 2020-06-22 NOTE — OUTREACH NOTE
Call Center TCM Note      Responses   [facility] patient discharged from?  [facility]   Does the patient have one of the following disease processes/diagnoses(primary or secondary)?  Total Joint Replacement   Joint surgery performed?  Knee   TCM attempt successful?  Yes   Call start time  1600   Call end time  1601   Discharge diagnosis  LEFT TOTAL KNEE   Does the patient have all medications related to this admission filled (includes all antibiotics, pain medications, etc.)  Yes   Does the patient have a follow up appointment with their surgeon?  Yes   Has the patient kept scheduled appointments due by today?  N/A   Psychosocial issues?  No   Did the patient receive a copy of their discharge instructions?  Yes   What is the patient's perception of their functional status since discharge?  Improving   Is the patient/caregiver able to teach back the hierarchy of who to call/visit for symptoms/problems? PCP, Specialist, Home health nurse, Urgent Care, ED, 911  Yes   TCM call completed?  Yes   Wrap up additional comments  Patient says he is doing well, he does not wish to f/u with PCP at this time.          Arlen Almonte RN    6/22/2020, 16:01

## 2020-06-22 NOTE — OUTREACH NOTE
Call Center TCM Note      Responses   [facility] [place] [facility]   Does the patient have one of the following disease processes/diagnoses(primary or secondary)?  Total Joint Replacement   Joint surgery performed?  Knee   TCM attempt successful?  No   Unsuccessful attempts  Attempt 1          Arlen Almonte RN    6/22/2020, 12:22

## 2020-06-22 NOTE — PROGRESS NOTES
Case Management Discharge Note      Final Note: home with [facility].    Provided Post Acute Provider List?: Yes  Post Acute Provider List: Home Health  Provided Post Acute Provider Quality & Resource List?: Refused  Delivered To: Patient  Method of Delivery: Telephone       Home Medical Care      Service Provider Request Status Selected Services Address Phone Number [facility]ax Number    [facility] [address] [phone]                Final Discharge Disposition Code: 06 - home with home health care

## 2020-06-25 ENCOUNTER — TRANSCRIBE ORDERS (OUTPATIENT)
Dept: ADMINISTRATIVE | Facility: HOSPITAL | Age: 60
End: 2020-06-25

## 2020-06-25 ENCOUNTER — APPOINTMENT (OUTPATIENT)
Dept: CARDIOLOGY | Facility: HOSPITAL | Age: 60
End: 2020-06-25

## 2020-06-25 ENCOUNTER — NURSE TRIAGE (OUTPATIENT)
Dept: CALL CENTER | Facility: HOSPITAL | Age: 60
End: 2020-06-25

## 2020-06-25 ENCOUNTER — HOSPITAL ENCOUNTER (OUTPATIENT)
Facility: HOSPITAL | Age: 60
Setting detail: OBSERVATION
Discharge: HOME | End: 2020-06-26
Attending: HOSPITALIST | Admitting: HOSPITALIST

## 2020-06-25 DIAGNOSIS — M79.662 PAIN OF LEFT CALF: Primary | ICD-10-CM

## 2020-06-25 DIAGNOSIS — M79.605 LEG PAIN, DIFFUSE, LEFT: ICD-10-CM

## 2020-06-25 DIAGNOSIS — I82.452 ACUTE DEEP VEIN THROMBOSIS (DVT) OF LEFT PERONEAL VEIN (HCC): Primary | ICD-10-CM

## 2020-06-25 PROBLEM — I82.409 DVT OF LEG (DEEP VENOUS THROMBOSIS): Status: ACTIVE | Noted: 2020-06-25

## 2020-06-25 LAB
BH CV LOW VAS LEFT PERONEAL VESSEL: 1
BH CV LOWER VASCULAR LEFT COMMON FEMORAL AUGMENT: NORMAL
BH CV LOWER VASCULAR LEFT COMMON FEMORAL COMPETENT: NORMAL
BH CV LOWER VASCULAR LEFT COMMON FEMORAL COMPRESS: NORMAL
BH CV LOWER VASCULAR LEFT COMMON FEMORAL PHASIC: NORMAL
BH CV LOWER VASCULAR LEFT COMMON FEMORAL SPONT: NORMAL
BH CV LOWER VASCULAR LEFT DISTAL FEMORAL COMPRESS: NORMAL
BH CV LOWER VASCULAR LEFT GASTRONEMIUS COMPRESS: NORMAL
BH CV LOWER VASCULAR LEFT GREATER SAPH AK COMPRESS: NORMAL
BH CV LOWER VASCULAR LEFT GREATER SAPH BK COMPRESS: NORMAL
BH CV LOWER VASCULAR LEFT LESSER SAPH COMPRESS: NORMAL
BH CV LOWER VASCULAR LEFT MID FEMORAL AUGMENT: NORMAL
BH CV LOWER VASCULAR LEFT MID FEMORAL COMPETENT: NORMAL
BH CV LOWER VASCULAR LEFT MID FEMORAL COMPRESS: NORMAL
BH CV LOWER VASCULAR LEFT MID FEMORAL PHASIC: NORMAL
BH CV LOWER VASCULAR LEFT MID FEMORAL SPONT: NORMAL
BH CV LOWER VASCULAR LEFT PERONEAL COMPRESS: NORMAL
BH CV LOWER VASCULAR LEFT PERONEAL THROMBUS: NORMAL
BH CV LOWER VASCULAR LEFT POPLITEAL AUGMENT: NORMAL
BH CV LOWER VASCULAR LEFT POPLITEAL COMPETENT: NORMAL
BH CV LOWER VASCULAR LEFT POPLITEAL COMPRESS: NORMAL
BH CV LOWER VASCULAR LEFT POPLITEAL PHASIC: NORMAL
BH CV LOWER VASCULAR LEFT POPLITEAL SPONT: NORMAL
BH CV LOWER VASCULAR LEFT POSTERIOR TIBIAL COMPRESS: NORMAL
BH CV LOWER VASCULAR LEFT PROXIMAL FEMORAL COMPRESS: NORMAL
BH CV LOWER VASCULAR LEFT SAPHENOFEMORAL JUNCTION AUGMENT: NORMAL
BH CV LOWER VASCULAR LEFT SAPHENOFEMORAL JUNCTION COMPETENT: NORMAL
BH CV LOWER VASCULAR LEFT SAPHENOFEMORAL JUNCTION COMPRESS: NORMAL
BH CV LOWER VASCULAR LEFT SAPHENOFEMORAL JUNCTION PHASIC: NORMAL
BH CV LOWER VASCULAR LEFT SAPHENOFEMORAL JUNCTION SPONT: NORMAL
BH CV LOWER VASCULAR RIGHT COMMON FEMORAL AUGMENT: NORMAL
BH CV LOWER VASCULAR RIGHT COMMON FEMORAL COMPETENT: NORMAL
BH CV LOWER VASCULAR RIGHT COMMON FEMORAL COMPRESS: NORMAL
BH CV LOWER VASCULAR RIGHT COMMON FEMORAL PHASIC: NORMAL
BH CV LOWER VASCULAR RIGHT COMMON FEMORAL SPONT: NORMAL

## 2020-06-25 PROCEDURE — G0378 HOSPITAL OBSERVATION PER HR: HCPCS

## 2020-06-25 PROCEDURE — 93971 EXTREMITY STUDY: CPT

## 2020-06-25 PROCEDURE — 99219 PR INITIAL OBSERVATION CARE/DAY 50 MINUTES: CPT | Performed by: NURSE PRACTITIONER

## 2020-06-25 PROCEDURE — 99284 EMERGENCY DEPT VISIT MOD MDM: CPT

## 2020-06-25 PROCEDURE — 96372 THER/PROPH/DIAG INJ SC/IM: CPT

## 2020-06-25 PROCEDURE — 25010000002 ENOXAPARIN PER 10 MG: Performed by: NURSE PRACTITIONER

## 2020-06-25 RX ORDER — ONDANSETRON 4 MG/1
4 TABLET, FILM COATED ORAL EVERY 6 HOURS PRN
Status: DISCONTINUED | OUTPATIENT
Start: 2020-06-25 | End: 2020-06-26 | Stop reason: HOSPADM

## 2020-06-25 RX ORDER — OXYCODONE HYDROCHLORIDE 5 MG/1
5 TABLET ORAL EVERY 4 HOURS PRN
Status: DISCONTINUED | OUTPATIENT
Start: 2020-06-25 | End: 2020-06-26 | Stop reason: HOSPADM

## 2020-06-25 RX ORDER — MECLIZINE HYDROCHLORIDE 25 MG/1
25 TABLET ORAL 3 TIMES DAILY PRN
Status: DISCONTINUED | OUTPATIENT
Start: 2020-06-25 | End: 2020-06-26 | Stop reason: HOSPADM

## 2020-06-25 RX ORDER — CHOLECALCIFEROL (VITAMIN D3) 125 MCG
5 CAPSULE ORAL NIGHTLY PRN
Status: DISCONTINUED | OUTPATIENT
Start: 2020-06-25 | End: 2020-06-26 | Stop reason: HOSPADM

## 2020-06-25 RX ORDER — ONDANSETRON 2 MG/ML
4 INJECTION INTRAMUSCULAR; INTRAVENOUS EVERY 6 HOURS PRN
Status: DISCONTINUED | OUTPATIENT
Start: 2020-06-25 | End: 2020-06-26 | Stop reason: HOSPADM

## 2020-06-25 RX ORDER — AMLODIPINE BESYLATE 5 MG/1
5 TABLET ORAL NIGHTLY
Status: DISCONTINUED | OUTPATIENT
Start: 2020-06-26 | End: 2020-06-26 | Stop reason: HOSPADM

## 2020-06-25 RX ORDER — ALUMINA, MAGNESIA, AND SIMETHICONE 2400; 2400; 240 MG/30ML; MG/30ML; MG/30ML
15 SUSPENSION ORAL EVERY 6 HOURS PRN
Status: DISCONTINUED | OUTPATIENT
Start: 2020-06-25 | End: 2020-06-26 | Stop reason: HOSPADM

## 2020-06-25 RX ORDER — SODIUM CHLORIDE 0.9 % (FLUSH) 0.9 %
10 SYRINGE (ML) INJECTION EVERY 12 HOURS SCHEDULED
Status: DISCONTINUED | OUTPATIENT
Start: 2020-06-26 | End: 2020-06-26 | Stop reason: HOSPADM

## 2020-06-25 RX ORDER — METOPROLOL TARTRATE 50 MG/1
50 TABLET, FILM COATED ORAL 2 TIMES DAILY
Status: DISCONTINUED | OUTPATIENT
Start: 2020-06-26 | End: 2020-06-26 | Stop reason: HOSPADM

## 2020-06-25 RX ORDER — PANTOPRAZOLE SODIUM 40 MG/1
40 TABLET, DELAYED RELEASE ORAL EVERY MORNING
Status: DISCONTINUED | OUTPATIENT
Start: 2020-06-26 | End: 2020-06-26 | Stop reason: HOSPADM

## 2020-06-25 RX ORDER — ACETAMINOPHEN 160 MG/5ML
650 SOLUTION ORAL EVERY 4 HOURS PRN
Status: DISCONTINUED | OUTPATIENT
Start: 2020-06-25 | End: 2020-06-26 | Stop reason: HOSPADM

## 2020-06-25 RX ORDER — BISACODYL 5 MG/1
5 TABLET, DELAYED RELEASE ORAL DAILY PRN
Status: DISCONTINUED | OUTPATIENT
Start: 2020-06-25 | End: 2020-06-26 | Stop reason: HOSPADM

## 2020-06-25 RX ORDER — ASPIRIN 325 MG
325 TABLET ORAL NIGHTLY
Status: DISCONTINUED | OUTPATIENT
Start: 2020-06-26 | End: 2020-06-26 | Stop reason: HOSPADM

## 2020-06-25 RX ORDER — ACETAMINOPHEN 325 MG/1
650 TABLET ORAL EVERY 4 HOURS PRN
Status: DISCONTINUED | OUTPATIENT
Start: 2020-06-25 | End: 2020-06-26 | Stop reason: HOSPADM

## 2020-06-25 RX ORDER — AMOXICILLIN 250 MG
1 CAPSULE ORAL 2 TIMES DAILY
Status: DISCONTINUED | OUTPATIENT
Start: 2020-06-26 | End: 2020-06-26 | Stop reason: HOSPADM

## 2020-06-25 RX ORDER — AMOXICILLIN 250 MG
1 CAPSULE ORAL 2 TIMES DAILY
COMMUNITY
End: 2021-06-28

## 2020-06-25 RX ORDER — ACETAMINOPHEN 650 MG/1
650 SUPPOSITORY RECTAL EVERY 4 HOURS PRN
Status: DISCONTINUED | OUTPATIENT
Start: 2020-06-25 | End: 2020-06-26 | Stop reason: HOSPADM

## 2020-06-25 RX ORDER — SODIUM CHLORIDE 0.9 % (FLUSH) 0.9 %
10 SYRINGE (ML) INJECTION AS NEEDED
Status: DISCONTINUED | OUTPATIENT
Start: 2020-06-25 | End: 2020-06-26 | Stop reason: HOSPADM

## 2020-06-25 RX ADMIN — ENOXAPARIN SODIUM 130 MG: 150 INJECTION SUBCUTANEOUS at 20:14

## 2020-06-25 RX ADMIN — OXYCODONE HYDROCHLORIDE 5 MG: 5 TABLET ORAL at 21:52

## 2020-06-25 NOTE — TELEPHONE ENCOUNTER
"LTK  6/19 andno having calf pain and tenderness willl be calling provider. Rated 7/10 this morning and now too bad this afternoon , has had a pain pill    Reason for Disposition  • Sounds like a serious complication to the triager    Additional Information  • Negative: Sounds like a life-threatening emergency to the triager  • Negative: Chest pain  • Negative: Difficulty breathing  • Negative: Acting confused (e.g., disoriented, slurred speech) or excessively sleepy  • Negative: Surgical incision symptoms and questions  • Negative: [1] Discomfort (pain, burning or stinging) when passing urine AND [2] male  • Negative: [1] Discomfort (pain, burning or stinging) when passing urine AND [2] female  • Negative: Constipation  • Negative: New or worsening leg (calf, thigh) pain  • Negative: New or worsening leg swelling  • Negative: Dizziness is severe, or persists > 24 hours after surgery  • Negative: Pain, redness, swelling, or pus at IV Site  • Negative: Symptoms arising from use of a urinary catheter (Major or Coude)  • Negative: Cast problems or questions  • Negative: Medication question  • Negative: [1] Widespread rash AND [2] bright red, sunburn-like  • Negative: [1] SEVERE headache AND [2] after spinal (epidural) anesthesia  • Negative: [1] Vomiting AND [2] persists > 4 hours  • Negative: [1] Vomiting AND [2] abdomen looks much more swollen than usual  • Negative: [1] Drinking very little AND [2] dehydration suspected (e.g., no urine > 12 hours, very dry mouth, very lightheaded)  • Negative: Patient sounds very sick or weak to the triager    Answer Assessment - Initial Assessment Questions  1. SYMPTOM: \"What's the main symptom you're concerned about?\" (e.g., pain, fever, vomiting)      Calf pain   2. ONSET: \"When did today  start?\"      today  3. SURGERY: \"What surgery was performed?\"      Total knee  4. DATE of SURGERY: \"When was surgery performed?\"       6/20  5. ANESTHESIA: \" What type of anesthesia did you " "have?\" (e.g., general, spinal, epidural, local)      general  6. PAIN: \"Is there any pain?\" If so, ask: \"How bad is it?\"  (Scale 1-10; or mild, moderate, severe)      Yes calf  7. FEVER: \"Do you have a fever?\" If so, ask: \"What is your temperature, how was it measured, and when did it start?\"  no  8. VOMITING: \"Is there any vomiting?\" If yes, ask: \"How many times?\"      no  9. BLEEDING: \"Is there any bleeding?\" If so, ask: \"How much?\" and \"Where?\"      no  10. OTHER SYMPTOMS: \"Do you have any other symptoms?\" (e.g., drainage from wound, painful urination, constipation)       none    Protocols used: POST-OP SYMPTOMS AND QUESTIONS-ADULT-AH      "

## 2020-06-26 ENCOUNTER — APPOINTMENT (OUTPATIENT)
Dept: CARDIOLOGY | Facility: HOSPITAL | Age: 60
End: 2020-06-26

## 2020-06-26 ENCOUNTER — READMISSION MANAGEMENT (OUTPATIENT)
Dept: CALL CENTER | Facility: HOSPITAL | Age: 60
End: 2020-06-26

## 2020-06-26 VITALS
HEIGHT: 72 IN | SYSTOLIC BLOOD PRESSURE: 101 MMHG | DIASTOLIC BLOOD PRESSURE: 65 MMHG | HEART RATE: 89 BPM | TEMPERATURE: 98.2 F | BODY MASS INDEX: 39.12 KG/M2 | RESPIRATION RATE: 18 BRPM | OXYGEN SATURATION: 97 % | WEIGHT: 288.8 LBS

## 2020-06-26 LAB
ANION GAP SERPL CALCULATED.3IONS-SCNC: 14 MMOL/L (ref 5–15)
BASOPHILS # BLD AUTO: 0.1 10*3/MM3 (ref 0–0.2)
BASOPHILS NFR BLD AUTO: 0.9 % (ref 0–1.5)
BUN BLD-MCNC: 14 MG/DL (ref 6–20)
BUN BLD-MCNC: ABNORMAL MG/DL
BUN/CREAT SERPL: ABNORMAL
CALCIUM SPEC-SCNC: 8.9 MG/DL (ref 8.6–10.5)
CHLORIDE SERPL-SCNC: 103 MMOL/L (ref 98–107)
CO2 SERPL-SCNC: 20 MMOL/L (ref 22–29)
CREAT BLD-MCNC: 0.84 MG/DL (ref 0.76–1.27)
DEPRECATED RDW RBC AUTO: 40.3 FL (ref 37–54)
EOSINOPHIL # BLD AUTO: 0.3 10*3/MM3 (ref 0–0.4)
EOSINOPHIL NFR BLD AUTO: 4.3 % (ref 0.3–6.2)
ERYTHROCYTE [DISTWIDTH] IN BLOOD BY AUTOMATED COUNT: 13 % (ref 12.3–15.4)
GFR SERPL CREATININE-BSD FRML MDRD: 94 ML/MIN/1.73
GLUCOSE BLD-MCNC: 122 MG/DL (ref 65–99)
HCT VFR BLD AUTO: 32.1 % (ref 37.5–51)
HGB BLD-MCNC: 11.5 G/DL (ref 13–17.7)
LYMPHOCYTES # BLD AUTO: 2 10*3/MM3 (ref 0.7–3.1)
LYMPHOCYTES NFR BLD AUTO: 32.8 % (ref 19.6–45.3)
MCH RBC QN AUTO: 31.4 PG (ref 26.6–33)
MCHC RBC AUTO-ENTMCNC: 35.7 G/DL (ref 31.5–35.7)
MCV RBC AUTO: 87.9 FL (ref 79–97)
MONOCYTES # BLD AUTO: 0.6 10*3/MM3 (ref 0.1–0.9)
MONOCYTES NFR BLD AUTO: 10.7 % (ref 5–12)
NEUTROPHILS # BLD AUTO: 3.1 10*3/MM3 (ref 1.7–7)
NEUTROPHILS NFR BLD AUTO: 51.3 % (ref 42.7–76)
NRBC BLD AUTO-RTO: 0.1 /100 WBC (ref 0–0.2)
PLATELET # BLD AUTO: 250 10*3/MM3 (ref 140–450)
PMV BLD AUTO: 8.9 FL (ref 6–12)
POTASSIUM BLD-SCNC: 4 MMOL/L (ref 3.5–5.2)
RBC # BLD AUTO: 3.65 10*6/MM3 (ref 4.14–5.8)
SODIUM BLD-SCNC: 137 MMOL/L (ref 136–145)
WBC NRBC COR # BLD: 6 10*3/MM3 (ref 3.4–10.8)

## 2020-06-26 PROCEDURE — G0378 HOSPITAL OBSERVATION PER HR: HCPCS

## 2020-06-26 PROCEDURE — 85025 COMPLETE CBC W/AUTO DIFF WBC: CPT | Performed by: NURSE PRACTITIONER

## 2020-06-26 PROCEDURE — 99217 PR OBSERVATION CARE DISCHARGE MANAGEMENT: CPT | Performed by: HOSPITALIST

## 2020-06-26 PROCEDURE — 96372 THER/PROPH/DIAG INJ SC/IM: CPT

## 2020-06-26 PROCEDURE — 25010000002 ENOXAPARIN PER 10 MG: Performed by: NURSE PRACTITIONER

## 2020-06-26 PROCEDURE — 80048 BASIC METABOLIC PNL TOTAL CA: CPT | Performed by: NURSE PRACTITIONER

## 2020-06-26 RX ADMIN — SENNOSIDES AND DOCUSATE SODIUM 1 TABLET: 8.6; 5 TABLET ORAL at 00:05

## 2020-06-26 RX ADMIN — ASPIRIN 325 MG ORAL TABLET 325 MG: 325 PILL ORAL at 00:05

## 2020-06-26 RX ADMIN — METOPROLOL TARTRATE 50 MG: 50 TABLET, FILM COATED ORAL at 00:05

## 2020-06-26 RX ADMIN — MELATONIN TAB 5 MG 5 MG: 5 TAB at 02:21

## 2020-06-26 RX ADMIN — ENOXAPARIN SODIUM 130 MG: 150 INJECTION SUBCUTANEOUS at 08:39

## 2020-06-26 RX ADMIN — PANTOPRAZOLE SODIUM 40 MG: 40 TABLET, DELAYED RELEASE ORAL at 06:18

## 2020-06-26 RX ADMIN — AMLODIPINE BESYLATE 5 MG: 5 TABLET ORAL at 00:04

## 2020-06-26 RX ADMIN — Medication 10 ML: at 09:00

## 2020-06-26 RX ADMIN — OXYCODONE HYDROCHLORIDE 5 MG: 5 TABLET ORAL at 02:26

## 2020-06-26 RX ADMIN — Medication 10 ML: at 00:05

## 2020-06-26 RX ADMIN — OXYCODONE HYDROCHLORIDE 5 MG: 5 TABLET ORAL at 10:36

## 2020-06-26 RX ADMIN — OXYCODONE HYDROCHLORIDE 5 MG: 5 TABLET ORAL at 06:18

## 2020-06-26 NOTE — OUTREACH NOTE
Prep Survey      Responses   [facility] facility patient discharged from?  [facility]   Is LACE score < 7 ?  Yes   Eligibility  [facility]   Hospital  [facility]   Date of Admission  06/25/20   Date of Discharge  06/26/20   Discharge Disposition  Home-Health Care Svc   Discharge diagnosis  DVT RLE following recent total knee arthroplasty   COVID-19 Test Status  Negative   Does the patient have one of the following disease processes/diagnoses(primary or secondary)?  Other   Does the patient have Home health ordered?  Yes   What is the Home health agency?   [facility]   Is there a DME ordered?  No   Prep survey completed?  Yes          Kim Salamanca RN

## 2020-06-29 ENCOUNTER — TRANSITIONAL CARE MANAGEMENT TELEPHONE ENCOUNTER (OUTPATIENT)
Dept: CALL CENTER | Facility: HOSPITAL | Age: 60
End: 2020-06-29

## 2020-06-29 NOTE — OUTREACH NOTE
Call Center TCM Note      Responses   [facility], [place] patient discharged from?  [facility]   COVID-19 Test Status  Negative   Does the patient have one of the following disease processes/diagnoses(primary or secondary)?  Other   TCM attempt successful?  Yes   Call start time  1015   Call end time  1026   Discharge diagnosis  DVT RLE following recent total knee arthroplasty   Is patient permission given to speak with other caregiver?  Yes   Person spoke with today (if not patient) and relationship  Ling/ sister   Meds reviewed with patient/caregiver?  Yes   Is the patient having any side effects they believe may be caused by any medication additions or changes?  No   Does the patient have all medications ordered at discharge?  Yes   Is the patient taking all medications as directed (includes completed medication regime)?  Yes   Does the patient have a primary care provider?   Yes   Does the patient have an appointment with their PCP within 7 days of discharge?  No   Comments regarding PCP  PCP:  Mike Redd MD- does not have an appt. Encouraged sister to have patient call for a followup appt.    What is preventing the patient from scheduling follow up appointments within 7 days of discharge?  Haven't had time   Nursing Interventions  Educated patient on importance of making appointment, Advised patient to make appointment   Has the patient kept scheduled appointments due by today?  N/A   What is the Home health agency?   [facility]   Has home health visited the patient within 72 hours of discharge?  Yes   Psychosocial issues?  No   Comments  Sister reports she spoke to him on Saturday and he was doing well. She will encouage los to make a followup appt.    Did the patient receive a copy of their discharge instructions?  Yes   Nursing interventions  Reviewed instructions with patient   What is the patient's perception of their health status since discharge?  Improving   Is the patient/caregiver able to teach  back signs and symptoms related to disease process for when to call PCP?  Yes   Is the patient/caregiver able to teach back signs and symptoms related to disease process for when to call 911?  Yes   Is the patient/caregiver able to teach back the hierarchy of who to call/visit for symptoms/problems? PCP, Specialist, Home health nurse, Urgent Care, ED, 911  Yes   TCM call completed?  Yes          Pio Maria RN    6/29/2020, 10:27

## 2020-09-11 RX ORDER — ROSUVASTATIN CALCIUM 5 MG/1
TABLET, COATED ORAL
Qty: 90 TABLET | Refills: 10 | Status: SHIPPED | OUTPATIENT
Start: 2020-09-11 | End: 2021-06-28

## 2020-11-10 ENCOUNTER — HOSPITAL ENCOUNTER (OUTPATIENT)
Dept: GENERAL RADIOLOGY | Facility: HOSPITAL | Age: 60
Discharge: HOME | End: 2020-11-10

## 2020-11-10 ENCOUNTER — TRANSCRIBE ORDERS (OUTPATIENT)
Dept: ADMINISTRATIVE | Facility: HOSPITAL | Age: 60
End: 2020-11-10

## 2020-11-10 DIAGNOSIS — Z02.71 DISABILITY EXAMINATION: Primary | ICD-10-CM

## 2020-11-10 DIAGNOSIS — Z02.71 DISABILITY EXAMINATION: ICD-10-CM

## 2020-11-10 PROCEDURE — 73560 X-RAY EXAM OF KNEE 1 OR 2: CPT

## 2021-01-25 RX ORDER — AMLODIPINE BESYLATE 5 MG/1
TABLET ORAL
Qty: 90 TABLET | Refills: 0 | Status: SHIPPED | OUTPATIENT
Start: 2021-01-25 | End: 2021-04-22

## 2021-02-23 ENCOUNTER — TELEPHONE (OUTPATIENT)
Dept: CARDIOLOGY | Facility: CLINIC | Age: 61
End: 2021-02-23

## 2021-04-22 RX ORDER — AMLODIPINE BESYLATE 5 MG/1
TABLET ORAL
Qty: 30 TABLET | Refills: 0 | Status: SHIPPED | OUTPATIENT
Start: 2021-04-22 | End: 2021-05-24

## 2021-05-24 ENCOUNTER — TELEPHONE (OUTPATIENT)
Dept: CARDIOLOGY | Facility: CLINIC | Age: 61
End: 2021-05-24

## 2021-05-24 RX ORDER — AMLODIPINE BESYLATE 5 MG/1
TABLET ORAL
Qty: 30 TABLET | Refills: 0 | Status: SHIPPED | OUTPATIENT
Start: 2021-05-24 | End: 2021-06-02 | Stop reason: SDUPTHER

## 2021-05-24 NOTE — TELEPHONE ENCOUNTER
PATIENT LEFT MESSAGE ASKING IF FLOATING IS OK? HE KNOWS HE CAN NOT GET IN HOT TUB. I DO NOT SEE HE HAD ANY RECENT PROCEDURE AT [facility]?

## 2021-06-02 RX ORDER — AMLODIPINE BESYLATE 5 MG/1
5 TABLET ORAL DAILY
Qty: 30 TABLET | Refills: 0 | Status: SHIPPED | OUTPATIENT
Start: 2021-06-02 | End: 2021-06-29 | Stop reason: SDUPTHER

## 2021-06-28 ENCOUNTER — OFFICE VISIT (OUTPATIENT)
Dept: CARDIOLOGY | Facility: CLINIC | Age: 61
End: 2021-06-28

## 2021-06-28 VITALS
HEIGHT: 72 IN | SYSTOLIC BLOOD PRESSURE: 161 MMHG | OXYGEN SATURATION: 96 % | WEIGHT: 301 LBS | HEART RATE: 86 BPM | DIASTOLIC BLOOD PRESSURE: 112 MMHG | BODY MASS INDEX: 40.77 KG/M2

## 2021-06-28 DIAGNOSIS — G47.33 OBSTRUCTIVE SLEEP APNEA: ICD-10-CM

## 2021-06-28 DIAGNOSIS — E78.00 PURE HYPERCHOLESTEROLEMIA: ICD-10-CM

## 2021-06-28 DIAGNOSIS — I10 ESSENTIAL HYPERTENSION: ICD-10-CM

## 2021-06-28 DIAGNOSIS — I25.708 CORONARY ARTERY DISEASE OF BYPASS GRAFT OF NATIVE HEART WITH STABLE ANGINA PECTORIS (HCC): Primary | ICD-10-CM

## 2021-06-28 PROCEDURE — 99214 OFFICE O/P EST MOD 30 MIN: CPT | Performed by: INTERNAL MEDICINE

## 2021-06-28 NOTE — PROGRESS NOTES
"    Subjective:     Encounter Date:06/28/2021      Patient ID: Flaco Yan is a 61 y.o. male.    Chief Complaint:  History of Present Illness 61-year-old white male with history of coronary disease hypertension hyperlipidemia sleep apnea presents to my office for follow-up.  Patient is currently stable without any symptoms of chest pain or shortness of breath at rest on exertion.  No complaint of any PND orthopnea.  No palpitation dizziness syncope or swelling of the feet but is taking medicine regularly.  He does not smoke.  Exercise regularly follows a good diet.    The following portions of the patient's history were reviewed and updated as appropriate: allergies, current medications, past family history, past medical history, past social history, past surgical history and problem list.  Past Medical History:   Diagnosis Date   • Coronary artery disease    • GERD (gastroesophageal reflux disease)    • Hyperlipidemia    • Hypertension    • Obesity    • Sleep apnea     no machine     Past Surgical History:   Procedure Laterality Date   • CARDIAC CATHETERIZATION     • CARDIAC SURGERY     • CORONARY ARTERY BYPASS GRAFT     • CORONARY STENT PLACEMENT     • FINGER SURGERY     • TOTAL KNEE ARTHROPLASTY Left 6/19/2020    Procedure: LEFT TOTAL KNEE;  Surgeon: Fernie Peoples II, MD;  Location: [facility];  Service: Orthopedics;  Laterality: Left;     BP (!) 161/112 (BP Location: Left arm, Patient Position: Sitting)   Pulse 86   Ht 182.9 cm (72\")   Wt (!) 137 kg (301 lb)   SpO2 96%   BMI 40.82 kg/m²   Family History   Problem Relation Age of Onset   • Stroke Mother    • Heart disease Mother    • Heart disease Father    • Heart disease Sister        Current Outpatient Medications:   •  amLODIPine (NORVASC) 5 MG tablet, Take 1 tablet by mouth Daily., Disp: 30 tablet, Rfl: 0  •  aspirin 325 MG tablet, Take 325 mg by mouth Every Night. 6/12 @1400 Call out to Cecy at Dr Peoples office as to stopping ASA. " Message left for her to call with order., Disp: , Rfl:   •  esomeprazole (NEXIUM) 20 MG capsule, Take 20 mg by mouth Every Morning Before Breakfast., Disp: , Rfl:   •  metoprolol tartrate (LOPRESSOR) 50 MG tablet, TAKE ONE TABLET BY MOUTH TWICE A DAY, Disp: 180 tablet, Rfl: 5  No Known Allergies  Social History     Socioeconomic History   • Marital status: Single     Spouse name: Not on file   • Number of children: Not on file   • Years of education: Not on file   • Highest education level: Not on file   Tobacco Use   • Smoking status: Former Smoker     Quit date: 1999     Years since quittin.5   • Smokeless tobacco: Never Used   Substance and Sexual Activity   • Alcohol use: No   • Drug use: No   • Sexual activity: Defer     Review of Systems   Constitutional: Negative for fever and malaise/fatigue.   Cardiovascular: Negative for chest pain, dyspnea on exertion and palpitations.   Respiratory: Negative for cough and shortness of breath.    Skin: Negative for rash.   Gastrointestinal: Negative for abdominal pain, nausea and vomiting.   Neurological: Negative for focal weakness and headaches.   All other systems reviewed and are negative.             Objective:     Constitutional:       Appearance: Well-developed.   Eyes:      General: No scleral icterus.     Conjunctiva/sclera: Conjunctivae normal.   HENT:      Head: Normocephalic and atraumatic.   Neck:      Vascular: No carotid bruit or JVD.   Pulmonary:      Effort: Pulmonary effort is normal.      Breath sounds: Normal breath sounds. No wheezing. No rales.   Cardiovascular:      Normal rate. Regular rhythm.   Pulses:     Intact distal pulses.   Abdominal:      General: Bowel sounds are normal.      Palpations: Abdomen is soft.   Musculoskeletal:      Cervical back: Normal range of motion and neck supple. Skin:     General: Skin is warm and dry.      Findings: No rash.   Neurological:      Mental Status: Alert.       Procedures    Lab Review:          MDM  1.  Coronary disease  Patient initially had stent placement to the RCA but lately had coronary bypass surgery x3 vessels with a LIMA to LAD and saphenous graft to the marginal branch and RCA  Patient has normal function  2.  Hypertension  Patient blood pressure high here but he has whitecoat hypertension but is stable at home  3.  Hyperlipidemia  Patient is currently on statins and followed by the primary doctor  4 obstructive sleep apnea  Patient is sleep apnea but does not use a CPAP machine

## 2021-06-29 RX ORDER — AMLODIPINE BESYLATE 5 MG/1
TABLET ORAL
Qty: 90 TABLET | Refills: 3 | Status: SHIPPED | OUTPATIENT
Start: 2021-06-29 | End: 2021-11-02

## 2021-09-13 RX ORDER — METOPROLOL TARTRATE 50 MG/1
TABLET, FILM COATED ORAL
Qty: 180 TABLET | Refills: 5 | Status: SHIPPED | OUTPATIENT
Start: 2021-09-13 | End: 2021-11-02 | Stop reason: SDUPTHER

## 2021-11-02 ENCOUNTER — OFFICE VISIT (OUTPATIENT)
Dept: FAMILY MEDICINE CLINIC | Facility: CLINIC | Age: 61
End: 2021-11-02

## 2021-11-02 VITALS
TEMPERATURE: 98 F | HEIGHT: 72 IN | DIASTOLIC BLOOD PRESSURE: 110 MMHG | HEART RATE: 71 BPM | OXYGEN SATURATION: 98 % | RESPIRATION RATE: 20 BRPM | BODY MASS INDEX: 40.63 KG/M2 | WEIGHT: 300 LBS | SYSTOLIC BLOOD PRESSURE: 160 MMHG

## 2021-11-02 DIAGNOSIS — I10 ESSENTIAL HYPERTENSION: ICD-10-CM

## 2021-11-02 DIAGNOSIS — G47.33 OSA (OBSTRUCTIVE SLEEP APNEA): Primary | ICD-10-CM

## 2021-11-02 PROCEDURE — 99214 OFFICE O/P EST MOD 30 MIN: CPT | Performed by: NURSE PRACTITIONER

## 2021-11-02 RX ORDER — AMLODIPINE BESYLATE 10 MG/1
10 TABLET ORAL DAILY
Qty: 90 TABLET | Refills: 3 | Status: SHIPPED | OUTPATIENT
Start: 2021-11-02 | End: 2022-05-01

## 2021-11-02 NOTE — PROGRESS NOTES
"Chief Complaint  Sleep Apnea  Subjective        Flaco Yan presents to [facility]  Pt comes in today with c/o increased fatigue throughout the day.  He reports that he gets up a couple of times a night to use the bathroom but otherwise he does not get up. However, he does not feel rested most day upon awakening.   He feels very fatigued throughout the day; he states that he falls asleep at the drop of a hat during the day.  Pt states that he does not have any motivation and feels that all of his medications make him tired.  He reports excessive snoring.   He denies taking a sleeping aid.   Pt reports that he had a sleep study done many years ago and was diagnosed with sleep apnea.  However, pt reports that he does not like to wear a CPAP and refuses to wear one.   He is requesting another sleep study and is interested in [facility].    HTN - Pt is currently taking amlopdine 5mg and metoprolol 50mg BID. Pt reports that he taking them as prescribed but states that he has not had his medication yet this morning. He does check his blood pressure at home on occasion. They average 145/80's. He denies dizziness or HA. He does report occasional CP and rarely SOA. Pt sees Dr. Fuller.        Objective     Vital Signs:   BP (!) 160/110 (BP Location: Left arm, Patient Position: Sitting, Cuff Size: Adult)   Pulse 71   Temp 98 °F (36.7 °C) (Temporal)   Resp 20   Ht 182.9 cm (72\")   Wt 136 kg (300 lb)   SpO2 98%   BMI 40.69 kg/m²       BP Readings from Last 3 Encounters:   11/02/21 (!) 160/110   06/28/21 (!) 161/112   06/26/20 101/65       Wt Readings from Last 3 Encounters:   11/02/21 136 kg (300 lb)   06/28/21 (!) 137 kg (301 lb)   06/26/20 131 kg (288 lb 12.8 oz)     Physical Exam  Constitutional:       Appearance: He is obese. He is not ill-appearing.   Cardiovascular:      Rate and Rhythm: Normal rate and regular rhythm.      Pulses: Normal pulses.      Heart sounds: Normal heart " sounds.   Pulmonary:      Effort: Pulmonary effort is normal.      Breath sounds: Normal breath sounds.   Neurological:      Mental Status: He is alert and oriented to person, place, and time.   Psychiatric:         Behavior: Behavior is agitated.        Result Review :                 Assessment and Plan    Diagnoses and all orders for this visit:    1. LOUIE (obstructive sleep apnea) (Primary)  -     Ambulatory Referral to Sleep Medicine    2. Essential hypertension  Assessment & Plan:  Hypertension is worsening.  Dietary sodium restriction.  Weight loss.  Regular aerobic exercise.  Medication changes per orders.  Blood pressure will be reassessed at the next regular appointment.  Repeat /110. Will increase amlodipine to 10mg daily     Orders:  -     amLODIPine (NORVASC) 10 MG tablet; Take 1 tablet by mouth Daily for 180 days.  Dispense: 90 tablet; Refill: 3  referral to sleep specialist  Increase amlodipine to 10mg  During this office visit, we discussed the pertinent aspects of the visit and treatment recommendations. Pt verbalizes understanding. Follow up was discussed. Patient was given the opportunity to ask questions and discuss other concerns.         Follow Up   Return in about 2 months (around 1/2/2022) for Annual physical.  Patient was given instructions and counseling regarding his condition or for health maintenance advice. Please see specific information pulled into the AVS if appropriate.

## 2021-11-24 ENCOUNTER — TELEPHONE (OUTPATIENT)
Dept: FAMILY MEDICINE CLINIC | Facility: CLINIC | Age: 61
End: 2021-11-24

## 2021-11-24 DIAGNOSIS — R05.9 COUGH: Primary | ICD-10-CM

## 2021-11-24 RX ORDER — IBUPROFEN 800 MG/1
800 TABLET ORAL EVERY 6 HOURS PRN
Qty: 60 TABLET | Refills: 1 | Status: SHIPPED | OUTPATIENT
Start: 2021-11-24 | End: 2021-12-04

## 2021-11-24 NOTE — TELEPHONE ENCOUNTER
Caller: Flaco Yan    Relationship: Self    Best call back number: [phone]     What medication are you requesting: SOMETHING FOR COVID    What are your current symptoms: COUGH, HEADACHE, SORE SCRATCHY  THROAT    How long have you been experiencing symptoms: TESTED AT HOME FOR COVID 11/23/21 POSITIVE    Have you had these symptoms before:    [] Yes  [x] No    Have you been treated for these symptoms before:   [] Yes  [x] No    If a prescription is needed, what is your preferred pharmacy and phone number: [facility] [facility] [place] - [address] [facility] - [phone]  - [phone]      Additional notes:HAD BOTH PFIZER SHOTS. WOULD LIKE SOMETHING CALLED IN TO MAKE HIM FEEL BETTER, OR ANYTHING THAT WILL HELP WITH THE SYMPTOMS

## 2022-01-03 ENCOUNTER — OFFICE VISIT (OUTPATIENT)
Dept: SLEEP MEDICINE | Facility: CLINIC | Age: 62
End: 2022-01-03

## 2022-01-03 VITALS
BODY MASS INDEX: 40.63 KG/M2 | DIASTOLIC BLOOD PRESSURE: 91 MMHG | HEIGHT: 72 IN | WEIGHT: 300 LBS | SYSTOLIC BLOOD PRESSURE: 126 MMHG | HEART RATE: 84 BPM | OXYGEN SATURATION: 97 %

## 2022-01-03 DIAGNOSIS — G47.8 NON-RESTORATIVE SLEEP: ICD-10-CM

## 2022-01-03 DIAGNOSIS — G47.10 HYPERSOMNIA: ICD-10-CM

## 2022-01-03 DIAGNOSIS — E66.01 MORBID OBESITY: ICD-10-CM

## 2022-01-03 DIAGNOSIS — G47.33 OBSTRUCTIVE SLEEP APNEA: Primary | ICD-10-CM

## 2022-01-03 PROCEDURE — 99204 OFFICE O/P NEW MOD 45 MIN: CPT | Performed by: FAMILY MEDICINE

## 2022-01-03 PROCEDURE — G0463 HOSPITAL OUTPT CLINIC VISIT: HCPCS

## 2022-01-03 NOTE — PROGRESS NOTES
[facility] New Patient/Consultation       Reason for Consultation: anahi      Patient Care Team:  Mike Redd MD as PCP - General (Family Medicine)  Hubert Fuller MD as Consulting Physician (Cardiology)  Uche Moreira MD as Consulting Physician (Sleep Medicine)      History of present illness:  Thank you for asking me to see your patient.  The patient is a 61 y.o. male with hypertension history of myocardial infarction testosterone deficiency and obstructive sleep apnea presents today to establish care.  Per records patient had a sleep study several years ago was diagnosed with obstructive sleep apnea however is not able to tolerate CPAP machine.  Is interested in being reassessed for severity of sleep apnea consider inspire therapy in the future.  I reviewed a copy of notes from sleep clinic in 2008.  Sleep study and lab November 2008 showed overall AHI 11.9.  At this time patient weighed 226 pounds.  Today patient weighs 300 pounds.  Pap titration was done in December 2008.  Recommended pressure of 8 cm H2O C-Flex mode of 3.    Sleep Schedule:  Bed time: 11 PM  Sleep latency: Minutes  Wake time: 6:30 AM  Average hours slept: 5-6  Non-restorative sleep: Yes  Number of naps per day: 0  Rotating shifts?:  No  Nocturia: Up to 1 time a night  Electronics in bedroom: No    Excessive daytime sleepiness or drowsiness:N  Any accidents at work due to sleepiness in the last 5 years:N  Any difficulty driving due to sleepiness or being drowsy: N  Weight changed in the last 5 years:Y -gained 40 pounds    Snoring:Y  Witnessed apneas:Y  Have you ever awakened gasping for breath, coughing, choking or respiratory discomfort: N  Morning headaches: N  Awaken with a sore throat or dry mouth: Y    Any reports of leg jerking at night: N  Urge sensations: N  Does pain disrupt sleep: Y  Sweating during sleep: N  Teeth grinding: Y    Any sudden episodes of sleep during the day: N  Sleep paralysis/hallucinations:  "N  Muscle weakness with laughing/anger: N  Nightmares: N  Sleep walking: N    Are you sleepy when you increase your sleep time: N  Do you sleep better away from your own bed: N    ESS: 15    Social History: No tobacco use no alcohol use no drug use 2-3 coffees a day    Review of Systems:    A complete review of systems was done and all were negative with the exception of nasal congestion joint pain frequent heartburn swollen glands    Allergies:  Patient has no known allergies.    Family History: LOUIE no       Current Outpatient Medications:   •  amLODIPine (NORVASC) 10 MG tablet, Take 1 tablet by mouth Daily for 180 days., Disp: 90 tablet, Rfl: 3  •  aspirin 325 MG tablet, Take 325 mg by mouth Every Night. 6/12 @1400 Call out to Cecy at Dr Peoples office as to stopping ASA. Message left for her to call with order., Disp: , Rfl:   •  esomeprazole (NEXIUM) 20 MG capsule, Take 20 mg by mouth Every Morning Before Breakfast., Disp: , Rfl:   •  metoprolol tartrate (LOPRESSOR) 50 MG tablet, Take 1 tablet by mouth 2 (Two) Times a Day for 30 days., Disp: 60 tablet, Rfl: 6    Vital Signs:    Vitals:    01/03/22 1535   BP: 126/91   BP Location: Left arm   Patient Position: Sitting   Cuff Size: Adult   Pulse: 84   SpO2: 97%   Weight: 136 kg (300 lb)   Height: 182.9 cm (72\")      Body mass index is 40.69 kg/m².         Physical Exam:   General Alert and oriented. No acute distress noted   Pharynx/Throat Class IV Mallampati airway, large tongue, no evidence of redundant lateral pharyngeal tissue. No oral lesions. No thrush. Moist mucous membranes.   Head Normocephalic. Symmetrical. Atraumatic.    Nose No septal deviation. No drainage   Chest Wall Normal shape. Symmetric expansion with respiration. No tenderness.   Neck Trachea midline, no thyromegaly or adenopathy    Lungs Clear to auscultation bilaterally. No wheezes. No rhonchi. No rales. Respirations regular, even and unlabored.   Heart Regular rhythm and normal rate. Normal " S1 and S2. No murmur   Abdomen Soft, non-tender and non-distended. Normal bowel sounds. No masses.   Extremities Moves all extremities well. No edema   Psychiatric Normal mood and affect.       Impression:  1. Obstructive sleep apnea    2. Hypersomnia    3. Non-restorative sleep    4. Morbid obesity (HCC)        Plan:    Good sleep hygiene measures should be maintained.  Weight loss would be beneficial in this patient who is morbidly obese BMI 40.7.    I discussed the pathophysiology of obstructive sleep apnea with the patient.  We discussed the adverse outcomes associated with untreated sleep-disordered breathing.  We discussed treatment modalities of obstructive sleep apnea including CPAP device as well as oral mandibular advancement device. Sleep study will be scheduled to establish definitive diagnosis of sleep disorder breathing.  Weight loss will be strongly beneficial in order to reduce the severity of sleep-disordered breathing.  Patient has narrow oropharyngeal structure.  Caution during activities that require prolonged concentration is strongly advised.  Patient will be notified of sleep study results after sleep study is completed.  If sleep apnea is only mild,  oral mandibular advancement device may be one of the treatment options.  However if sleep apnea is moderately severe, CPAP treatment will be strongly encouraged.     Patient very apprehensive about considering CPAP again as he was unable to tolerate before.  Does not have much luck with oral mandibular device in the past either.  Discussed if sleep apnea is moderate to severe can possibly consider inspire therapy however will need to get his BMI below 35 first.  Will reassess severity with home sleep study.    Thank you for allowing me to participate in your patient's care.    Uche Moreira MD  Sleep Medicine  01/03/22  15:54 EST

## 2022-01-19 ENCOUNTER — OFFICE VISIT (OUTPATIENT)
Dept: CARDIOLOGY | Facility: CLINIC | Age: 62
End: 2022-01-19

## 2022-01-19 VITALS
HEART RATE: 84 BPM | WEIGHT: 305 LBS | HEIGHT: 72 IN | DIASTOLIC BLOOD PRESSURE: 87 MMHG | SYSTOLIC BLOOD PRESSURE: 129 MMHG | OXYGEN SATURATION: 98 % | BODY MASS INDEX: 41.31 KG/M2

## 2022-01-19 DIAGNOSIS — I25.708 CORONARY ARTERY DISEASE OF BYPASS GRAFT OF NATIVE HEART WITH STABLE ANGINA PECTORIS: Primary | ICD-10-CM

## 2022-01-19 DIAGNOSIS — I10 ESSENTIAL HYPERTENSION: ICD-10-CM

## 2022-01-19 DIAGNOSIS — G47.33 OBSTRUCTIVE SLEEP APNEA: ICD-10-CM

## 2022-01-19 DIAGNOSIS — E78.00 PURE HYPERCHOLESTEROLEMIA: ICD-10-CM

## 2022-01-19 PROCEDURE — 99214 OFFICE O/P EST MOD 30 MIN: CPT | Performed by: INTERNAL MEDICINE

## 2022-01-19 NOTE — PROGRESS NOTES
"    Subjective:     Encounter Date:01/19/2022      Patient ID: Flaco Yan is a 61 y.o. male.    Chief Complaint:  History of Present Illness 61-year-old white male with history of coronary status post stent placement in the past and bypass surgery history of hypertension hyperlipidemia and sleep apnea presents to my office for follow-up. Patient is currently stable without any symptoms of chest pain or shortness of breath at rest or exertion. No complains any PND orthopnea. No palpitation dizziness syncope and he has some swelling of the feet but is taking his medicines regularly. He does not smoke. He is trying to exercise regularly follows a good diet.    The following portions of the patient's history were reviewed and updated as appropriate: allergies, current medications, past family history, past medical history, past social history, past surgical history and problem list.  Past Medical History:   Diagnosis Date   • Coronary artery disease    • GERD (gastroesophageal reflux disease)    • Hyperlipidemia    • Hypertension    • Obesity    • Sleep apnea     no machine     Past Surgical History:   Procedure Laterality Date   • CARDIAC CATHETERIZATION     • CARDIAC SURGERY     • CORONARY ARTERY BYPASS GRAFT     • CORONARY STENT PLACEMENT     • FINGER SURGERY     • TOTAL KNEE ARTHROPLASTY Left 6/19/2020    Procedure: LEFT TOTAL KNEE;  Surgeon: Fernie Peoples II, MD;  Location: [facility];  Service: Orthopedics;  Laterality: Left;     /87 (BP Location: Left arm, Patient Position: Sitting)   Pulse 84   Ht 182.9 cm (72\")   Wt (!) 138 kg (305 lb)   SpO2 98%   BMI 41.37 kg/m²   Family History   Problem Relation Age of Onset   • Stroke Mother    • Heart disease Mother    • Heart disease Father    • Heart disease Sister        Current Outpatient Medications:   •  amLODIPine (NORVASC) 10 MG tablet, Take 1 tablet by mouth Daily for 180 days., Disp: 90 tablet, Rfl: 3  •  aspirin 325 MG tablet, Take " 325 mg by mouth Every Night.  @1400 Call out to Cecy at Dr Peoples office as to stopping ASA. Message left for her to call with order., Disp: , Rfl:   •  esomeprazole (NEXIUM) 20 MG capsule, Take 20 mg by mouth Every Morning Before Breakfast., Disp: , Rfl:   •  metoprolol tartrate (LOPRESSOR) 50 MG tablet, Take 1 tablet by mouth 2 (Two) Times a Day for 30 days., Disp: 60 tablet, Rfl: 6  No Known Allergies  Social History     Socioeconomic History   • Marital status: Single   Tobacco Use   • Smoking status: Former Smoker     Quit date: 1999     Years since quittin.1   • Smokeless tobacco: Never Used   Substance and Sexual Activity   • Alcohol use: No   • Drug use: No   • Sexual activity: Defer     Review of Systems   Constitutional: Negative for fever and malaise/fatigue.   Cardiovascular: Negative for chest pain, dyspnea on exertion and palpitations.   Respiratory: Negative for cough and shortness of breath.    Skin: Negative for rash.   Gastrointestinal: Negative for abdominal pain, nausea and vomiting.   Neurological: Negative for focal weakness and headaches.   All other systems reviewed and are negative.             Objective:     Constitutional:       Appearance: Well-developed.   Eyes:      General: No scleral icterus.     Conjunctiva/sclera: Conjunctivae normal.   HENT:      Head: Normocephalic and atraumatic.   Neck:      Vascular: No carotid bruit or JVD.   Pulmonary:      Effort: Pulmonary effort is normal.      Breath sounds: Normal breath sounds. No wheezing. No rales.   Cardiovascular:      Normal rate. Regular rhythm.   Pulses:     Intact distal pulses.   Abdominal:      General: Bowel sounds are normal.      Palpations: Abdomen is soft.   Musculoskeletal:      Cervical back: Normal range of motion and neck supple. Skin:     General: Skin is warm and dry.      Findings: No rash.   Neurological:      Mental Status: Alert.       Procedures    Lab Review:         MDM  1. Coronary  disease  Patient had a stent placement the past followed by coronary bypass surgery x3 vessels with a LIMA to LAD and saphenous graft to the marginal branch and RCA  Patient has normal LV function  2. Hypertension  . Blood pressure currently stable on metoprolol and amlodipine  3. Hyperlipidemia  Patient lipid levels are followed by the primary care doctor is on atorvastatin  4. Obstructive sleep apnea  Patient has sleep apnea and uses a CPAP machine      Patient's previous medical records, labs, and EKG were reviewed and discussed with the patient at today's visit.

## 2022-02-17 ENCOUNTER — HOSPITAL ENCOUNTER (OUTPATIENT)
Dept: SLEEP MEDICINE | Facility: HOSPITAL | Age: 62
End: 2022-02-17

## 2022-04-28 ENCOUNTER — TELEPHONE (OUTPATIENT)
Dept: FAMILY MEDICINE CLINIC | Facility: CLINIC | Age: 62
End: 2022-04-28

## 2022-06-09 ENCOUNTER — OFFICE VISIT (OUTPATIENT)
Dept: FAMILY MEDICINE CLINIC | Facility: CLINIC | Age: 62
End: 2022-06-09

## 2022-06-09 ENCOUNTER — LAB (OUTPATIENT)
Dept: FAMILY MEDICINE CLINIC | Facility: CLINIC | Age: 62
End: 2022-06-09

## 2022-06-09 VITALS
DIASTOLIC BLOOD PRESSURE: 98 MMHG | OXYGEN SATURATION: 98 % | TEMPERATURE: 97.6 F | HEART RATE: 78 BPM | HEIGHT: 70 IN | RESPIRATION RATE: 16 BRPM | SYSTOLIC BLOOD PRESSURE: 142 MMHG | BODY MASS INDEX: 42.37 KG/M2 | WEIGHT: 296 LBS

## 2022-06-09 DIAGNOSIS — Z12.5 ENCOUNTER FOR SCREENING FOR MALIGNANT NEOPLASM OF PROSTATE: ICD-10-CM

## 2022-06-09 DIAGNOSIS — I10 PRIMARY HYPERTENSION: ICD-10-CM

## 2022-06-09 DIAGNOSIS — E78.01 FAMILIAL HYPERCHOLESTEROLEMIA: ICD-10-CM

## 2022-06-09 DIAGNOSIS — Z95.1 PRESENCE OF AORTOCORONARY BYPASS GRAFT: ICD-10-CM

## 2022-06-09 DIAGNOSIS — Z00.00 PREVENTATIVE HEALTH CARE: Primary | ICD-10-CM

## 2022-06-09 DIAGNOSIS — I25.708 CORONARY ARTERY DISEASE OF BYPASS GRAFT OF NATIVE HEART WITH STABLE ANGINA PECTORIS: ICD-10-CM

## 2022-06-09 DIAGNOSIS — R79.9 ABNORMAL FINDING OF BLOOD CHEMISTRY, UNSPECIFIED: ICD-10-CM

## 2022-06-09 DIAGNOSIS — E55.9 VITAMIN D DEFICIENCY, UNSPECIFIED: ICD-10-CM

## 2022-06-09 DIAGNOSIS — E66.01 MORBID (SEVERE) OBESITY DUE TO EXCESS CALORIES: ICD-10-CM

## 2022-06-09 DIAGNOSIS — Z91.81 AT RISK FOR FALLS: ICD-10-CM

## 2022-06-09 DIAGNOSIS — M17.0 PRIMARY OSTEOARTHRITIS OF BOTH KNEES: ICD-10-CM

## 2022-06-09 LAB
25(OH)D3 SERPL-MCNC: 27.1 NG/ML (ref 30–100)
ALBUMIN SERPL-MCNC: 4.6 G/DL (ref 3.5–5.2)
ALBUMIN/GLOB SERPL: 1.9 G/DL
ALP SERPL-CCNC: 73 U/L (ref 39–117)
ALT SERPL W P-5'-P-CCNC: 14 U/L (ref 1–41)
ANION GAP SERPL CALCULATED.3IONS-SCNC: 13.4 MMOL/L (ref 5–15)
AST SERPL-CCNC: 19 U/L (ref 1–40)
BASOPHILS # BLD AUTO: 0.06 10*3/MM3 (ref 0–0.2)
BASOPHILS NFR BLD AUTO: 1.1 % (ref 0–1.5)
BILIRUB SERPL-MCNC: 0.3 MG/DL (ref 0–1.2)
BILIRUB UR QL STRIP: NEGATIVE
BUN SERPL-MCNC: 12 MG/DL (ref 8–23)
BUN/CREAT SERPL: 13.8 (ref 7–25)
CALCIUM SPEC-SCNC: 9 MG/DL (ref 8.6–10.5)
CHLORIDE SERPL-SCNC: 104 MMOL/L (ref 98–107)
CHOLEST SERPL-MCNC: 214 MG/DL (ref 0–200)
CLARITY UR: CLEAR
CO2 SERPL-SCNC: 20.6 MMOL/L (ref 22–29)
COLOR UR: YELLOW
CREAT SERPL-MCNC: 0.87 MG/DL (ref 0.76–1.27)
DEPRECATED RDW RBC AUTO: 37.9 FL (ref 37–54)
EGFRCR SERPLBLD CKD-EPI 2021: 98.8 ML/MIN/1.73
EOSINOPHIL # BLD AUTO: 0.21 10*3/MM3 (ref 0–0.4)
EOSINOPHIL NFR BLD AUTO: 3.7 % (ref 0.3–6.2)
ERYTHROCYTE [DISTWIDTH] IN BLOOD BY AUTOMATED COUNT: 12.2 % (ref 12.3–15.4)
GLOBULIN UR ELPH-MCNC: 2.4 GM/DL
GLUCOSE SERPL-MCNC: 116 MG/DL (ref 65–99)
GLUCOSE UR STRIP-MCNC: NEGATIVE MG/DL
HBA1C MFR BLD: 5.9 % (ref 3.5–5.6)
HCT VFR BLD AUTO: 40.8 % (ref 37.5–51)
HDLC SERPL-MCNC: 34 MG/DL (ref 40–60)
HGB BLD-MCNC: 14 G/DL (ref 13–17.7)
HGB UR QL STRIP.AUTO: NEGATIVE
IMM GRANULOCYTES # BLD AUTO: 0.02 10*3/MM3 (ref 0–0.05)
IMM GRANULOCYTES NFR BLD AUTO: 0.4 % (ref 0–0.5)
KETONES UR QL STRIP: NEGATIVE
LDLC SERPL CALC-MCNC: 166 MG/DL (ref 0–100)
LDLC/HDLC SERPL: 4.83 {RATIO}
LEUKOCYTE ESTERASE UR QL STRIP.AUTO: NEGATIVE
LYMPHOCYTES # BLD AUTO: 2.06 10*3/MM3 (ref 0.7–3.1)
LYMPHOCYTES NFR BLD AUTO: 36.7 % (ref 19.6–45.3)
MCH RBC QN AUTO: 29.8 PG (ref 26.6–33)
MCHC RBC AUTO-ENTMCNC: 34.3 G/DL (ref 31.5–35.7)
MCV RBC AUTO: 86.8 FL (ref 79–97)
MONOCYTES # BLD AUTO: 0.43 10*3/MM3 (ref 0.1–0.9)
MONOCYTES NFR BLD AUTO: 7.7 % (ref 5–12)
NEUTROPHILS NFR BLD AUTO: 2.84 10*3/MM3 (ref 1.7–7)
NEUTROPHILS NFR BLD AUTO: 50.4 % (ref 42.7–76)
NITRITE UR QL STRIP: NEGATIVE
NRBC BLD AUTO-RTO: 0 /100 WBC (ref 0–0.2)
PH UR STRIP.AUTO: 6 [PH] (ref 5–8)
PLATELET # BLD AUTO: 305 10*3/MM3 (ref 140–450)
PMV BLD AUTO: 11.1 FL (ref 6–12)
POTASSIUM SERPL-SCNC: 4.1 MMOL/L (ref 3.5–5.2)
PROT SERPL-MCNC: 7 G/DL (ref 6–8.5)
PROT UR QL STRIP: NEGATIVE
PSA SERPL-MCNC: 1.38 NG/ML (ref 0–4)
RBC # BLD AUTO: 4.7 10*6/MM3 (ref 4.14–5.8)
SODIUM SERPL-SCNC: 138 MMOL/L (ref 136–145)
SP GR UR STRIP: 1.01 (ref 1–1.03)
T4 FREE SERPL-MCNC: 1.04 NG/DL (ref 0.93–1.7)
TRIGL SERPL-MCNC: 79 MG/DL (ref 0–150)
TSH SERPL DL<=0.05 MIU/L-ACNC: 0.63 UIU/ML (ref 0.27–4.2)
UROBILINOGEN UR QL STRIP: NORMAL
VIT B12 BLD-MCNC: 369 PG/ML (ref 211–946)
VLDLC SERPL-MCNC: 14 MG/DL (ref 5–40)
WBC NRBC COR # BLD: 5.62 10*3/MM3 (ref 3.4–10.8)

## 2022-06-09 PROCEDURE — 85025 COMPLETE CBC W/AUTO DIFF WBC: CPT | Performed by: FAMILY MEDICINE

## 2022-06-09 PROCEDURE — 83036 HEMOGLOBIN GLYCOSYLATED A1C: CPT | Performed by: FAMILY MEDICINE

## 2022-06-09 PROCEDURE — 36415 COLL VENOUS BLD VENIPUNCTURE: CPT | Performed by: FAMILY MEDICINE

## 2022-06-09 PROCEDURE — 80053 COMPREHEN METABOLIC PANEL: CPT | Performed by: FAMILY MEDICINE

## 2022-06-09 PROCEDURE — 84443 ASSAY THYROID STIM HORMONE: CPT | Performed by: FAMILY MEDICINE

## 2022-06-09 PROCEDURE — 82306 VITAMIN D 25 HYDROXY: CPT | Performed by: FAMILY MEDICINE

## 2022-06-09 PROCEDURE — 81003 URINALYSIS AUTO W/O SCOPE: CPT | Performed by: FAMILY MEDICINE

## 2022-06-09 PROCEDURE — 84439 ASSAY OF FREE THYROXINE: CPT | Performed by: FAMILY MEDICINE

## 2022-06-09 PROCEDURE — 80061 LIPID PANEL: CPT | Performed by: FAMILY MEDICINE

## 2022-06-09 PROCEDURE — 99396 PREV VISIT EST AGE 40-64: CPT | Performed by: FAMILY MEDICINE

## 2022-06-09 PROCEDURE — G0103 PSA SCREENING: HCPCS | Performed by: FAMILY MEDICINE

## 2022-06-09 PROCEDURE — 82607 VITAMIN B-12: CPT | Performed by: FAMILY MEDICINE

## 2022-06-09 RX ORDER — AMLODIPINE BESYLATE 10 MG/1
10 TABLET ORAL DAILY
Qty: 90 TABLET | Refills: 2 | Status: SHIPPED | OUTPATIENT
Start: 2022-06-09 | End: 2022-09-07

## 2022-06-09 NOTE — PROGRESS NOTES
Chief Complaint  Annual Exam    Subjective      Flaco Yan presents to [facility]  For annual exam. Her has questions about nexium.    The patient reports that he is doing well. He continues to take metoprolol 50 mg daily, Nexium 20 mg daily, amlodipine 10 mg daily, an allergy pill, and aspirin 325 mg daily. He states that he had a triple bypass in 2015. The patient was told to take a whole aspirin when he had the stents placed. He states that he has not altered his aspirin regimen. The patient states that he has been trying to sleep more, even if he is not sleeping well, which is helpful.    The patient states that he has been experiencing pain in his hands, elbows, shoulders, knees, and hips. He states that his sciatic has been acting up, but he thinks he is on the other side of that now. The patient states that he had vein clots in his leg 2 years ago. He was placed on medication to prevent them. He adds that he had left knee replacement surgery in 06/2020 by Dr. Peoples. The patient states that it took a while to get back to walking after his knee surgery. He adds that he is going to need a right knee replacement as well.    The patient reports that he has not had any gout attacks recently. He has not had any falls recently. The patient states that he tips over pretty regularly, but he does not fall all the way down. He forgets to put his foot out to balance. He adds that his appetite is weird. He states that he had a sandwich and ate half of it. The patient has had 2 COVID-19 vaccines. He states that he has a tooth that is occasionally painful and that causes his lymph node area to be painful. He has an appointment with the dentist on Monday, 06/13/2022. The patient reports that his last colonoscopy was a long time ago. The patient does not go to the gym. The patient states that he is usually fatigued at the end of the day when he does not do anything. He occasionally  "experiences pain in his LUQ floating rib tips. He adds that he does a lot of bending over. The patient states that everything he does is on the ground. He can not do what he is supposed to do anymore. The patient adds that he has no help.    The patient reports that he no longer wears his sleep mask at night. He states that he is claustrophobic. The patient states that he crawled under his truck the other day and he started \"freaking out.\" He adds that he has never done that before. The patient states that they tried to give him an MRI on his knee and he informed them that he was going to need an open MRI. The patient states that he tried to get information on the inspired pacemaker for his throat.    Objective   Vital Signs:  /98 (BP Location: Left arm)   Pulse 78   Temp 97.6 °F (36.4 °C) (Infrared)   Resp 16   Ht 177.8 cm (70\")   Wt 134 kg (296 lb)   SpO2 98%   BMI 42.47 kg/m²   Estimated body mass index is 42.47 kg/m² as calculated from the following:    Height as of this encounter: 177.8 cm (70\").    Weight as of this encounter: 134 kg (296 lb).    Class 3 Severe Obesity (BMI >=40). Obesity-related health conditions include the following: obstructive sleep apnea, hypertension, coronary heart disease, dyslipidemias, GERD and osteoarthritis. Obesity is improving with lifestyle modifications. BMI is is above average; BMI management plan is completed. We discussed low calorie, low carb based diet program, portion control, increasing exercise and Weight Watchers or other Commercial based weight reduction program.      Physical Exam  Constitutional:       Appearance: Normal appearance. He is well-developed. He is obese.   HENT:      Head: Normocephalic and atraumatic.      Right Ear: Tympanic membrane, ear canal and external ear normal.      Left Ear: Tympanic membrane, ear canal and external ear normal.      Nose: Nose normal.      Mouth/Throat:      Mouth: Mucous membranes are moist.      Pharynx: " Oropharynx is clear. No oropharyngeal exudate.   Eyes:      Extraocular Movements: Extraocular movements intact.      Conjunctiva/sclera: Conjunctivae normal.      Pupils: Pupils are equal, round, and reactive to light.   Cardiovascular:      Rate and Rhythm: Normal rate and regular rhythm.      Pulses: Normal pulses.      Heart sounds: Normal heart sounds.   Pulmonary:      Effort: Pulmonary effort is normal.      Breath sounds: Normal breath sounds.   Abdominal:      General: Bowel sounds are normal.      Palpations: Abdomen is soft.   Musculoskeletal:         General: Normal range of motion.      Cervical back: Normal range of motion and neck supple.   Skin:     General: Skin is warm and dry.   Neurological:      General: No focal deficit present.      Mental Status: He is alert and oriented to person, place, and time. Mental status is at baseline.   Psychiatric:         Mood and Affect: Mood normal.         Behavior: Behavior normal.         Thought Content: Thought content normal.         Judgment: Judgment normal.               Assessment and Plan    1. Preventative Care:  -- Flaco is a 62-year-old white male who came in today for a preventative care physical.  Flaco has dealt with several medical problems over the years including morbid obesity with a BMI of 42, coronary disease, arthritis, hypertension and hyperlipidemia.  Because of COVID he has not actually been in for a complete physical in a while.  Overall he states he is feeling reasonably good.  He has some arthritis that he deals with on a daily basis.  Otherwise he still working and he staying active.  Immunizations are up-to-date.  Colonoscopy is up-to-date.  Labs will be done today and reviewed sometime this week and adjustments will be made in his medications if needed.  I want him on a low-carb diet and exercising.  I suggested he look into weight watchers or some other type of plant that would give him some guidance on a day-to-day basis on how  to get this weight off.  I also discussed very quickly the bariatric surgery program that would be available for him    2. Primary osteoarthritis of the knees  - He is status post left knee replacement and probably needs his right knee replaced very soon. Again, weight loss would certainly help his arthritis in other joints as well as his knees.    3. At risk for falls  - The patient has had trouble with just occasionally losing balance and falling for no real good reason. He is not sure if he trips, he does not feel dizzy, he does not get lightheaded, and he certainly does not lose consciousness or pass out. He just simply, at times, will feel like he has lost his balance and then he has a tendency to stumble or even fall. We are going to watch this for a while. I am not sure that there is any work-up or evaluation we can do right now to assist him other than to say that he is going to be careful and if he gets too intense, he is going to need to use a cane.    4. Family hypercholesterolemia  - The patient has a long history of high cholesterol. We are going to recheck it today and see if he needs a statin.    5. Previous coronary artery bypass grafting  - The patient has no evidence of ischemic myocardium.    6. Hypertension  - The patient is under treatment for hypertension and will continue the metoprolol and amlodipine for now. His blood pressure today was 142/98 mmHg. We checked it a little later and he was down to about 140/85 mmHg.    7. Morbid obesity  - The patient's weight is much too high. He is 296 pounds and he needs to get his weight down. I did talk to him about bariatric surgery, and he seemed at least partially interested. He is going to look into it a little bit more about coverage. I think it would be a good choice for him as far as losing weight. The gastric sleeve would be the best option for him.    8. Previous abnormalities on blood chemistry  - Mainly random blood sugars have been elevated  at times. I am going to do an A1c today to make sure he is not prediabetic or diabetic.    9. Prostate cancer screening  - We are going to do a PSA today for prostate cancer screening. It has been a couple of years since he has had one, and we need to check that.     10.  Vitamin D deficiency.   - The patient will have his vitamin D level rechecked today and we will supplement if needed.    11. Coronary artery disease status post bypass grafting with no history of angina  - Flaco is a very active 62-year-old who carries too much weight but otherwise is in good health. He has not had any angina or angina-like symptoms for many years. He will continue with his current therapy with the goal of losing a significant amount of weight. We did discuss the possibility of bariatric surgery to assist him in this, and he will consider it.         Follow Up Return in about 1 year (around 6/9/2023), or if symptoms worsen or fail to improve, for Annual physical.  Patient was given instructions and counseling regarding his condition or for health maintenance advice. Please see specific information pulled into the AVS if appropriate.         Transcribed from ambient dictation for Mike Redd MD by Isabelle Rivera.  06/09/22   10:29 EDT    Patient verbalized consent to the visit recording.

## 2022-06-11 RX ORDER — ROSUVASTATIN CALCIUM 5 MG/1
5 TABLET, COATED ORAL NIGHTLY
Qty: 90 TABLET | Refills: 2 | Status: SHIPPED | OUTPATIENT
Start: 2022-06-11 | End: 2023-03-20

## 2022-06-11 RX ORDER — ERGOCALCIFEROL 1.25 MG/1
50000 CAPSULE ORAL
Qty: 12 CAPSULE | Refills: 1 | Status: SHIPPED | OUTPATIENT
Start: 2022-06-11 | End: 2022-08-28

## 2022-06-11 NOTE — PROGRESS NOTES
Your labs indicate your Vit D level is low.  We will call in capsules of the 50,000 units each and I would ask that you take one a week for the next 12 weeks.   After those 12 weeks are completed then  buy otc Vit D at 2000 units each  and take one daily.   Lets recheck the Vit D level in about 5-6 months..      Cholesterol is still too high.  Lets try low dose statin for a few mos and recheck chol level in 3 mos

## 2022-06-13 NOTE — PROGRESS NOTES
"Left vm with results for patient       FOR HUB TO SAY    \"Your labs indicate your Vit D level is low.  We will call in capsules of the 50,000 units each and I would ask that you take one a week for the next 12 weeks.   After those 12 weeks are completed then  buy otc Vit D at 2000 units each  and take one daily.   Lets recheck the Vit D level in about 5-6 months..       Cholesterol is still too high.  Lets try low dose statin for a few mos and recheck chol level in 3 mos \"  "

## 2022-06-19 PROBLEM — Z00.00 PREVENTATIVE HEALTH CARE: Status: ACTIVE | Noted: 2022-06-19

## 2022-08-25 ENCOUNTER — OFFICE VISIT (OUTPATIENT)
Dept: CARDIOLOGY | Facility: CLINIC | Age: 62
End: 2022-08-25

## 2022-08-25 VITALS
HEART RATE: 66 BPM | OXYGEN SATURATION: 94 % | SYSTOLIC BLOOD PRESSURE: 156 MMHG | DIASTOLIC BLOOD PRESSURE: 84 MMHG | WEIGHT: 297 LBS | HEIGHT: 70 IN | BODY MASS INDEX: 42.52 KG/M2

## 2022-08-25 DIAGNOSIS — G47.33 OBSTRUCTIVE SLEEP APNEA: ICD-10-CM

## 2022-08-25 DIAGNOSIS — E78.00 PURE HYPERCHOLESTEROLEMIA: ICD-10-CM

## 2022-08-25 DIAGNOSIS — I10 ESSENTIAL HYPERTENSION: ICD-10-CM

## 2022-08-25 DIAGNOSIS — I25.708 CORONARY ARTERY DISEASE OF BYPASS GRAFT OF NATIVE HEART WITH STABLE ANGINA PECTORIS: Primary | ICD-10-CM

## 2022-08-25 PROCEDURE — 99214 OFFICE O/P EST MOD 30 MIN: CPT | Performed by: INTERNAL MEDICINE

## 2022-08-25 NOTE — PROGRESS NOTES
"    Subjective:     Encounter Date:08/25/2022      Patient ID: Flaco Yan is a 62 y.o. male.    Chief Complaint:  History of Present Illness 62-year-old white male with history of coronary disease status post coronary bypass surgery history of hypertension hyperlipidemia sleep apnea presents to my office for follow-up.  Patient is currently stable without any symptoms of chest pain or shortness of breath at rest or exertion radiograms any PND orthopnea.  No palpitation dizziness syncope or swelling of the feet.  Patient has been taking all the medicines regularly.  Patient does not smoke.  Patient stated exercise regularly patient follows a good diet.    The following portions of the patient's history were reviewed and updated as appropriate: allergies, current medications, past family history, past medical history, past social history, past surgical history and problem list.  Past Medical History:   Diagnosis Date   • Coronary artery disease    • GERD (gastroesophageal reflux disease)    • Hyperlipidemia    • Hypertension    • Obesity      Past Surgical History:   Procedure Laterality Date   • CARDIAC CATHETERIZATION     • CARDIAC SURGERY     • CORONARY ARTERY BYPASS GRAFT     • CORONARY STENT PLACEMENT     • FINGER SURGERY     • TOTAL KNEE ARTHROPLASTY Left 6/19/2020    Procedure: LEFT TOTAL KNEE;  Surgeon: Fernie Peoples II, MD;  Location: [facility];  Service: Orthopedics;  Laterality: Left;     /84 (BP Location: Left arm, Patient Position: Sitting, Cuff Size: Large Adult)   Pulse 66   Ht 177.8 cm (70\")   Wt 135 kg (297 lb)   SpO2 94%   BMI 42.62 kg/m²   Family History   Problem Relation Age of Onset   • Stroke Mother    • Heart disease Mother    • Heart disease Father    • Heart disease Sister        Current Outpatient Medications:   •  amLODIPine (NORVASC) 10 MG tablet, Take 1 tablet by mouth Daily for 90 days., Disp: 90 tablet, Rfl: 2  •  aspirin 325 MG tablet, Take 325 mg by " mouth Every Night.  @1400 Call out to Cecy at Dr Peoples office as to stopping ASA. Message left for her to call with order., Disp: , Rfl:   •  esomeprazole (nexIUM) 20 MG capsule, Take 20 mg by mouth Every Morning Before Breakfast., Disp: , Rfl:   •  metoprolol tartrate (LOPRESSOR) 50 MG tablet, Take 1 tablet by mouth 2 (Two) Times a Day for 30 days., Disp: 60 tablet, Rfl: 6  •  rosuvastatin (CRESTOR) 5 MG tablet, Take 1 tablet by mouth Every Night for 90 days., Disp: 90 tablet, Rfl: 2  •  vitamin D (ERGOCALCIFEROL) 1.25 MG (30340 UT) capsule capsule, Take 1 capsule by mouth Every 7 (Seven) Days for 12 doses., Disp: 12 capsule, Rfl: 1  No Known Allergies  Social History     Socioeconomic History   • Marital status: Single   Tobacco Use   • Smoking status: Former Smoker     Quit date: 1999     Years since quittin.6   • Smokeless tobacco: Never Used   Vaping Use   • Vaping Use: Never used   Substance and Sexual Activity   • Alcohol use: No   • Drug use: No   • Sexual activity: Defer     Review of Systems   Constitutional: Negative for fever and malaise/fatigue.   Cardiovascular: Negative for chest pain, dyspnea on exertion and palpitations.   Respiratory: Negative for cough and shortness of breath.    Skin: Negative for rash.   Gastrointestinal: Negative for abdominal pain, nausea and vomiting.   Neurological: Negative for focal weakness and headaches.   All other systems reviewed and are negative.             Objective:     Constitutional:       Appearance: Well-developed.   Eyes:      General: No scleral icterus.     Conjunctiva/sclera: Conjunctivae normal.   HENT:      Head: Normocephalic and atraumatic.   Neck:      Vascular: No carotid bruit or JVD.   Pulmonary:      Effort: Pulmonary effort is normal.      Breath sounds: Normal breath sounds. No wheezing. No rales.   Cardiovascular:      Normal rate. Regular rhythm.   Pulses:     Intact distal pulses.   Abdominal:      General: Bowel sounds are  normal.      Palpations: Abdomen is soft.   Musculoskeletal:      Cervical back: Normal range of motion and neck supple. Skin:     General: Skin is warm and dry.      Findings: No rash.   Neurological:      Mental Status: Alert.       Procedures    Lab Review:         MDM  1.  Coronary disease  Patient had coronary bypass 3x3 vessels with a LIMA to LAD and saphenous graft to the marginal branch and RCA and has normal LV function is currently stable  2.  Hypertension  Patient blood pressure is currently stable on metoprolol and amlodipine although he has whitecoat hypertension  3.  Hyperlipidemia  Patient on Crestor and the lipids are followed by the primary care doctor's   #4 sleep apnea  Patient has sleep apnea and uses a CPAP machine.      Patient's previous medical records, labs, and EKG were reviewed and discussed with the patient at today's visit.

## 2022-12-13 RX ORDER — ERGOCALCIFEROL 1.25 MG/1
CAPSULE ORAL
Qty: 12 CAPSULE | Refills: 1 | OUTPATIENT
Start: 2022-12-13

## 2022-12-19 RX ORDER — METOPROLOL TARTRATE 50 MG/1
TABLET, FILM COATED ORAL
Qty: 180 TABLET | Refills: 0 | Status: SHIPPED | OUTPATIENT
Start: 2022-12-19 | End: 2023-03-20

## 2023-02-28 ENCOUNTER — OFFICE VISIT (OUTPATIENT)
Dept: CARDIOLOGY | Facility: CLINIC | Age: 63
End: 2023-02-28
Payer: MEDICARE

## 2023-02-28 VITALS
BODY MASS INDEX: 44.52 KG/M2 | SYSTOLIC BLOOD PRESSURE: 139 MMHG | HEIGHT: 70 IN | OXYGEN SATURATION: 97 % | WEIGHT: 311 LBS | DIASTOLIC BLOOD PRESSURE: 83 MMHG | HEART RATE: 86 BPM

## 2023-02-28 DIAGNOSIS — I25.708 CORONARY ARTERY DISEASE OF BYPASS GRAFT OF NATIVE HEART WITH STABLE ANGINA PECTORIS: Primary | ICD-10-CM

## 2023-02-28 DIAGNOSIS — I10 ESSENTIAL HYPERTENSION: ICD-10-CM

## 2023-02-28 DIAGNOSIS — G47.33 OBSTRUCTIVE SLEEP APNEA: ICD-10-CM

## 2023-02-28 DIAGNOSIS — E78.00 PURE HYPERCHOLESTEROLEMIA: ICD-10-CM

## 2023-02-28 PROCEDURE — 99214 OFFICE O/P EST MOD 30 MIN: CPT | Performed by: INTERNAL MEDICINE

## 2023-02-28 RX ORDER — AMLODIPINE BESYLATE 10 MG/1
10 TABLET ORAL DAILY
COMMUNITY
End: 2023-03-21

## 2023-03-20 RX ORDER — METOPROLOL TARTRATE 50 MG/1
TABLET, FILM COATED ORAL
Qty: 180 TABLET | Refills: 0 | Status: SHIPPED | OUTPATIENT
Start: 2023-03-20

## 2023-03-20 RX ORDER — ROSUVASTATIN CALCIUM 5 MG/1
TABLET, COATED ORAL
Qty: 90 TABLET | Refills: 2 | Status: SHIPPED | OUTPATIENT
Start: 2023-03-20

## 2023-03-21 RX ORDER — AMLODIPINE BESYLATE 10 MG/1
TABLET ORAL
Qty: 90 TABLET | Refills: 0 | Status: SHIPPED | OUTPATIENT
Start: 2023-03-21

## 2023-06-12 ENCOUNTER — LAB (OUTPATIENT)
Dept: FAMILY MEDICINE CLINIC | Facility: CLINIC | Age: 63
End: 2023-06-12
Payer: MEDICARE

## 2023-06-12 PROCEDURE — 83036 HEMOGLOBIN GLYCOSYLATED A1C: CPT | Performed by: FAMILY MEDICINE

## 2023-06-12 PROCEDURE — 80061 LIPID PANEL: CPT | Performed by: FAMILY MEDICINE

## 2023-06-12 PROCEDURE — 82306 VITAMIN D 25 HYDROXY: CPT | Performed by: FAMILY MEDICINE

## 2023-06-12 PROCEDURE — 85025 COMPLETE CBC W/AUTO DIFF WBC: CPT | Performed by: FAMILY MEDICINE

## 2023-06-12 PROCEDURE — 84439 ASSAY OF FREE THYROXINE: CPT | Performed by: FAMILY MEDICINE

## 2023-06-12 PROCEDURE — G0103 PSA SCREENING: HCPCS | Performed by: FAMILY MEDICINE

## 2023-06-12 PROCEDURE — 81001 URINALYSIS AUTO W/SCOPE: CPT | Performed by: FAMILY MEDICINE

## 2023-06-12 PROCEDURE — 80053 COMPREHEN METABOLIC PANEL: CPT | Performed by: FAMILY MEDICINE

## 2023-06-12 PROCEDURE — 84443 ASSAY THYROID STIM HORMONE: CPT | Performed by: FAMILY MEDICINE

## 2023-06-12 PROCEDURE — 82607 VITAMIN B-12: CPT | Performed by: FAMILY MEDICINE

## 2023-06-15 RX ORDER — ERGOCALCIFEROL 1.25 MG/1
CAPSULE ORAL
Qty: 12 CAPSULE | Refills: 1 | Status: SHIPPED | OUTPATIENT
Start: 2023-06-15

## 2023-06-15 RX ORDER — METOPROLOL TARTRATE 50 MG/1
TABLET, FILM COATED ORAL
Qty: 180 TABLET | Refills: 0 | Status: SHIPPED | OUTPATIENT
Start: 2023-06-15

## 2023-09-05 ENCOUNTER — OFFICE VISIT (OUTPATIENT)
Dept: CARDIOLOGY | Facility: CLINIC | Age: 63
End: 2023-09-05
Payer: MEDICARE

## 2023-09-05 VITALS
HEART RATE: 75 BPM | WEIGHT: 295 LBS | SYSTOLIC BLOOD PRESSURE: 124 MMHG | OXYGEN SATURATION: 96 % | HEIGHT: 70 IN | DIASTOLIC BLOOD PRESSURE: 81 MMHG | BODY MASS INDEX: 42.23 KG/M2

## 2023-09-05 DIAGNOSIS — E78.00 PURE HYPERCHOLESTEROLEMIA: ICD-10-CM

## 2023-09-05 DIAGNOSIS — I10 ESSENTIAL HYPERTENSION: ICD-10-CM

## 2023-09-05 DIAGNOSIS — I25.708 CORONARY ARTERY DISEASE OF BYPASS GRAFT OF NATIVE HEART WITH STABLE ANGINA PECTORIS: Primary | ICD-10-CM

## 2023-09-05 DIAGNOSIS — G47.33 OBSTRUCTIVE SLEEP APNEA: ICD-10-CM

## 2023-09-05 PROCEDURE — 1159F MED LIST DOCD IN RCRD: CPT | Performed by: INTERNAL MEDICINE

## 2023-09-05 PROCEDURE — 3074F SYST BP LT 130 MM HG: CPT | Performed by: INTERNAL MEDICINE

## 2023-09-05 PROCEDURE — 1160F RVW MEDS BY RX/DR IN RCRD: CPT | Performed by: INTERNAL MEDICINE

## 2023-09-05 PROCEDURE — 99214 OFFICE O/P EST MOD 30 MIN: CPT | Performed by: INTERNAL MEDICINE

## 2023-09-05 PROCEDURE — 3079F DIAST BP 80-89 MM HG: CPT | Performed by: INTERNAL MEDICINE

## 2023-09-05 NOTE — PROGRESS NOTES
"    Subjective:     Encounter Date:09/05/2023      Patient ID: Flaco Yan is a 63 y.o. male.    Chief Complaint:  History of Present Illness 63-year-old white male with history of coronary disease hypertension hyperlipidemia sleep apnea presents to my office for follow-up and patient is currently stable without any symptoms of chest pain or shortness of breath at rest on exertion.  No complains any PND orthopnea.  No palpitation dizziness syncope or patient has some swelling of the feet.  Patient is taking all her medicines regularly.  Patient does not smoke.  He has sleep apnea but does not use a CPAP machine.  He does not exercise very well.  He does not follow a good diet.    The following portions of the patient's history were reviewed and updated as appropriate: allergies, current medications, past family history, past medical history, past social history, past surgical history, and problem list.  Past Medical History:   Diagnosis Date    Coronary artery disease     GERD (gastroesophageal reflux disease)     Hyperlipidemia     Hypertension     Obesity      Past Surgical History:   Procedure Laterality Date    CARDIAC CATHETERIZATION      CARDIAC SURGERY      CORONARY ARTERY BYPASS GRAFT      CORONARY STENT PLACEMENT      FINGER SURGERY      TOTAL KNEE ARTHROPLASTY Left 6/19/2020    Procedure: LEFT TOTAL KNEE;  Surgeon: Fernie Peoples II, MD;  Location: [facility];  Service: Orthopedics;  Laterality: Left;     /81   Pulse 75   Ht 177.8 cm (70\")   Wt 134 kg (295 lb)   SpO2 96%   BMI 42.33 kg/m²   Family History   Problem Relation Age of Onset    Stroke Mother     Heart disease Mother     Heart disease Father     Heart disease Sister        Current Outpatient Medications:     aspirin 325 MG tablet, Take 81 mg by mouth Every Night. 6/12 @1400 Call out to Cecy at Dr Peoples office as to stopping ASA. Message left for her to call with order., Disp: , Rfl:     esomeprazole (nexIUM) 20 MG " capsule, Take 1 capsule by mouth Every Morning Before Breakfast., Disp: , Rfl:     lisinopril (PRINIVIL,ZESTRIL) 5 MG tablet, Take 1 tablet by mouth Daily for 180 days., Disp: 90 tablet, Rfl: 3    metoprolol tartrate (LOPRESSOR) 50 MG tablet, TAKE ONE TABLET BY MOUTH TWICE A DAY, Disp: 180 tablet, Rfl: 0    rosuvastatin (CRESTOR) 5 MG tablet, TAKE ONE TABLET BY MOUTH ONCE NIGHTLY, Disp: 90 tablet, Rfl: 2    vitamin D (ERGOCALCIFEROL) 1.25 MG (53432 UT) capsule capsule, TAKE ONE CAPSULE BY MOUTH EVERY 7 DAYS, Disp: 12 capsule, Rfl: 1    amLODIPine (NORVASC) 10 MG tablet, Take 0.5 tablets by mouth Daily for 90 doses., Disp: 90 tablet, Rfl: 0  No Known Allergies  Social History     Socioeconomic History    Marital status: Single   Tobacco Use    Smoking status: Former     Types: Cigarettes     Quit date: 1999     Years since quittin.7     Passive exposure: Past    Smokeless tobacco: Never   Vaping Use    Vaping Use: Never used   Substance and Sexual Activity    Alcohol use: No    Drug use: No    Sexual activity: Defer     Review of Systems   Constitutional: Positive for malaise/fatigue.   Cardiovascular:  Positive for leg swelling. Negative for chest pain, dyspnea on exertion and palpitations.   Respiratory:  Negative for cough and shortness of breath.    Gastrointestinal:  Positive for nausea. Negative for abdominal pain and vomiting.   Neurological:  Negative for dizziness, focal weakness, headaches, light-headedness and numbness.   All other systems reviewed and are negative.           Objective:     Constitutional:       Appearance: Well-developed.   Eyes:      General: No scleral icterus.     Conjunctiva/sclera: Conjunctivae normal.   HENT:      Head: Normocephalic and atraumatic.   Neck:      Vascular: No carotid bruit or JVD.   Pulmonary:      Effort: Pulmonary effort is normal.      Breath sounds: Normal breath sounds. No wheezing. No rales.   Cardiovascular:      Normal rate. Regular rhythm.    Pulses:     Intact distal pulses.   Abdominal:      General: Bowel sounds are normal.      Palpations: Abdomen is soft.   Musculoskeletal:      Cervical back: Normal range of motion and neck supple. Skin:     General: Skin is warm and dry.      Findings: No rash.   Neurological:      Mental Status: Alert.     Procedures    Lab Review:         MDM    #1 coronary disease  Patient had MI and stent placement in the past but had coronary bypass surgery x3 vessels with a LIMA to LAD and saphenous graft to the marginal branch and RCA and is currently stable on medications with normal function  2.  Hypertension  Patient blood pressure currently stable on lisinopril metoprolol  3.  Hyperlipidemia  Patient is on Crestor and the lipid levels are well within normal limits  4.  Sleep apnea  Patient has sleep apnea but does not use a CPAP machine.    Patient's previous medical records, labs, and EKG were reviewed and discussed with the patient at today's visit.

## 2023-09-11 RX ORDER — METOPROLOL TARTRATE 50 MG/1
TABLET, FILM COATED ORAL
Qty: 180 TABLET | Refills: 0 | Status: SHIPPED | OUTPATIENT
Start: 2023-09-11

## 2023-12-06 RX ORDER — ERGOCALCIFEROL 1.25 MG/1
CAPSULE ORAL
Qty: 12 CAPSULE | Refills: 1 | Status: SHIPPED | OUTPATIENT
Start: 2023-12-06

## 2023-12-12 RX ORDER — METOPROLOL TARTRATE 50 MG/1
TABLET, FILM COATED ORAL
Qty: 180 TABLET | Refills: 0 | Status: SHIPPED | OUTPATIENT
Start: 2023-12-12

## 2023-12-12 RX ORDER — ROSUVASTATIN CALCIUM 5 MG/1
TABLET, COATED ORAL
Qty: 90 TABLET | Refills: 2 | Status: SHIPPED | OUTPATIENT
Start: 2023-12-12

## 2023-12-13 ENCOUNTER — OFFICE VISIT (OUTPATIENT)
Dept: FAMILY MEDICINE CLINIC | Facility: CLINIC | Age: 63
End: 2023-12-13
Payer: MEDICARE

## 2023-12-13 ENCOUNTER — LAB (OUTPATIENT)
Dept: FAMILY MEDICINE CLINIC | Facility: CLINIC | Age: 63
End: 2023-12-13
Payer: MEDICARE

## 2023-12-13 VITALS
HEIGHT: 70 IN | WEIGHT: 290.8 LBS | SYSTOLIC BLOOD PRESSURE: 132 MMHG | BODY MASS INDEX: 41.63 KG/M2 | RESPIRATION RATE: 16 BRPM | DIASTOLIC BLOOD PRESSURE: 88 MMHG | HEART RATE: 81 BPM | OXYGEN SATURATION: 98 %

## 2023-12-13 DIAGNOSIS — I25.708 CORONARY ARTERY DISEASE OF BYPASS GRAFT OF NATIVE HEART WITH STABLE ANGINA PECTORIS: Primary | ICD-10-CM

## 2023-12-13 DIAGNOSIS — E78.2 MODERATE MIXED HYPERLIPIDEMIA NOT REQUIRING STATIN THERAPY: ICD-10-CM

## 2023-12-13 DIAGNOSIS — E34.9 TESTOSTERONE DEFICIENCY: ICD-10-CM

## 2023-12-13 DIAGNOSIS — R79.9 ABNORMAL FINDING OF BLOOD CHEMISTRY, UNSPECIFIED: ICD-10-CM

## 2023-12-13 DIAGNOSIS — E55.9 VITAMIN D DEFICIENCY, UNSPECIFIED: ICD-10-CM

## 2023-12-13 DIAGNOSIS — G47.33 OBSTRUCTIVE SLEEP APNEA: ICD-10-CM

## 2023-12-13 DIAGNOSIS — M17.0 PRIMARY OSTEOARTHRITIS OF BOTH KNEES: ICD-10-CM

## 2023-12-13 DIAGNOSIS — I10 PRIMARY HYPERTENSION: ICD-10-CM

## 2023-12-13 DIAGNOSIS — I25.708 CORONARY ARTERY DISEASE OF BYPASS GRAFT OF NATIVE HEART WITH STABLE ANGINA PECTORIS: ICD-10-CM

## 2023-12-13 PROBLEM — M10.9 GOUT: Status: RESOLVED | Noted: 2017-11-10 | Resolved: 2023-12-13

## 2023-12-13 LAB
25(OH)D3 SERPL-MCNC: 36.8 NG/ML (ref 30–100)
ALBUMIN SERPL-MCNC: 4.6 G/DL (ref 3.5–5.2)
ALBUMIN/GLOB SERPL: 2 G/DL
ALP SERPL-CCNC: 59 U/L (ref 39–117)
ALT SERPL W P-5'-P-CCNC: 25 U/L (ref 1–41)
ANION GAP SERPL CALCULATED.3IONS-SCNC: 12 MMOL/L (ref 5–15)
AST SERPL-CCNC: 24 U/L (ref 1–40)
BASOPHILS # BLD AUTO: 0.06 10*3/MM3 (ref 0–0.2)
BASOPHILS NFR BLD AUTO: 1 % (ref 0–1.5)
BILIRUB SERPL-MCNC: 0.5 MG/DL (ref 0–1.2)
BILIRUB UR QL STRIP: NEGATIVE
BUN SERPL-MCNC: 11 MG/DL (ref 8–23)
BUN/CREAT SERPL: 11.5 (ref 7–25)
CALCIUM SPEC-SCNC: 9.8 MG/DL (ref 8.6–10.5)
CHLORIDE SERPL-SCNC: 104 MMOL/L (ref 98–107)
CHOLEST SERPL-MCNC: 153 MG/DL (ref 0–200)
CLARITY UR: CLEAR
CO2 SERPL-SCNC: 25 MMOL/L (ref 22–29)
COLOR UR: YELLOW
CREAT SERPL-MCNC: 0.96 MG/DL (ref 0.76–1.27)
DEPRECATED RDW RBC AUTO: 41.2 FL (ref 37–54)
EGFRCR SERPLBLD CKD-EPI 2021: 89.9 ML/MIN/1.73
EOSINOPHIL # BLD AUTO: 0.26 10*3/MM3 (ref 0–0.4)
EOSINOPHIL NFR BLD AUTO: 4.2 % (ref 0.3–6.2)
ERYTHROCYTE [DISTWIDTH] IN BLOOD BY AUTOMATED COUNT: 12.5 % (ref 12.3–15.4)
GLOBULIN UR ELPH-MCNC: 2.3 GM/DL
GLUCOSE SERPL-MCNC: 100 MG/DL (ref 65–99)
GLUCOSE UR STRIP-MCNC: NEGATIVE MG/DL
HBA1C MFR BLD: 6.1 % (ref 4.8–5.6)
HCT VFR BLD AUTO: 42.9 % (ref 37.5–51)
HDLC SERPL-MCNC: 37 MG/DL (ref 40–60)
HGB BLD-MCNC: 14.8 G/DL (ref 13–17.7)
HGB UR QL STRIP.AUTO: NEGATIVE
HOLD SPECIMEN: NORMAL
IMM GRANULOCYTES # BLD AUTO: 0.01 10*3/MM3 (ref 0–0.05)
IMM GRANULOCYTES NFR BLD AUTO: 0.2 % (ref 0–0.5)
KETONES UR QL STRIP: NEGATIVE
LDLC SERPL CALC-MCNC: 100 MG/DL (ref 0–100)
LDLC/HDLC SERPL: 2.7 {RATIO}
LEUKOCYTE ESTERASE UR QL STRIP.AUTO: NEGATIVE
LYMPHOCYTES # BLD AUTO: 2.16 10*3/MM3 (ref 0.7–3.1)
LYMPHOCYTES NFR BLD AUTO: 35.2 % (ref 19.6–45.3)
MCH RBC QN AUTO: 31 PG (ref 26.6–33)
MCHC RBC AUTO-ENTMCNC: 34.5 G/DL (ref 31.5–35.7)
MCV RBC AUTO: 89.9 FL (ref 79–97)
MONOCYTES # BLD AUTO: 0.43 10*3/MM3 (ref 0.1–0.9)
MONOCYTES NFR BLD AUTO: 7 % (ref 5–12)
NEUTROPHILS NFR BLD AUTO: 3.21 10*3/MM3 (ref 1.7–7)
NEUTROPHILS NFR BLD AUTO: 52.4 % (ref 42.7–76)
NITRITE UR QL STRIP: NEGATIVE
NRBC BLD AUTO-RTO: 0 /100 WBC (ref 0–0.2)
PH UR STRIP.AUTO: 5.5 [PH] (ref 5–8)
PLATELET # BLD AUTO: 295 10*3/MM3 (ref 140–450)
PMV BLD AUTO: 11 FL (ref 6–12)
POTASSIUM SERPL-SCNC: 4.1 MMOL/L (ref 3.5–5.2)
PROT SERPL-MCNC: 6.9 G/DL (ref 6–8.5)
PROT UR QL STRIP: NEGATIVE
RBC # BLD AUTO: 4.77 10*6/MM3 (ref 4.14–5.8)
SODIUM SERPL-SCNC: 141 MMOL/L (ref 136–145)
SP GR UR STRIP: 1.02 (ref 1–1.03)
TRIGL SERPL-MCNC: 81 MG/DL (ref 0–150)
URATE SERPL-MCNC: 7.3 MG/DL (ref 3.4–7)
UROBILINOGEN UR QL STRIP: NORMAL
VLDLC SERPL-MCNC: 16 MG/DL (ref 5–40)
WBC NRBC COR # BLD AUTO: 6.13 10*3/MM3 (ref 3.4–10.8)

## 2023-12-13 PROCEDURE — 80061 LIPID PANEL: CPT | Performed by: FAMILY MEDICINE

## 2023-12-13 PROCEDURE — 84403 ASSAY OF TOTAL TESTOSTERONE: CPT | Performed by: FAMILY MEDICINE

## 2023-12-13 PROCEDURE — 85025 COMPLETE CBC W/AUTO DIFF WBC: CPT | Performed by: FAMILY MEDICINE

## 2023-12-13 PROCEDURE — 82172 ASSAY OF APOLIPOPROTEIN: CPT | Performed by: FAMILY MEDICINE

## 2023-12-13 PROCEDURE — 80053 COMPREHEN METABOLIC PANEL: CPT | Performed by: FAMILY MEDICINE

## 2023-12-13 PROCEDURE — 1160F RVW MEDS BY RX/DR IN RCRD: CPT | Performed by: FAMILY MEDICINE

## 2023-12-13 PROCEDURE — 3079F DIAST BP 80-89 MM HG: CPT | Performed by: FAMILY MEDICINE

## 2023-12-13 PROCEDURE — 83036 HEMOGLOBIN GLYCOSYLATED A1C: CPT | Performed by: FAMILY MEDICINE

## 2023-12-13 PROCEDURE — 84550 ASSAY OF BLOOD/URIC ACID: CPT | Performed by: FAMILY MEDICINE

## 2023-12-13 PROCEDURE — 81003 URINALYSIS AUTO W/O SCOPE: CPT | Performed by: FAMILY MEDICINE

## 2023-12-13 PROCEDURE — 99214 OFFICE O/P EST MOD 30 MIN: CPT | Performed by: FAMILY MEDICINE

## 2023-12-13 PROCEDURE — 3075F SYST BP GE 130 - 139MM HG: CPT | Performed by: FAMILY MEDICINE

## 2023-12-13 PROCEDURE — 84402 ASSAY OF FREE TESTOSTERONE: CPT | Performed by: FAMILY MEDICINE

## 2023-12-13 PROCEDURE — 82306 VITAMIN D 25 HYDROXY: CPT | Performed by: FAMILY MEDICINE

## 2023-12-13 PROCEDURE — 1159F MED LIST DOCD IN RCRD: CPT | Performed by: FAMILY MEDICINE

## 2023-12-13 PROCEDURE — 36415 COLL VENOUS BLD VENIPUNCTURE: CPT

## 2023-12-13 NOTE — PROGRESS NOTES
"Chief Complaint  Coronary Artery Disease, Hypertension, and Hyperlipidemia    Subjective        Flaco Yan presents to [facility]  History of Present Illness    The patient presents today for a 6-month follow-up.    He is doing well overall. He denies any chest pain or breathing problems. He is not currently working. He stays busy doing things around the house and taking care of his children and grandchildren.    His eyes burn and are irritated occasionally. He does not go to an eye doctor routinely. He had LASIK approximately 10 years ago. He uses monovision, which works well for him. His hearing is stable. He sees his cardiologist every 6 months. He has occasional chest pressure, pain, and discomfort. He is unable to tell if it is abdominal or muscular. He has not experienced any issues in approximately 1.5 years. He feels like someone is stabbing him and is usually muscular. He has not had a colonoscopy recently. He sent off for the info and they sent a package. He has not heard anything.    He is currently taking Ozempic 1 mg weekly for weight loss. He will be on 1 mg next week. He has lost some weight and seems to curb his appetite significantly.    Objective   Vital Signs:  /88   Pulse 81   Resp 16   Ht 177.8 cm (70\")   Wt 132 kg (290 lb 12.8 oz)   SpO2 98%   BMI 41.73 kg/m²   Estimated body mass index is 41.73 kg/m² as calculated from the following:    Height as of this encounter: 177.8 cm (70\").    Weight as of this encounter: 132 kg (290 lb 12.8 oz).         Physical Exam  Constitutional:       Appearance: Normal appearance. He is well-developed and normal weight.   HENT:      Head: Normocephalic and atraumatic.      Right Ear: Tympanic membrane, ear canal and external ear normal.      Left Ear: Tympanic membrane, ear canal and external ear normal.      Nose: Nose normal.      Mouth/Throat:      Mouth: Mucous membranes are moist.      Pharynx: Oropharynx is " clear. No oropharyngeal exudate.   Eyes:      Extraocular Movements: Extraocular movements intact.      Conjunctiva/sclera: Conjunctivae normal.      Pupils: Pupils are equal, round, and reactive to light.   Cardiovascular:      Rate and Rhythm: Normal rate and regular rhythm.      Pulses: Normal pulses.      Heart sounds: Normal heart sounds.   Pulmonary:      Effort: Pulmonary effort is normal.      Breath sounds: Normal breath sounds.      Comments: Barrel chest.   BS are ok  Abdominal:      General: Bowel sounds are normal.      Palpations: Abdomen is soft.      Comments: Obese.      Musculoskeletal:         General: Normal range of motion.      Cervical back: Normal range of motion and neck supple.      Comments: OA in knees , hips, back and hands   Skin:     General: Skin is warm and dry.   Neurological:      General: No focal deficit present.      Mental Status: He is alert and oriented to person, place, and time. Mental status is at baseline.   Psychiatric:         Mood and Affect: Mood normal.         Behavior: Behavior normal.         Thought Content: Thought content normal.         Judgment: Judgment normal.        Result Review :                   Assessment and Plan   1. Coronary artery disease  - The patient is a 63-year-old white male with known coronary artery disease. He is status post coronary artery bypass grafting, but he has been angina free for many years now. He still sees his cardiologist once a year, but he has not needed any intervention for many years. He is active around the house and in his yard and occasionally has some chest pain, but this sounds more like chest wall pain. In any case, I think since he is doing so well, we really need to continue following up with his cardiologist and we are not too worried about any unstable angina coming on. We will continue to moderate his risk factors, which include his cholesterol, blood sugars, his weight, diet, and exercise.    2. Mixed  hyperlipidemia requiring a statin  - The patient is following a reduced calorie, low carb diet as best he can, but I do not really think he follows it very closely. He has been started on Ozempic for some weight loss and I think that is cutting him back on everything right now and he will probably lose some weight and bring his sugars and his cholesterol down. He does take Crestor 5 mg and depending on his level of lipids today, I may have to adjust that. He is going to be sent down for some more blood today.    3. Hypertension  - The patient's blood pressure today is 132/88 mmHg. He is doing well and we are not too concerned about that right now if he takes his metoprolol and lisinopril.    4. Testosterone deficiency  - The patient does have a history of testosterone deficiency. I do not think he is taking any testosterone right now though, so we may discuss that after today's blood draw.    5. Primary osteoarthritis of both knees  - The patient has knee pain bilaterally. It does not stop him from getting around, but he does have pain daily and stiffness. He tries to stay active and stretch, but he does not take any medications for it other than occasional Tylenol.    6. Obstructive sleep apnea  - The patient continues to wear a CPAP machine and does well.    7. Abnormal findings on blood chemistry  - The patient has had some isolated elevated blood sugars when he does random blood work and sugars are checked. He also had an elevated A1c at his last visit with us. We are going to repeat the A1c today.    8. Vitamin D deficiency  - He has had a previous vitamin D deficiency, so we will recheck it today. He is taking vitamin D once a week.         Follow Up   Return in about 8 months (around 8/13/2024), or if symptoms worsen or fail to improve, for Medicare Wellness-8 mos           14 mos for checkup in between.  Patient was given instructions and counseling regarding his condition or for health maintenance advice.  Please see specific information pulled into the AVS if appropriate.     Transcribed from ambient dictation for Mike Redd MD by Meenakshi Alanis.  12/13/23   10:39 EST    Patient or patient representative verbalized consent to the visit recording.  I have personally performed the services described in this document as transcribed by the above individual, and it is both accurate and complete.  Mike Redd MD  12/16/2023  12:46 EST

## 2023-12-14 RX ORDER — ALLOPURINOL 100 MG/1
100 TABLET ORAL DAILY
Qty: 30 TABLET | Refills: 2 | Status: SHIPPED | OUTPATIENT
Start: 2023-12-14 | End: 2024-01-13

## 2023-12-14 NOTE — PROGRESS NOTES
Chastity tell Flaco that his uric acid is elevated so he needs to be on allopurinol.  Take 100 mg every day for a few months and then we will recheck his level and adjust his dose.  Everything else should remain the same

## 2023-12-16 LAB — APO B SERPL-MCNC: 91 MG/DL

## 2023-12-24 LAB
TESTOST FREE SERPL-MCNC: 4.5 PG/ML (ref 6.6–18.1)
TESTOST SERPL-MCNC: 417.5 NG/DL (ref 264–916)

## 2024-03-06 ENCOUNTER — OFFICE VISIT (OUTPATIENT)
Dept: CARDIOLOGY | Facility: CLINIC | Age: 64
End: 2024-03-06
Payer: MEDICARE

## 2024-03-06 VITALS
BODY MASS INDEX: 42.09 KG/M2 | HEIGHT: 70 IN | DIASTOLIC BLOOD PRESSURE: 81 MMHG | OXYGEN SATURATION: 99 % | WEIGHT: 294 LBS | SYSTOLIC BLOOD PRESSURE: 127 MMHG | HEART RATE: 80 BPM

## 2024-03-06 DIAGNOSIS — I25.708 CORONARY ARTERY DISEASE OF BYPASS GRAFT OF NATIVE HEART WITH STABLE ANGINA PECTORIS: Primary | ICD-10-CM

## 2024-03-06 DIAGNOSIS — G47.33 OBSTRUCTIVE SLEEP APNEA: ICD-10-CM

## 2024-03-06 DIAGNOSIS — I10 ESSENTIAL HYPERTENSION: ICD-10-CM

## 2024-03-06 DIAGNOSIS — E78.00 PURE HYPERCHOLESTEROLEMIA: ICD-10-CM

## 2024-03-06 PROCEDURE — 99214 OFFICE O/P EST MOD 30 MIN: CPT | Performed by: INTERNAL MEDICINE

## 2024-03-06 PROCEDURE — 3080F DIAST BP >= 90 MM HG: CPT | Performed by: INTERNAL MEDICINE

## 2024-03-06 PROCEDURE — 3077F SYST BP >= 140 MM HG: CPT | Performed by: INTERNAL MEDICINE

## 2024-03-06 PROCEDURE — 1160F RVW MEDS BY RX/DR IN RCRD: CPT | Performed by: INTERNAL MEDICINE

## 2024-03-06 PROCEDURE — 1159F MED LIST DOCD IN RCRD: CPT | Performed by: INTERNAL MEDICINE

## 2024-03-06 RX ORDER — AMLODIPINE BESYLATE 10 MG/1
TABLET ORAL
Qty: 90 TABLET | Refills: 0 | Status: SHIPPED | OUTPATIENT
Start: 2024-03-06

## 2024-03-06 NOTE — PROGRESS NOTES
"    Subjective:     Encounter Date:03/06/2024      Patient ID: Flaco Yan is a 64 y.o. male.    Chief Complaint:  Coronary Artery Disease  Symptoms include leg swelling. Pertinent negatives include no chest pain, dizziness, palpitations or shortness of breath.   64-year-old white male with history of coronary disease status post and placement of the RCA in the past and status post coronary bypass surgery hypertension hyperlipidemia sleep apnea presents to office for a follow-up.  Patient is currently stable without any symptoms of chest pain or shortness of breath at rest or exertion.  No complaint of any PND orthopnea.  No palpitation dizziness syncope.  He has some mild swelling of the feet but he is taking his medicines regularly.  He does not smoke.    The following portions of the patient's history were reviewed and updated as appropriate: allergies, current medications, past family history, past medical history, past social history, past surgical history, and problem list.  Past Medical History:   Diagnosis Date    Coronary artery disease     GERD (gastroesophageal reflux disease)     Hyperlipidemia     Hypertension     Obesity      Past Surgical History:   Procedure Laterality Date    CARDIAC CATHETERIZATION      CARDIAC SURGERY      CORONARY ARTERY BYPASS GRAFT      CORONARY STENT PLACEMENT      FINGER SURGERY      TOTAL KNEE ARTHROPLASTY Left 6/19/2020    Procedure: LEFT TOTAL KNEE;  Surgeon: Fernie Peoples II, MD;  Location: [facility];  Service: Orthopedics;  Laterality: Left;     BP (!) 162/111   Pulse 80   Ht 177.8 cm (70\")   Wt 133 kg (294 lb)   SpO2 99%   BMI 42.18 kg/m²   Family History   Problem Relation Age of Onset    Stroke Mother     Heart disease Mother     Heart disease Father     Heart disease Sister        Current Outpatient Medications:     aspirin 325 MG tablet, Take 81 mg by mouth Every Night. 6/12 @1400 Call out to Cecy at Dr Peoples office as to stopping ASA. " Message left for her to call with order., Disp: , Rfl:     esomeprazole (nexIUM) 20 MG capsule, Take 1 capsule by mouth Every Morning Before Breakfast., Disp: , Rfl:     metoprolol tartrate (LOPRESSOR) 50 MG tablet, TAKE 1 TABLET BY MOUTH TWICE A DAY, Disp: 180 tablet, Rfl: 0    rosuvastatin (CRESTOR) 5 MG tablet, TAKE ONE TABLET BY MOUTH ONCE NIGHTLY, Disp: 90 tablet, Rfl: 2    vitamin D (ERGOCALCIFEROL) 1.25 MG (25566 UT) capsule capsule, TAKE ONE CAPSULE BY MOUTH EVERY 7 DAYS, Disp: 12 capsule, Rfl: 1    lisinopril (PRINIVIL,ZESTRIL) 5 MG tablet, Take 1 tablet by mouth Daily for 180 days., Disp: 90 tablet, Rfl: 3  No Known Allergies  Social History     Socioeconomic History    Marital status: Single   Tobacco Use    Smoking status: Former     Current packs/day: 0.00     Average packs/day: 1 pack/day for 20.0 years (20.0 ttl pk-yrs)     Types: Cigarettes     Start date: 1979     Quit date: 1999     Years since quittin.2     Passive exposure: Past    Smokeless tobacco: Never   Vaping Use    Vaping status: Never Used   Substance and Sexual Activity    Alcohol use: No    Drug use: No    Sexual activity: Defer     Review of Systems   Constitutional: Negative for malaise/fatigue.   Cardiovascular:  Positive for leg swelling. Negative for chest pain, dyspnea on exertion and palpitations.   Respiratory:  Negative for cough and shortness of breath.    Gastrointestinal:  Negative for abdominal pain, nausea and vomiting.   Neurological:  Negative for dizziness, focal weakness, headaches, light-headedness and numbness.   All other systems reviewed and are negative.             Objective:     Constitutional:       Appearance: Well-developed.   Eyes:      General: No scleral icterus.     Conjunctiva/sclera: Conjunctivae normal.   HENT:      Head: Normocephalic and atraumatic.   Neck:      Vascular: No carotid bruit or JVD.   Pulmonary:      Effort: Pulmonary effort is normal.      Breath sounds: Normal breath  sounds. No wheezing. No rales.   Cardiovascular:      Normal rate. Regular rhythm.   Pulses:     Intact distal pulses.   Abdominal:      General: Bowel sounds are normal.      Palpations: Abdomen is soft.   Musculoskeletal:      Cervical back: Normal range of motion and neck supple. Skin:     General: Skin is warm and dry.      Findings: No rash.   Neurological:      Mental Status: Alert.       Procedures    Lab Review:         MDM    #1 coronary artery disease  Patient had coronary bypass surgery x 3 vessels with a LIMA to LAD and saphenous graft to the marginal branch and RCA and is currently stable on medications  2.  Hypertension  Patient blood pressure very high and is on lisinopril and metoprolol but will add amlodipine but his blood pressure is probably high because of sleep apnea and he does not use a CPAP machine  3.  Sleep apnea  Patient has severe sleep apnea but does not use a machine and have encouraged him to lose weight follow a good diet and also uses CPAP machine  4.  Hyperlipidemia  Patient is on Crestor and the lipid levels are well within normal limits    Patient's previous medical records, labs, and EKG were reviewed and discussed with the patient at today's visit.

## 2024-03-13 RX ORDER — METOPROLOL TARTRATE 50 MG/1
TABLET, FILM COATED ORAL
Qty: 180 TABLET | Refills: 3 | Status: SHIPPED | OUTPATIENT
Start: 2024-03-13

## 2024-06-10 RX ORDER — AMLODIPINE BESYLATE 10 MG/1
TABLET ORAL
Qty: 90 TABLET | Refills: 0 | Status: SHIPPED | OUTPATIENT
Start: 2024-06-10

## 2024-06-10 RX ORDER — LISINOPRIL 5 MG/1
5 TABLET ORAL DAILY
Qty: 90 TABLET | Refills: 3 | Status: SHIPPED | OUTPATIENT
Start: 2024-06-10

## 2024-06-10 NOTE — TELEPHONE ENCOUNTER
Rx Refill Note  Requested Prescriptions     Pending Prescriptions Disp Refills    amLODIPine (NORVASC) 10 MG tablet [Pharmacy Med Name: AMLODIPINE BESYLATE 10 MG TAB] 90 tablet 0     Sig: TAKE 1 TABLET BY MOUTH EVERY MORNING      Last office visit with prescribing clinician: 3/6/2024   Last telemedicine visit with prescribing clinician: Visit date not found   Next office visit with prescribing clinician: 9/16/2024                         Would you like a call back once the refill request has been completed: [] Yes [] No    If the office needs to give you a call back, can they leave a voicemail: [] Yes [] No    Edel Roldan MA  06/10/24, 08:53 EDT

## 2024-06-22 ENCOUNTER — APPOINTMENT (OUTPATIENT)
Dept: GENERAL RADIOLOGY | Facility: HOSPITAL | Age: 64
End: 2024-06-22
Payer: MEDICARE

## 2024-06-22 ENCOUNTER — HOSPITAL ENCOUNTER (OUTPATIENT)
Facility: HOSPITAL | Age: 64
Setting detail: OBSERVATION
Discharge: HOME | End: 2024-06-24
Attending: EMERGENCY MEDICINE | Admitting: EMERGENCY MEDICINE
Payer: MEDICARE

## 2024-06-22 ENCOUNTER — APPOINTMENT (OUTPATIENT)
Dept: CT IMAGING | Facility: HOSPITAL | Age: 64
End: 2024-06-22
Payer: MEDICARE

## 2024-06-22 DIAGNOSIS — I65.21 RIGHT CAROTID ARTERY OCCLUSION: ICD-10-CM

## 2024-06-22 DIAGNOSIS — I65.22 LEFT CAROTID STENOSIS: ICD-10-CM

## 2024-06-22 DIAGNOSIS — S80.02XA CONTUSION OF LEFT KNEE, INITIAL ENCOUNTER: ICD-10-CM

## 2024-06-22 DIAGNOSIS — I44.0 1ST DEGREE AV BLOCK: ICD-10-CM

## 2024-06-22 DIAGNOSIS — I25.10 CORONARY ARTERY DISEASE INVOLVING NATIVE CORONARY ARTERY OF NATIVE HEART, UNSPECIFIED WHETHER ANGINA PRESENT: ICD-10-CM

## 2024-06-22 DIAGNOSIS — R55 SYNCOPE AND COLLAPSE: Primary | ICD-10-CM

## 2024-06-22 DIAGNOSIS — T67.5XXA HEAT EXHAUSTION, INITIAL ENCOUNTER: ICD-10-CM

## 2024-06-22 DIAGNOSIS — Z95.1 HX OF CABG: ICD-10-CM

## 2024-06-22 LAB
ALBUMIN SERPL-MCNC: 4.6 G/DL (ref 3.5–5.2)
ALBUMIN/GLOB SERPL: 1.8 G/DL
ALP SERPL-CCNC: 65 U/L (ref 39–117)
ALT SERPL W P-5'-P-CCNC: 21 U/L (ref 1–41)
ANION GAP SERPL CALCULATED.3IONS-SCNC: 11.1 MMOL/L (ref 5–15)
AST SERPL-CCNC: 23 U/L (ref 1–40)
BASOPHILS # BLD AUTO: 0.05 10*3/MM3 (ref 0–0.2)
BASOPHILS NFR BLD AUTO: 0.7 % (ref 0–1.5)
BILIRUB SERPL-MCNC: 0.5 MG/DL (ref 0–1.2)
BUN SERPL-MCNC: 12 MG/DL (ref 8–23)
BUN/CREAT SERPL: 13.8 (ref 7–25)
CALCIUM SPEC-SCNC: 9.6 MG/DL (ref 8.6–10.5)
CHLORIDE SERPL-SCNC: 104 MMOL/L (ref 98–107)
CK SERPL-CCNC: 153 U/L (ref 20–200)
CO2 SERPL-SCNC: 23.9 MMOL/L (ref 22–29)
CREAT SERPL-MCNC: 0.87 MG/DL (ref 0.76–1.27)
DEPRECATED RDW RBC AUTO: 41.2 FL (ref 37–54)
EGFRCR SERPLBLD CKD-EPI 2021: 97.6 ML/MIN/1.73
EOSINOPHIL # BLD AUTO: 0.23 10*3/MM3 (ref 0–0.4)
EOSINOPHIL NFR BLD AUTO: 3.4 % (ref 0.3–6.2)
ERYTHROCYTE [DISTWIDTH] IN BLOOD BY AUTOMATED COUNT: 12.5 % (ref 12.3–15.4)
GLOBULIN UR ELPH-MCNC: 2.5 GM/DL
GLUCOSE SERPL-MCNC: 132 MG/DL (ref 65–99)
HCT VFR BLD AUTO: 43.9 % (ref 37.5–51)
HGB BLD-MCNC: 14.5 G/DL (ref 13–17.7)
IMM GRANULOCYTES # BLD AUTO: 0.02 10*3/MM3 (ref 0–0.05)
IMM GRANULOCYTES NFR BLD AUTO: 0.3 % (ref 0–0.5)
LYMPHOCYTES # BLD AUTO: 2.39 10*3/MM3 (ref 0.7–3.1)
LYMPHOCYTES NFR BLD AUTO: 35.1 % (ref 19.6–45.3)
MCH RBC QN AUTO: 30 PG (ref 26.6–33)
MCHC RBC AUTO-ENTMCNC: 33 G/DL (ref 31.5–35.7)
MCV RBC AUTO: 90.9 FL (ref 79–97)
MONOCYTES # BLD AUTO: 0.53 10*3/MM3 (ref 0.1–0.9)
MONOCYTES NFR BLD AUTO: 7.8 % (ref 5–12)
NEUTROPHILS NFR BLD AUTO: 3.59 10*3/MM3 (ref 1.7–7)
NEUTROPHILS NFR BLD AUTO: 52.7 % (ref 42.7–76)
NRBC BLD AUTO-RTO: 0 /100 WBC (ref 0–0.2)
PLATELET # BLD AUTO: 266 10*3/MM3 (ref 140–450)
PMV BLD AUTO: 10.4 FL (ref 6–12)
POTASSIUM SERPL-SCNC: 4.4 MMOL/L (ref 3.5–5.2)
PROT SERPL-MCNC: 7.1 G/DL (ref 6–8.5)
RBC # BLD AUTO: 4.83 10*6/MM3 (ref 4.14–5.8)
SODIUM SERPL-SCNC: 139 MMOL/L (ref 136–145)
TROPONIN T SERPL HS-MCNC: 15 NG/L
WBC NRBC COR # BLD AUTO: 6.81 10*3/MM3 (ref 3.4–10.8)

## 2024-06-22 PROCEDURE — G0378 HOSPITAL OBSERVATION PER HR: HCPCS

## 2024-06-22 PROCEDURE — 93005 ELECTROCARDIOGRAM TRACING: CPT | Performed by: EMERGENCY MEDICINE

## 2024-06-22 PROCEDURE — 80053 COMPREHEN METABOLIC PANEL: CPT | Performed by: EMERGENCY MEDICINE

## 2024-06-22 PROCEDURE — 82550 ASSAY OF CK (CPK): CPT | Performed by: EMERGENCY MEDICINE

## 2024-06-22 PROCEDURE — 73564 X-RAY EXAM KNEE 4 OR MORE: CPT

## 2024-06-22 PROCEDURE — 96361 HYDRATE IV INFUSION ADD-ON: CPT

## 2024-06-22 PROCEDURE — 70450 CT HEAD/BRAIN W/O DYE: CPT

## 2024-06-22 PROCEDURE — 84484 ASSAY OF TROPONIN QUANT: CPT | Performed by: EMERGENCY MEDICINE

## 2024-06-22 PROCEDURE — 93005 ELECTROCARDIOGRAM TRACING: CPT

## 2024-06-22 PROCEDURE — 71045 X-RAY EXAM CHEST 1 VIEW: CPT

## 2024-06-22 PROCEDURE — 85025 COMPLETE CBC W/AUTO DIFF WBC: CPT | Performed by: EMERGENCY MEDICINE

## 2024-06-22 PROCEDURE — 25810000003 SODIUM CHLORIDE 0.9 % SOLUTION: Performed by: EMERGENCY MEDICINE

## 2024-06-22 PROCEDURE — 99285 EMERGENCY DEPT VISIT HI MDM: CPT

## 2024-06-22 RX ORDER — AMLODIPINE BESYLATE 10 MG/1
10 TABLET ORAL EVERY EVENING
COMMUNITY
End: 2024-06-24 | Stop reason: HOSPADM

## 2024-06-22 RX ORDER — ASPIRIN 325 MG
325 TABLET, DELAYED RELEASE (ENTERIC COATED) ORAL ONCE
Status: DISCONTINUED | OUTPATIENT
Start: 2024-06-22 | End: 2024-06-22

## 2024-06-22 RX ORDER — SODIUM CHLORIDE 9 MG/ML
100 INJECTION, SOLUTION INTRAVENOUS CONTINUOUS
Status: DISCONTINUED | OUTPATIENT
Start: 2024-06-22 | End: 2024-06-24

## 2024-06-22 RX ORDER — SODIUM CHLORIDE 0.9 % (FLUSH) 0.9 %
10 SYRINGE (ML) INJECTION EVERY 12 HOURS SCHEDULED
Status: DISCONTINUED | OUTPATIENT
Start: 2024-06-22 | End: 2024-06-23 | Stop reason: SDUPTHER

## 2024-06-22 RX ORDER — ROSUVASTATIN CALCIUM 5 MG/1
5 TABLET, COATED ORAL ONCE
Status: COMPLETED | OUTPATIENT
Start: 2024-06-22 | End: 2024-06-22

## 2024-06-22 RX ORDER — UREA 10 %
5 LOTION (ML) TOPICAL NIGHTLY PRN
Status: DISCONTINUED | OUTPATIENT
Start: 2024-06-22 | End: 2024-06-24 | Stop reason: HOSPADM

## 2024-06-22 RX ORDER — LISINOPRIL 5 MG/1
10 TABLET ORAL EVERY EVENING
COMMUNITY

## 2024-06-22 RX ORDER — ONDANSETRON 2 MG/ML
4 INJECTION INTRAMUSCULAR; INTRAVENOUS EVERY 6 HOURS PRN
Status: DISCONTINUED | OUTPATIENT
Start: 2024-06-22 | End: 2024-06-23

## 2024-06-22 RX ORDER — AMLODIPINE BESYLATE 5 MG/1
10 TABLET ORAL ONCE
Status: DISCONTINUED | OUTPATIENT
Start: 2024-06-22 | End: 2024-06-22

## 2024-06-22 RX ORDER — SODIUM CHLORIDE 0.9 % (FLUSH) 0.9 %
10 SYRINGE (ML) INJECTION AS NEEDED
Status: DISCONTINUED | OUTPATIENT
Start: 2024-06-22 | End: 2024-06-24 | Stop reason: HOSPADM

## 2024-06-22 RX ORDER — SODIUM CHLORIDE 9 MG/ML
40 INJECTION, SOLUTION INTRAVENOUS AS NEEDED
Status: DISCONTINUED | OUTPATIENT
Start: 2024-06-22 | End: 2024-06-24 | Stop reason: HOSPADM

## 2024-06-22 RX ORDER — ASPIRIN 81 MG/1
81 TABLET ORAL ONCE
Status: COMPLETED | OUTPATIENT
Start: 2024-06-22 | End: 2024-06-22

## 2024-06-22 RX ORDER — METOPROLOL TARTRATE 50 MG/1
50 TABLET, FILM COATED ORAL ONCE
Status: COMPLETED | OUTPATIENT
Start: 2024-06-22 | End: 2024-06-22

## 2024-06-22 RX ORDER — LISINOPRIL 5 MG/1
5 TABLET ORAL ONCE
Status: COMPLETED | OUTPATIENT
Start: 2024-06-22 | End: 2024-06-22

## 2024-06-22 RX ADMIN — SODIUM CHLORIDE 100 ML/HR: 900 INJECTION, SOLUTION INTRAVENOUS at 21:31

## 2024-06-22 RX ADMIN — ROSUVASTATIN CALCIUM 5 MG: 5 TABLET, COATED ORAL at 22:36

## 2024-06-22 RX ADMIN — METOPROLOL TARTRATE 50 MG: 50 TABLET ORAL at 22:36

## 2024-06-22 RX ADMIN — SODIUM CHLORIDE 1000 ML: 9 INJECTION, SOLUTION INTRAVENOUS at 17:41

## 2024-06-22 RX ADMIN — Medication 10 ML: at 21:31

## 2024-06-22 RX ADMIN — LISINOPRIL 5 MG: 5 TABLET ORAL at 22:36

## 2024-06-22 RX ADMIN — ASPIRIN 81 MG: 81 TABLET, COATED ORAL at 22:36

## 2024-06-22 NOTE — ED NOTES
Pt in ER with c/o L knee pain after syncopal episode.  Pt reports he has been working outside all week and today had a syncopal episode and fell landing on L knee.  Pt reports hx of TLKA.  Pt reports pain with weight bearing.  Pt denies any CP, SOA, HA, fever or NVD.  Pt is A/O x4 and ambulatory.  No s/s of distress.  RR even and unlabored.

## 2024-06-23 ENCOUNTER — APPOINTMENT (OUTPATIENT)
Dept: CARDIOLOGY | Facility: HOSPITAL | Age: 64
End: 2024-06-23
Payer: MEDICARE

## 2024-06-23 ENCOUNTER — APPOINTMENT (OUTPATIENT)
Dept: CT IMAGING | Facility: HOSPITAL | Age: 64
End: 2024-06-23
Payer: MEDICARE

## 2024-06-23 PROBLEM — I65.21 RIGHT CAROTID ARTERY OCCLUSION: Status: ACTIVE | Noted: 2024-06-23

## 2024-06-23 PROBLEM — I65.22 LEFT CAROTID STENOSIS: Status: ACTIVE | Noted: 2024-06-23

## 2024-06-23 LAB
ALBUMIN SERPL-MCNC: 3.9 G/DL (ref 3.5–5.2)
ALP SERPL-CCNC: 55 U/L (ref 39–117)
ALT SERPL W P-5'-P-CCNC: 19 U/L (ref 1–41)
ANION GAP SERPL CALCULATED.3IONS-SCNC: 8.9 MMOL/L (ref 5–15)
AST SERPL-CCNC: 19 U/L (ref 1–40)
BASOPHILS # BLD AUTO: 0.05 10*3/MM3 (ref 0–0.2)
BASOPHILS NFR BLD AUTO: 0.9 % (ref 0–1.5)
BH CV VAS PRELIMINARY FINDINGS SCRIPTING: 1
BH CV XLRA MEAS LEFT DIST CCA EDV: -28.5 CM/SEC
BH CV XLRA MEAS LEFT DIST CCA PSV: -83.4 CM/SEC
BH CV XLRA MEAS LEFT DIST ICA EDV: -36.8 CM/SEC
BH CV XLRA MEAS LEFT DIST ICA PSV: -82.3 CM/SEC
BH CV XLRA MEAS LEFT ICA/CCA RATIO: -2.08
BH CV XLRA MEAS LEFT MID ICA EDV: -107 CM/SEC
BH CV XLRA MEAS LEFT MID ICA PSV: -270 CM/SEC
BH CV XLRA MEAS LEFT PROX CCA EDV: 33.8 CM/SEC
BH CV XLRA MEAS LEFT PROX CCA PSV: 130 CM/SEC
BH CV XLRA MEAS LEFT PROX ECA PSV: -101 CM/SEC
BH CV XLRA MEAS LEFT PROX ICA EDV: -29.2 CM/SEC
BH CV XLRA MEAS LEFT PROX ICA PSV: -80.1 CM/SEC
BH CV XLRA MEAS LEFT PROX SCLA PSV: 128 CM/SEC
BH CV XLRA MEAS LEFT VERTEBRAL A EDV: 22 CM/SEC
BH CV XLRA MEAS LEFT VERTEBRAL A PSV: 54 CM/SEC
BH CV XLRA MEAS RIGHT DIST CCA EDV: 0 CM/SEC
BH CV XLRA MEAS RIGHT DIST CCA PSV: 34.8 CM/SEC
BH CV XLRA MEAS RIGHT PROX CCA EDV: -7 CM/SEC
BH CV XLRA MEAS RIGHT PROX CCA PSV: -56.6 CM/SEC
BH CV XLRA MEAS RIGHT PROX ECA PSV: -70.2 CM/SEC
BH CV XLRA MEAS RIGHT PROX SCLA PSV: 143 CM/SEC
BH CV XLRA MEAS RIGHT VERTEBRAL A EDV: -21.1 CM/SEC
BH CV XLRA MEAS RIGHT VERTEBRAL A PSV: -62.7 CM/SEC
BILIRUB CONJ SERPL-MCNC: 0.1 MG/DL (ref 0–0.3)
BILIRUB INDIRECT SERPL-MCNC: 0.2 MG/DL
BILIRUB SERPL-MCNC: 0.3 MG/DL (ref 0–1.2)
BILIRUB UR QL STRIP: NEGATIVE
BUN SERPL-MCNC: 10 MG/DL (ref 8–23)
BUN/CREAT SERPL: 11.1 (ref 7–25)
CALCIUM SPEC-SCNC: 8.5 MG/DL (ref 8.6–10.5)
CHLORIDE SERPL-SCNC: 108 MMOL/L (ref 98–107)
CHOLEST SERPL-MCNC: 144 MG/DL (ref 0–200)
CK SERPL-CCNC: 110 U/L (ref 20–200)
CLARITY UR: CLEAR
CO2 SERPL-SCNC: 24.1 MMOL/L (ref 22–29)
COLOR UR: YELLOW
CREAT SERPL-MCNC: 0.9 MG/DL (ref 0.76–1.27)
DEPRECATED RDW RBC AUTO: 42.4 FL (ref 37–54)
EGFRCR SERPLBLD CKD-EPI 2021: 96.6 ML/MIN/1.73
EOSINOPHIL # BLD AUTO: 0.3 10*3/MM3 (ref 0–0.4)
EOSINOPHIL NFR BLD AUTO: 5.1 % (ref 0.3–6.2)
ERYTHROCYTE [DISTWIDTH] IN BLOOD BY AUTOMATED COUNT: 12.6 % (ref 12.3–15.4)
GLUCOSE SERPL-MCNC: 130 MG/DL (ref 65–99)
GLUCOSE UR STRIP-MCNC: NEGATIVE MG/DL
HBA1C MFR BLD: 7.15 % (ref 4.8–5.6)
HCT VFR BLD AUTO: 40.1 % (ref 37.5–51)
HDLC SERPL-MCNC: 30 MG/DL (ref 40–60)
HGB BLD-MCNC: 13.1 G/DL (ref 13–17.7)
HGB UR QL STRIP.AUTO: NEGATIVE
IMM GRANULOCYTES # BLD AUTO: 0.02 10*3/MM3 (ref 0–0.05)
IMM GRANULOCYTES NFR BLD AUTO: 0.3 % (ref 0–0.5)
KETONES UR QL STRIP: NEGATIVE
LDLC SERPL CALC-MCNC: 92 MG/DL (ref 0–100)
LDLC/HDLC SERPL: 2.99 {RATIO}
LEUKOCYTE ESTERASE UR QL STRIP.AUTO: NEGATIVE
LYMPHOCYTES # BLD AUTO: 2.62 10*3/MM3 (ref 0.7–3.1)
LYMPHOCYTES NFR BLD AUTO: 44.6 % (ref 19.6–45.3)
MAGNESIUM SERPL-MCNC: 2 MG/DL (ref 1.6–2.4)
MCH RBC QN AUTO: 29.9 PG (ref 26.6–33)
MCHC RBC AUTO-ENTMCNC: 32.7 G/DL (ref 31.5–35.7)
MCV RBC AUTO: 91.6 FL (ref 79–97)
MONOCYTES # BLD AUTO: 0.45 10*3/MM3 (ref 0.1–0.9)
MONOCYTES NFR BLD AUTO: 7.7 % (ref 5–12)
NEUTROPHILS NFR BLD AUTO: 2.43 10*3/MM3 (ref 1.7–7)
NEUTROPHILS NFR BLD AUTO: 41.4 % (ref 42.7–76)
NITRITE UR QL STRIP: NEGATIVE
NRBC BLD AUTO-RTO: 0 /100 WBC (ref 0–0.2)
NT-PROBNP SERPL-MCNC: 52.4 PG/ML (ref 0–900)
PH UR STRIP.AUTO: 7 [PH] (ref 5–8)
PLATELET # BLD AUTO: 226 10*3/MM3 (ref 140–450)
PMV BLD AUTO: 11.3 FL (ref 6–12)
POTASSIUM SERPL-SCNC: 3.8 MMOL/L (ref 3.5–5.2)
PROT SERPL-MCNC: 5.9 G/DL (ref 6–8.5)
PROT UR QL STRIP: NEGATIVE
RBC # BLD AUTO: 4.38 10*6/MM3 (ref 4.14–5.8)
SODIUM SERPL-SCNC: 141 MMOL/L (ref 136–145)
SP GR UR STRIP: 1.01 (ref 1–1.03)
T4 FREE SERPL-MCNC: 1.04 NG/DL (ref 0.93–1.7)
TRIGL SERPL-MCNC: 121 MG/DL (ref 0–150)
TROPONIN T SERPL HS-MCNC: 14 NG/L
TSH SERPL DL<=0.05 MIU/L-ACNC: 0.81 UIU/ML (ref 0.27–4.2)
UROBILINOGEN UR QL STRIP: NORMAL
VLDLC SERPL-MCNC: 22 MG/DL (ref 5–40)
WBC NRBC COR # BLD AUTO: 5.87 10*3/MM3 (ref 3.4–10.8)

## 2024-06-23 PROCEDURE — 80048 BASIC METABOLIC PNL TOTAL CA: CPT | Performed by: EMERGENCY MEDICINE

## 2024-06-23 PROCEDURE — 84443 ASSAY THYROID STIM HORMONE: CPT | Performed by: NURSE PRACTITIONER

## 2024-06-23 PROCEDURE — 83735 ASSAY OF MAGNESIUM: CPT | Performed by: NURSE PRACTITIONER

## 2024-06-23 PROCEDURE — 82550 ASSAY OF CK (CPK): CPT | Performed by: NURSE PRACTITIONER

## 2024-06-23 PROCEDURE — 70498 CT ANGIOGRAPHY NECK: CPT

## 2024-06-23 PROCEDURE — 99214 OFFICE O/P EST MOD 30 MIN: CPT | Performed by: INTERNAL MEDICINE

## 2024-06-23 PROCEDURE — 84484 ASSAY OF TROPONIN QUANT: CPT | Performed by: EMERGENCY MEDICINE

## 2024-06-23 PROCEDURE — 83036 HEMOGLOBIN GLYCOSYLATED A1C: CPT | Performed by: NURSE PRACTITIONER

## 2024-06-23 PROCEDURE — 93880 EXTRACRANIAL BILAT STUDY: CPT

## 2024-06-23 PROCEDURE — 83880 ASSAY OF NATRIURETIC PEPTIDE: CPT | Performed by: NURSE PRACTITIONER

## 2024-06-23 PROCEDURE — 93880 EXTRACRANIAL BILAT STUDY: CPT | Performed by: SURGERY

## 2024-06-23 PROCEDURE — 99204 OFFICE O/P NEW MOD 45 MIN: CPT | Performed by: SURGERY

## 2024-06-23 PROCEDURE — 25810000003 SODIUM CHLORIDE 0.9 % SOLUTION: Performed by: EMERGENCY MEDICINE

## 2024-06-23 PROCEDURE — 25510000001 IOPAMIDOL PER 1 ML: Performed by: EMERGENCY MEDICINE

## 2024-06-23 PROCEDURE — 81003 URINALYSIS AUTO W/O SCOPE: CPT | Performed by: NURSE PRACTITIONER

## 2024-06-23 PROCEDURE — 84439 ASSAY OF FREE THYROXINE: CPT | Performed by: NURSE PRACTITIONER

## 2024-06-23 PROCEDURE — G0378 HOSPITAL OBSERVATION PER HR: HCPCS

## 2024-06-23 PROCEDURE — 85025 COMPLETE CBC W/AUTO DIFF WBC: CPT | Performed by: EMERGENCY MEDICINE

## 2024-06-23 PROCEDURE — 70496 CT ANGIOGRAPHY HEAD: CPT

## 2024-06-23 PROCEDURE — 80076 HEPATIC FUNCTION PANEL: CPT | Performed by: NURSE PRACTITIONER

## 2024-06-23 PROCEDURE — 80061 LIPID PANEL: CPT | Performed by: EMERGENCY MEDICINE

## 2024-06-23 RX ORDER — SODIUM CHLORIDE 0.9 % (FLUSH) 0.9 %
10 SYRINGE (ML) INJECTION AS NEEDED
Status: DISCONTINUED | OUTPATIENT
Start: 2024-06-23 | End: 2024-06-24 | Stop reason: HOSPADM

## 2024-06-23 RX ORDER — AMLODIPINE BESYLATE 5 MG/1
10 TABLET ORAL EVERY EVENING
Status: DISCONTINUED | OUTPATIENT
Start: 2024-06-23 | End: 2024-06-24

## 2024-06-23 RX ORDER — SODIUM CHLORIDE 9 MG/ML
40 INJECTION, SOLUTION INTRAVENOUS AS NEEDED
Status: DISCONTINUED | OUTPATIENT
Start: 2024-06-23 | End: 2024-06-24 | Stop reason: HOSPADM

## 2024-06-23 RX ORDER — ONDANSETRON 4 MG/1
4 TABLET, ORALLY DISINTEGRATING ORAL EVERY 6 HOURS PRN
Status: DISCONTINUED | OUTPATIENT
Start: 2024-06-23 | End: 2024-06-24 | Stop reason: HOSPADM

## 2024-06-23 RX ORDER — POLYETHYLENE GLYCOL 3350 17 G/17G
17 POWDER, FOR SOLUTION ORAL DAILY PRN
Status: DISCONTINUED | OUTPATIENT
Start: 2024-06-23 | End: 2024-06-24 | Stop reason: HOSPADM

## 2024-06-23 RX ORDER — PANTOPRAZOLE SODIUM 40 MG/1
40 TABLET, DELAYED RELEASE ORAL
Status: DISCONTINUED | OUTPATIENT
Start: 2024-06-23 | End: 2024-06-24 | Stop reason: HOSPADM

## 2024-06-23 RX ORDER — LORAZEPAM 2 MG/ML
0.5 INJECTION INTRAMUSCULAR EVERY 6 HOURS PRN
Status: DISCONTINUED | OUTPATIENT
Start: 2024-06-23 | End: 2024-06-24 | Stop reason: HOSPADM

## 2024-06-23 RX ORDER — SODIUM CHLORIDE 0.9 % (FLUSH) 0.9 %
10 SYRINGE (ML) INJECTION EVERY 12 HOURS SCHEDULED
Status: DISCONTINUED | OUTPATIENT
Start: 2024-06-23 | End: 2024-06-24 | Stop reason: HOSPADM

## 2024-06-23 RX ORDER — ACETAMINOPHEN 325 MG/1
650 TABLET ORAL EVERY 4 HOURS PRN
Status: DISCONTINUED | OUTPATIENT
Start: 2024-06-23 | End: 2024-06-24 | Stop reason: HOSPADM

## 2024-06-23 RX ORDER — ASPIRIN 81 MG/1
81 TABLET ORAL NIGHTLY
Status: DISCONTINUED | OUTPATIENT
Start: 2024-06-23 | End: 2024-06-24 | Stop reason: HOSPADM

## 2024-06-23 RX ORDER — METHOCARBAMOL 500 MG/1
500 TABLET, FILM COATED ORAL EVERY 8 HOURS PRN
Status: DISCONTINUED | OUTPATIENT
Start: 2024-06-23 | End: 2024-06-24 | Stop reason: HOSPADM

## 2024-06-23 RX ORDER — CLOPIDOGREL BISULFATE 75 MG/1
75 TABLET ORAL DAILY
Status: DISCONTINUED | OUTPATIENT
Start: 2024-06-23 | End: 2024-06-24 | Stop reason: HOSPADM

## 2024-06-23 RX ORDER — NITROGLYCERIN 0.4 MG/1
0.4 TABLET SUBLINGUAL
Status: DISCONTINUED | OUTPATIENT
Start: 2024-06-23 | End: 2024-06-24 | Stop reason: HOSPADM

## 2024-06-23 RX ORDER — LORAZEPAM 0.5 MG/1
0.5 TABLET ORAL EVERY 6 HOURS PRN
Status: DISCONTINUED | OUTPATIENT
Start: 2024-06-23 | End: 2024-06-23

## 2024-06-23 RX ORDER — ACETAMINOPHEN 650 MG/1
650 SUPPOSITORY RECTAL EVERY 4 HOURS PRN
Status: DISCONTINUED | OUTPATIENT
Start: 2024-06-23 | End: 2024-06-24 | Stop reason: HOSPADM

## 2024-06-23 RX ORDER — METOPROLOL TARTRATE 50 MG/1
50 TABLET, FILM COATED ORAL 2 TIMES DAILY
Status: DISCONTINUED | OUTPATIENT
Start: 2024-06-23 | End: 2024-06-24 | Stop reason: HOSPADM

## 2024-06-23 RX ORDER — BISACODYL 5 MG/1
5 TABLET, DELAYED RELEASE ORAL DAILY PRN
Status: DISCONTINUED | OUTPATIENT
Start: 2024-06-23 | End: 2024-06-24 | Stop reason: HOSPADM

## 2024-06-23 RX ORDER — BISACODYL 10 MG
10 SUPPOSITORY, RECTAL RECTAL DAILY PRN
Status: DISCONTINUED | OUTPATIENT
Start: 2024-06-23 | End: 2024-06-24 | Stop reason: HOSPADM

## 2024-06-23 RX ORDER — ACETAMINOPHEN 160 MG/5ML
650 SOLUTION ORAL EVERY 4 HOURS PRN
Status: DISCONTINUED | OUTPATIENT
Start: 2024-06-23 | End: 2024-06-24 | Stop reason: HOSPADM

## 2024-06-23 RX ORDER — LISINOPRIL 5 MG/1
5 TABLET ORAL EVERY EVENING
Status: DISCONTINUED | OUTPATIENT
Start: 2024-06-23 | End: 2024-06-24

## 2024-06-23 RX ORDER — AMOXICILLIN 250 MG
2 CAPSULE ORAL 2 TIMES DAILY PRN
Status: DISCONTINUED | OUTPATIENT
Start: 2024-06-23 | End: 2024-06-24 | Stop reason: HOSPADM

## 2024-06-23 RX ORDER — ROSUVASTATIN CALCIUM 5 MG/1
5 TABLET, COATED ORAL NIGHTLY
Status: DISCONTINUED | OUTPATIENT
Start: 2024-06-23 | End: 2024-06-24

## 2024-06-23 RX ORDER — ONDANSETRON 2 MG/ML
4 INJECTION INTRAMUSCULAR; INTRAVENOUS EVERY 6 HOURS PRN
Status: DISCONTINUED | OUTPATIENT
Start: 2024-06-23 | End: 2024-06-24 | Stop reason: HOSPADM

## 2024-06-23 RX ADMIN — Medication 10 ML: at 08:50

## 2024-06-23 RX ADMIN — IOPAMIDOL 100 ML: 755 INJECTION, SOLUTION INTRAVENOUS at 18:32

## 2024-06-23 RX ADMIN — LISINOPRIL 5 MG: 5 TABLET ORAL at 16:57

## 2024-06-23 RX ADMIN — ROSUVASTATIN CALCIUM 5 MG: 5 TABLET, COATED ORAL at 20:35

## 2024-06-23 RX ADMIN — Medication 5 MG: at 20:33

## 2024-06-23 RX ADMIN — METOPROLOL TARTRATE 50 MG: 50 TABLET ORAL at 20:35

## 2024-06-23 RX ADMIN — ASPIRIN 81 MG: 81 TABLET, COATED ORAL at 20:34

## 2024-06-23 RX ADMIN — SODIUM CHLORIDE 100 ML/HR: 900 INJECTION, SOLUTION INTRAVENOUS at 20:35

## 2024-06-23 RX ADMIN — CLOPIDOGREL BISULFATE 75 MG: 75 TABLET ORAL at 15:16

## 2024-06-23 RX ADMIN — PANTOPRAZOLE SODIUM 40 MG: 40 TABLET, DELAYED RELEASE ORAL at 08:50

## 2024-06-23 RX ADMIN — Medication 10 ML: at 20:35

## 2024-06-23 RX ADMIN — SODIUM CHLORIDE 100 ML/HR: 900 INJECTION, SOLUTION INTRAVENOUS at 08:54

## 2024-06-23 RX ADMIN — METHOCARBAMOL 500 MG: 500 TABLET ORAL at 20:34

## 2024-06-23 NOTE — CONSULTS
CARDIOLOGY CONSULT:    Flaco Yan  1960  male  [id number]      Referring Provider: Hospitalist  Reason for Consultation: Syncope    Patient Care Team:  Mike Redd MD as PCP - General (Family Medicine)  Hubert Fuller MD as Consulting Physician (Cardiology)    Chief complaint syncope    Subjective .     History of present illness:  Flaco Yan is a 64 y.o. male with history of hypertension hyperlipidemia coronary disease status post coronary bypass surgery and morbid obesity with possible sleep apnea presented to the hospital with complaints of syncopal episode.  Patient states that has been working out in the hot sun and then had sudden onset of dizziness and passed out at the kitchen table.  No complaint of any chest pain or shortness of breath.  No palpitations swelling of the feet.  Patient has been taking his medicines regularly.  Patient was then admitted and ruled out for MI by EKG and enzymes.    Review of Systems   Constitutional: Negative for fever and malaise/fatigue.   HENT:  Negative for ear pain and nosebleeds.    Eyes:  Negative for blurred vision and double vision.   Cardiovascular:  Positive for syncope. Negative for chest pain, dyspnea on exertion and palpitations.   Respiratory:  Negative for cough and shortness of breath.    Skin:  Negative for rash.   Musculoskeletal:  Negative for joint pain.   Gastrointestinal:  Negative for abdominal pain, nausea and vomiting.   Neurological:  Negative for focal weakness and headaches.   Psychiatric/Behavioral:  Negative for depression. The patient is not nervous/anxious.    All other systems reviewed and are negative.      History  Past Medical History:   Diagnosis Date    Coronary artery disease     GERD (gastroesophageal reflux disease)     Hyperlipidemia     Hypertension     Obesity        Past Surgical History:   Procedure Laterality Date    CARDIAC CATHETERIZATION      CARDIAC SURGERY      CORONARY ARTERY BYPASS GRAFT      CORONARY  STENT PLACEMENT      FINGER SURGERY      TOTAL KNEE ARTHROPLASTY Left 2020    Procedure: LEFT TOTAL KNEE;  Surgeon: Fernie Peoples II, MD;  Location: [facility] MAIN OR;  Service: Orthopedics;  Laterality: Left;       Family History   Problem Relation Age of Onset    Stroke Mother     Heart disease Mother     Heart disease Father     Heart disease Sister        Social History     Tobacco Use    Smoking status: Former     Current packs/day: 0.00     Average packs/day: 1 pack/day for 20.0 years (20.0 ttl pk-yrs)     Types: Cigarettes     Start date: 1979     Quit date: 1999     Years since quittin.5     Passive exposure: Past    Smokeless tobacco: Never   Vaping Use    Vaping status: Never Used   Substance Use Topics    Alcohol use: No    Drug use: No        Medications Prior to Admission   Medication Sig Dispense Refill Last Dose    amLODIPine (NORVASC) 10 MG tablet Take 1 tablet by mouth Every Evening.   2024    aspirin 81 MG EC tablet Take 1 tablet by mouth Every Night.   2024    esomeprazole (nexIUM) 20 MG capsule Take 1 capsule by mouth Every Morning Before Breakfast.   2024    lisinopril (PRINIVIL,ZESTRIL) 5 MG tablet Take 1 tablet by mouth Every Evening.   2024    metoprolol tartrate (LOPRESSOR) 50 MG tablet TAKE 1 TABLET BY MOUTH TWICE A  tablet 3 2024    rosuvastatin (CRESTOR) 5 MG tablet TAKE ONE TABLET BY MOUTH ONCE NIGHTLY 90 tablet 2 2024       Allergies: Patient has no known allergies.    Scheduled Meds:[Held by provider] amLODIPine, 10 mg, Oral, Q PM  aspirin, 81 mg, Oral, Nightly  lisinopril, 5 mg, Oral, Q PM  [Held by provider] metoprolol tartrate, 50 mg, Oral, BID  pantoprazole, 40 mg, Oral, Q AM  rosuvastatin, 5 mg, Oral, Nightly  sodium chloride, 10 mL, Intravenous, Q12H      Continuous Infusions:sodium chloride, 100 mL/hr, Last Rate: 100 mL/hr (24 0854)      PRN Meds:.  acetaminophen **OR** acetaminophen **OR** acetaminophen     "senna-docusate sodium **AND** polyethylene glycol **AND** bisacodyl **AND** bisacodyl    melatonin    methocarbamol    nitroglycerin    ondansetron ODT **OR** ondansetron    sodium chloride    sodium chloride    sodium chloride    sodium chloride    Objective     VITAL SIGNS  Vitals:    06/22/24 2038 06/23/24 0209 06/23/24 0500 06/23/24 1016   BP: 145/94 111/83 115/70 142/92   BP Location: Left arm Right arm Right arm Left arm   Patient Position: Sitting Lying Lying Lying   Pulse: 73 70 64 72   Resp: 18 16 16 16   Temp: 97.6 °F (36.4 °C) 97.6 °F (36.4 °C) 97.6 °F (36.4 °C) 98.2 °F (36.8 °C)   TempSrc: Oral Oral Oral Oral   SpO2: 93% 97% 93% 96%   Weight: 134 kg (295 lb)      Height: 177.8 cm (70\")          Flowsheet Rows      Flowsheet Row First Filed Value   Admission Height 177.8 cm (70\") Documented at 06/22/2024 1551   Admission Weight 135 kg (296 lb 11.8 oz) Documented at 06/22/2024 1551             TELEMETRY: Sinus rhythm with nonspecific ST segment abnormality    Physical Exam:  Constitutional:       Appearance: Well-developed.   Eyes:      General: No scleral icterus.     Conjunctiva/sclera: Conjunctivae normal.      Pupils: Pupils are equal, round, and reactive to light.   HENT:      Head: Normocephalic and atraumatic.   Neck:      Vascular: No carotid bruit or JVD.   Pulmonary:      Effort: Pulmonary effort is normal.      Breath sounds: Normal breath sounds. No wheezing. No rales.   Cardiovascular:      Normal rate. Regular rhythm.   Pulses:     Intact distal pulses.   Abdominal:      General: Bowel sounds are normal.      Palpations: Abdomen is soft.   Musculoskeletal: Normal range of motion.      Cervical back: Normal range of motion and neck supple. Skin:     General: Skin is warm and dry.      Findings: No rash.   Neurological:      Mental Status: Alert.      Comments: No focal deficits          Results Review:   I reviewed the patient's new clinical results.  Lab Results (last 24 hours)       " Procedure Component Value Units Date/Time    Urinalysis With Culture If Indicated - Urine, Clean Catch [[id number]]  (Normal) Collected: 06/23/24 1020    Specimen: Urine, Clean Catch Updated: 06/23/24 1049     Color, UA Yellow     Appearance, UA Clear     pH, UA 7.0     Specific Gravity, UA 1.009     Glucose, UA Negative     Ketones, UA Negative     Bilirubin, UA Negative     Blood, UA Negative     Protein, UA Negative     Leuk Esterase, UA Negative     Nitrite, UA Negative     Urobilinogen, UA 0.2 E.U./dL    Narrative:      In absence of clinical symptoms, the presence of pyuria, bacteria, and/or nitrites on the urinalysis result does not correlate with infection.  Urine microscopic not indicated.    BNP [[id number]]  (Normal) Collected: 06/23/24 0455    Specimen: Blood Updated: 06/23/24 0939     proBNP 52.4 pg/mL     Narrative:      This assay is used as an aid in the diagnosis of individuals suspected of having heart failure. It can be used as an aid in the diagnosis of acute decompensated heart failure (ADHF) in patients presenting with signs and symptoms of ADHF to the emergency department (ED). In addition, NT-proBNP of <300 pg/mL indicates ADHF is not likely.    Age Range Result Interpretation  NT-proBNP Concentration (pg/mL:      <50             Positive            >450                   Gray                 300-450                    Negative             <300    50-75           Positive            >900                  Gray                300-900                  Negative            <300      >75             Positive            >1800                  Gray                300-1800                  Negative            <300    Lipid Panel [[id number]]  (Abnormal) Collected: 06/23/24 0455    Specimen: Blood Updated: 06/23/24 0820     Total Cholesterol 144 mg/dL      Triglycerides 121 mg/dL      HDL Cholesterol 30 mg/dL      LDL Cholesterol  92 mg/dL      VLDL Cholesterol 22 mg/dL      LDL/HDL Ratio 2.99     Narrative:      Cholesterol Reference Ranges  (U.S. Department of Health and Human Services ATP III Classifications)    Desirable          <200 mg/dL  Borderline High    200-239 mg/dL  High Risk          >240 mg/dL      Triglyceride Reference Ranges  (U.S. Department of Health and Human Services ATP III Classifications)    Normal           <150 mg/dL  Borderline High  150-199 mg/dL  High             200-499 mg/dL  Very High        >500 mg/dL    HDL Reference Ranges  (U.S. Department of Health and Human Services ATP III Classifications)    Low     <40 mg/dl (major risk factor for CHD)  High    >60 mg/dl ('negative' risk factor for CHD)        LDL Reference Ranges  (U.S. Department of Health and Human Services ATP III Classifications)    Optimal          <100 mg/dL  Near Optimal     100-129 mg/dL  Borderline High  130-159 mg/dL  High             160-189 mg/dL  Very High        >189 mg/dL    Hepatic Function Panel [[id number]]  (Abnormal) Collected: 06/23/24 0455    Specimen: Blood Updated: 06/23/24 0820     Total Protein 5.9 g/dL      Albumin 3.9 g/dL      ALT (SGPT) 19 U/L      AST (SGOT) 19 U/L      Alkaline Phosphatase 55 U/L      Total Bilirubin 0.3 mg/dL      Bilirubin, Direct 0.1 mg/dL      Bilirubin, Indirect 0.2 mg/dL      Comment: Unable to calculate       TSH [[id number]]  (Normal) Collected: 06/23/24 0455    Specimen: Blood Updated: 06/23/24 0820     TSH 0.813 uIU/mL     T4, Free [[id number]]  (Normal) Collected: 06/23/24 0455    Specimen: Blood Updated: 06/23/24 0820     Free T4 1.04 ng/dL     Magnesium [[id number]]  (Normal) Collected: 06/23/24 0455    Specimen: Blood Updated: 06/23/24 0820     Magnesium 2.0 mg/dL     CK [[id number]]  (Normal) Collected: 06/23/24 0455    Specimen: Blood Updated: 06/23/24 0820     Creatine Kinase 110 U/L     Hemoglobin A1c [[id number]]  (Abnormal) Collected: 06/23/24 0455    Specimen: Blood Updated: 06/23/24 0756     Hemoglobin A1C 7.15 %     Basic Metabolic Panel [[id number]]   (Abnormal) Collected: 06/23/24 0455    Specimen: Blood Updated: 06/23/24 0638     Glucose 130 mg/dL      BUN 10 mg/dL      Creatinine 0.90 mg/dL      Sodium 141 mmol/L      Potassium 3.8 mmol/L      Chloride 108 mmol/L      CO2 24.1 mmol/L      Calcium 8.5 mg/dL      BUN/Creatinine Ratio 11.1     Anion Gap 8.9 mmol/L      eGFR 96.6 mL/min/1.73     Narrative:      GFR Normal >60  Chronic Kidney Disease <60  Kidney Failure <15      High Sensitivity Troponin T [[id number]]  (Normal) Collected: 06/23/24 0455    Specimen: Blood Updated: 06/23/24 0638     HS Troponin T 14 ng/L     Narrative:      High Sensitive Troponin T Reference Range:  <14.0 ng/L- Negative Female for AMI  <22.0 ng/L- Negative Male for AMI  >=14 - Abnormal Female indicating possible myocardial injury.  >=22 - Abnormal Male indicating possible myocardial injury.   Clinicians would have to utilize clinical acumen, EKG, Troponin, and serial changes to determine if it is an Acute Myocardial Infarction or myocardial injury due to an underlying chronic condition.         CBC Auto Differential [[id number]]  (Abnormal) Collected: 06/23/24 0455    Specimen: Blood Updated: 06/23/24 0614     WBC 5.87 10*3/mm3      RBC 4.38 10*6/mm3      Hemoglobin 13.1 g/dL      Hematocrit 40.1 %      MCV 91.6 fL      MCH 29.9 pg      MCHC 32.7 g/dL      RDW 12.6 %      RDW-SD 42.4 fl      MPV 11.3 fL      Platelets 226 10*3/mm3      Neutrophil % 41.4 %      Lymphocyte % 44.6 %      Monocyte % 7.7 %      Eosinophil % 5.1 %      Basophil % 0.9 %      Immature Grans % 0.3 %      Neutrophils, Absolute 2.43 10*3/mm3      Lymphocytes, Absolute 2.62 10*3/mm3      Monocytes, Absolute 0.45 10*3/mm3      Eosinophils, Absolute 0.30 10*3/mm3      Basophils, Absolute 0.05 10*3/mm3      Immature Grans, Absolute 0.02 10*3/mm3      nRBC 0.0 /100 WBC     Comprehensive Metabolic Panel [[id number]]  (Abnormal) Collected: 06/22/24 3500    Specimen: Blood from Arm, Right Updated: 06/22/24 3239      Glucose 132 mg/dL      BUN 12 mg/dL      Creatinine 0.87 mg/dL      Sodium 139 mmol/L      Potassium 4.4 mmol/L      Chloride 104 mmol/L      CO2 23.9 mmol/L      Calcium 9.6 mg/dL      Total Protein 7.1 g/dL      Albumin 4.6 g/dL      ALT (SGPT) 21 U/L      AST (SGOT) 23 U/L      Alkaline Phosphatase 65 U/L      Total Bilirubin 0.5 mg/dL      Globulin 2.5 gm/dL      A/G Ratio 1.8 g/dL      BUN/Creatinine Ratio 13.8     Anion Gap 11.1 mmol/L      eGFR 97.6 mL/min/1.73     Narrative:      GFR Normal >60  Chronic Kidney Disease <60  Kidney Failure <15      Single High Sensitivity Troponin T [[id number]]  (Normal) Collected: 06/22/24 1740    Specimen: Blood from Arm, Right Updated: 06/22/24 1806     HS Troponin T 15 ng/L     Narrative:      High Sensitive Troponin T Reference Range:  <14.0 ng/L- Negative Female for AMI  <22.0 ng/L- Negative Male for AMI  >=14 - Abnormal Female indicating possible myocardial injury.  >=22 - Abnormal Male indicating possible myocardial injury.   Clinicians would have to utilize clinical acumen, EKG, Troponin, and serial changes to determine if it is an Acute Myocardial Infarction or myocardial injury due to an underlying chronic condition.         CK [[id number]]  (Normal) Collected: 06/22/24 1740    Specimen: Blood from Arm, Right Updated: 06/22/24 1806     Creatine Kinase 153 U/L     CBC & Differential [[id number]]  (Normal) Collected: 06/22/24 1740    Specimen: Blood from Arm, Right Updated: 06/22/24 1745    Narrative:      The following orders were created for panel order CBC & Differential.  Procedure                               Abnormality         Status                     ---------                               -----------         ------                     CBC Auto Differential[[id number]]        Normal              Final result                 Please view results for these tests on the individual orders.    CBC Auto Differential [[id number]]  (Normal) Collected: 06/22/24 1740     Specimen: Blood from Arm, Right Updated: 06/22/24 1745     WBC 6.81 10*3/mm3      RBC 4.83 10*6/mm3      Hemoglobin 14.5 g/dL      Hematocrit 43.9 %      MCV 90.9 fL      MCH 30.0 pg      MCHC 33.0 g/dL      RDW 12.5 %      RDW-SD 41.2 fl      MPV 10.4 fL      Platelets 266 10*3/mm3      Neutrophil % 52.7 %      Lymphocyte % 35.1 %      Monocyte % 7.8 %      Eosinophil % 3.4 %      Basophil % 0.7 %      Immature Grans % 0.3 %      Neutrophils, Absolute 3.59 10*3/mm3      Lymphocytes, Absolute 2.39 10*3/mm3      Monocytes, Absolute 0.53 10*3/mm3      Eosinophils, Absolute 0.23 10*3/mm3      Basophils, Absolute 0.05 10*3/mm3      Immature Grans, Absolute 0.02 10*3/mm3      nRBC 0.0 /100 WBC             Imaging Results (Last 24 Hours)       Procedure Component Value Units Date/Time    XR Chest 1 View [[id number]] Collected: 06/22/24 1841     Updated: 06/22/24 1844    Narrative:      XR CHEST 1 VW    Date of Exam: 6/22/2024 6:34 PM EDT    Indication: syncope    Comparison: 6/17/2020    Findings:  Prior sternotomy. Stable cardiomegaly. Clear lungs. No pneumothorax. No pleural effusion. Osseous structures appear intact.      Impression:      Impression:  Stable cardiomegaly without acute process.      Electronically Signed: Robinson Cabello MD    6/22/2024 6:42 PM EDT    Workstation ID: [id number]    XR Knee 4+ View Left [[id number]] Collected: 06/22/24 1835     Updated: 06/22/24 1843    Narrative:      XR KNEE 4+ VW LEFT    Date of Exam: 6/22/2024 6:34 PM EDT    Indication: fall now with swelling    Comparison: None available.    Findings:  Knee prosthesis in place. No periprosthetic fracture. Patellar resurfacing. No joint effusion. Several soft tissue calcifications overlie the popliteal fossa soft tissues largest measuring up to 1.4 cm.      Impression:      Impression:  1. Negative for fracture.  2. Intact left knee prosthesis.      Electronically Signed: Robinson Cabello MD    6/22/2024 6:41 PM EDT    Workstation ID: [id number]     CT Head Without Contrast [[id number]] Collected: 06/22/24 1753     Updated: 06/22/24 1759    Narrative:      CT HEAD WO CONTRAST    Date of Exam: 6/22/2024 5:52 PM EDT    Indication: syncope.    Comparison: CT head without contrast 6/7/2020    Technique: Axial CT images were obtained of the head without contrast administration.  Coronal reconstructions were performed.  Automated exposure control and iterative reconstruction methods were used.      Findings:  Negative for intracranial hemorrhage. No mass effect or midline shift. The ventricles and cortical sulci are normally configured. Gray-white matter differentiation maintained. Posterior fossa without acute abnormality. There is an area of hypodensity   measuring 13 x 10 mm involving the left medial cerebellum most compatible with a remote infarct (image 14). Globes are intact and symmetric. No retro-orbital abnormality. The mastoid air cells are well-aerated. There is no sinus fluid level. Calvarium   intact.      Impression:      Impression:  1. No acute intracranial abnormality.  2. Remote left cerebellar infarct.      Electronically Signed: Robinson Cabello MD    6/22/2024 5:56 PM EDT    Workstation ID: [id number]            EKG      I personally viewed and interpreted the patient's EKG/Telemetry data:    ECHOCARDIOGRAM:         STRESS MYOVIEW:  Results for orders placed during the hospital encounter of 06/17/20    Stress Test With Myocardial Perfusion One Day    Interpretation Summary  · Left ventricular ejection fraction is normal (Calculated EF = 59%).  · Myocardial perfusion imaging indicates a normal myocardial perfusion study with no evidence of ischemia.  · Impressions are consistent with a low risk study.       CARDIAC CATHETERIZATION:    No results found for this or any previous visit.       OTHER:         MDM      Syncope  Patient presents with dizziness and syncope and is ruled out for MI by EKG and enzymes  Patient probably has orthostatic hypotension  "and dehydration.  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  Will continue him on the current medications today.    Coronary artery disease  Patient is \"status post coronary bypass surgery x 3 vessels with a LIMA to LAD and a saphenous graft to the marginal branch and RCA and is currently stable without any angina.    Hypertension  Patient blood pressure is currently stable on medications and is being ruled out for orthostatic hypotension    Hyperlipidemia  Patient has been on statins and the lipid levels are followed by the primary care doctor.    I discussed the patients findings and my recommendations with patient and nurse    Hubert Fuller MD  06/23/24  13:11 EDT            "

## 2024-06-23 NOTE — CONSULTS
Name: Flaco Yan ADMIT: 2024   : 1960  PCP: Mike Redd MD    MRN: [id number] LOS: 0 days   AGE/SEX: 64 y.o. male  ROOM: [facility] 107/1     Consults  CC: Syncope  Subjective     History of Present Illness  64 y.o. male 64-year-old gent with a past medical history significant for cardiac disease who had an episode of syncope when standing in his kitchen yesterday.  He was admitted for observation and as part of the workup had a carotid ultrasound done that demonstrated a right ICA occlusion and elevated velocities on the left consistent with a greater than 70% stenosis.  For this reason, we were asked to see the patient.      Review of Systems    History Review Reviewed Comments   Past Medical History:  [x]  Coronary artery disease, obesity, GERD, hypertension, hyperlipidemia   Past Surgical History: [x]  CABG, left total knee   Family History: [x]  Early onset cardiovascular disease, mother  of a stroke   Social History: [x]  Patient is a former cigarette smoker but quit in      Scheduled Meds:     [Held by provider] amLODIPine, 10 mg, Oral, Q PM  aspirin, 81 mg, Oral, Nightly  clopidogrel, 75 mg, Oral, Daily  lisinopril, 5 mg, Oral, Q PM  metoprolol tartrate, 50 mg, Oral, BID  pantoprazole, 40 mg, Oral, Q AM  rosuvastatin, 5 mg, Oral, Nightly  sodium chloride, 10 mL, Intravenous, Q12H      IV Meds:   sodium chloride, 100 mL/hr, Last Rate: 100 mL/hr (24 0854)        Allergies: Patient has no known allergies.    Objective   Temp:  [97.6 °F (36.4 °C)-98.4 °F (36.9 °C)] 98.4 °F (36.9 °C)  Heart Rate:  [64-79] 75  Resp:  [14-18] 14  BP: (111-151)/(70-94) 126/73    I/O this shift:  In: 0   Out: 1600 [Urine:1600]    Physical Exam  Vitals reviewed.   Constitutional:       Appearance: He is well-developed. He is obese.   Eyes:      Conjunctiva/sclera: Conjunctivae normal.   Cardiovascular:      Rate and Rhythm: Normal rate.   Pulmonary:      Effort: Pulmonary effort is normal.    Abdominal:      Palpations: Abdomen is soft.      Tenderness: There is no abdominal tenderness.   Neurological:      Mental Status: He is alert and oriented to person, place, and time.     No carotid incisions.  Short stocky neck.  Cervical mobility is reasonable.  Radial pulses are palpable.  Pedal pulses are palpable.      Data Reviewed:  CBC    Results from last 7 days   Lab Units 06/23/24  0455 06/22/24  1740   WBC 10*3/mm3 5.87 6.81   HEMOGLOBIN g/dL 13.1 14.5   PLATELETS 10*3/mm3 226 266     BMP   Results from last 7 days   Lab Units 06/23/24  0455 06/22/24  1740   SODIUM mmol/L 141 139   POTASSIUM mmol/L 3.8 4.4   CHLORIDE mmol/L 108* 104   CO2 mmol/L 24.1 23.9   BUN mg/dL 10 12   CREATININE mg/dL 0.90 0.87   GLUCOSE mg/dL 130* 132*   MAGNESIUM mg/dL 2.0  --      HbA1C   Lab Results   Component Value Date    HGBA1C 7.15 (H) 06/23/2024    HGBA1C 6.10 (H) 12/13/2023    HGBA1C 6.50 (H) 06/12/2023     Radiology(recent) XR Chest 1 View    Result Date: 6/22/2024  Impression: Stable cardiomegaly without acute process. Electronically Signed: Robinson Cabello MD  6/22/2024 6:42 PM EDT  Workstation ID: [id number]    XR Knee 4+ View Left    Result Date: 6/22/2024  Impression: 1. Negative for fracture. 2. Intact left knee prosthesis. Electronically Signed: Robinson Cabello MD  6/22/2024 6:41 PM EDT  Workstation ID: [id number]    CT Head Without Contrast    Result Date: 6/22/2024  Impression: 1. No acute intracranial abnormality. 2. Remote left cerebellar infarct. Electronically Signed: Robinson Cabello MD  6/22/2024 5:56 PM EDT  Workstation ID: [id number]     Labs significant for: Hemoglobin A1c of 7.15.  Hemoglobin of 13.  Creatinine less than 1.    Imaging Studies:  I reviewed a previous CT scan of his head done in 2020 but this did not include any of the neck and did not have any acute findings.  I reviewed and look for previous carotid ultrasounds but could not find any in the epic system.    Active Hospital Problems    Diagnosis   POA    **Syncope [R55]  Yes    Right carotid artery occlusion [I65.21]  Yes    Left carotid stenosis [I65.22]  Yes      Resolved Hospital Problems   No resolved problems to display.       Data Points:  During this visit the following were done:  Labs Reviewed [x]  []  []  Labs Ordered []  []  []  Radiology Reports Reviewed [x]    Radiology Ordered [x]    EKG, echo, and/or stress test reviewed []    Vascular lab results reviewed  [x]    Vascular lab images reviewed and interpreted per myself   []    Referring Provider Records Reviewed []    ER Records Reviewed []    Hospital Records Reviewed/Summarized [x]    History Obtained From Family []    Radiological images view and Interpreted per myself [x]    Case Discussed with referring provider [x]     Decision to obtain and request outside records  [x]        Active Hospital Problems:   Active Hospital Problems    Diagnosis  POA    **Syncope [R55]  Yes    Right carotid artery occlusion [I65.21]  Yes    Left carotid stenosis [I65.22]  Yes      Resolved Hospital Problems   No resolved problems to display.      Assessment & Plan   Assessment / Plan     64 y.o. male who was admitted to the observation unit after he presented for evaluation for syncope.  The patient's medical records were reviewed.  In summary, he has a history of obesity and early onset coronary artery disease and ultimately had a CABG several years ago by Dr. Mujica.  He reports that he was in the kitchen making something for his grandson and then found himself passed out onto the floor.  When he awoke, he felt neurologically normal.  This prompted emergency room evaluation.  He has been seen by cardiology and a carotid duplex scan was ordered.  I reviewed this study and he appears to have no flow in the right internal carotid artery.  Velocities on the left are elevated and consistent with a greater than 70% stenosis.  Vertebral artery flow appears to be antegrade.  On exam, he is obese.  He is able provide  a good history.  He does not have any cervical incisions.  Radial pulses are palpable.  Pedal pulses are palpable.  He does not have any focal neurologic deficits.    I went back and tried to find historical records and reviewed as much as I could see in epic and under the media tab.  I began to see that the patient was seen in 2019 or 2020 for some left-sided numbness and tingling and this was basically worked up as a possible neuropathy.  He had some cervical spine films and MRI done as part of that workup.  There was a head CT at that time but nothing of the neck.  No carotid duplex scan done in our current system although almost certainly 1 was done many years ago prior to his CABG.  The patient does not recall any abnormalities that he was made aware of with regards to his carotids.    Syncope would be a bit of an unusual presentation for stroke but given right occlusion and left side stenosis that is a possibility.  It is also possible that this episode a few years ago may have been a TIA or CVA and for now I cannot exclude that possibility.  I recommended that we get a CT angiogram of the head and neck and a stroke neurology to see him.  He should be on aspirin and statin therapy at the least which is already the case because of his cardiac disease.  Based on the studies we can make further recommendations.  I discussed with the patient that if indeed his right ICA is occluded whether acutely or chronically we would not do anything to try to open it up.  However, if he does have a severe stenosis on the left side intervention could be considered.  I wondered whether or not an MRI is appropriate but he has claustrophobia so I will not order that but asked that stroke neurology see him and then we can determine how important that is as part of his overall workup and management.    I discussed the patients findings and my recommendations with patient and consulting provider.  Please call my office with any  question: [phone]

## 2024-06-23 NOTE — ED PROVIDER NOTES
Subjective   History of Present Illness  64-year-old male had the onset of syncopal episode today.  The patient states that he has had extensive heat exposure all week long not had a lot of oral intake.  He states that that he frequently drinks Diet Coke.  He reports that he has had some recent decrease in exercise tolerance as well as some chest tightness.  He reports no shortness of breath.  He reports no nausea or diaphoresis prior to the syncopal episode he also denies palpitations prior to syncopal episodes.  The patient reports that he has had no vomiting or diarrhea.  He states that he occasionally has palpitations      Review of Systems   Cardiovascular:  Positive for palpitations.   Musculoskeletal:  Positive for joint swelling.   Neurological:  Negative for seizures and numbness.   Hematological:  Does not bruise/bleed easily.   All other systems reviewed and are negative.      Past Medical History:   Diagnosis Date    Coronary artery disease     GERD (gastroesophageal reflux disease)     Hyperlipidemia     Hypertension     Obesity      Date of last stress test is unknown patient thinks it was done in the cardiologist office  No Known Allergies    Past Surgical History:   Procedure Laterality Date    CARDIAC CATHETERIZATION      CARDIAC SURGERY      CORONARY ARTERY BYPASS GRAFT      CORONARY STENT PLACEMENT      FINGER SURGERY      TOTAL KNEE ARTHROPLASTY Left 6/19/2020    Procedure: LEFT TOTAL KNEE;  Surgeon: Fernie Peoples II, MD;  Location: [facility];  Service: Orthopedics;  Laterality: Left;   Coronary artery bypass grafting in 2014    Family History   Problem Relation Age of Onset    Stroke Mother     Heart disease Mother     Heart disease Father     Heart disease Sister        Social History     Socioeconomic History    Marital status: Single   Tobacco Use    Smoking status: Former     Current packs/day: 0.00     Average packs/day: 1 pack/day for 20.0 years (20.0 ttl pk-yrs)     Types:  Cigarettes     Start date: 1979     Quit date: 1999     Years since quittin.5     Passive exposure: Past    Smokeless tobacco: Never   Vaping Use    Vaping status: Never Used   Substance and Sexual Activity    Alcohol use: No    Drug use: No    Sexual activity: Defer     Reports no unusual food water travel or activity      Objective   Physical Exam  Alert Jonatan Coma Scale 15   HEENT: Pupils equal and reactive to light. Conjunctivae are not injected. Normal tympanic membranes. Oropharynx and nares are normal.   Neck: Supple. Midline trachea. No JVD. No goiter.   Chest: Clear and equal breath sounds bilaterally, regular rate and rhythm without murmur or rub.   Abdomen: Positive bowel sounds, nontender, nondistended. No rebound or peritoneal signs. No CVA tenderness.   Extremities no clubbing. cyanosis or edema. Motor sensory exam is normal. The full range of motion is intact   Skin: Warm and dry, no rashes or petechia.   Lymphatic: No regional lymphadenopathy. No calf pain, swelling or Homans sign    Procedures           ED Course                      Labs Reviewed   COMPREHENSIVE METABOLIC PANEL - Abnormal; Notable for the following components:       Result Value    Glucose 132 (*)     All other components within normal limits    Narrative:     GFR Normal >60  Chronic Kidney Disease <60  Kidney Failure <15     SINGLE HS TROPONIN T - Normal    Narrative:     High Sensitive Troponin T Reference Range:  <14.0 ng/L- Negative Female for AMI  <22.0 ng/L- Negative Male for AMI  >=14 - Abnormal Female indicating possible myocardial injury.  >=22 - Abnormal Male indicating possible myocardial injury.   Clinicians would have to utilize clinical acumen, EKG, Troponin, and serial changes to determine if it is an Acute Myocardial Infarction or myocardial injury due to an underlying chronic condition.        CK - Normal   CBC WITH AUTO DIFFERENTIAL - Normal   CBC AND DIFFERENTIAL    Narrative:     The following  orders were created for panel order CBC & Differential.  Procedure                               Abnormality         Status                     ---------                               -----------         ------                     CBC Auto Differential[[id number]]        Normal              Final result                 Please view results for these tests on the individual orders.     Medications   sodium chloride 0.9 % bolus 1,000 mL (0 mL Intravenous Stopped 6/22/24 1945)     XR Chest 1 View    Result Date: 6/22/2024  Impression: Stable cardiomegaly without acute process. Electronically Signed: Robinson Cabello MD  6/22/2024 6:42 PM EDT  Workstation ID: [id number]    XR Knee 4+ View Left    Result Date: 6/22/2024  Impression: 1. Negative for fracture. 2. Intact left knee prosthesis. Electronically Signed: Robinson Cabello MD  6/22/2024 6:41 PM EDT  Workstation ID: [id number]    CT Head Without Contrast    Result Date: 6/22/2024  Impression: 1. No acute intracranial abnormality. 2. Remote left cerebellar infarct. Electronically Signed: Robinson Cabello MD  6/22/2024 5:56 PM EDT  Workstation ID: [id number]                            Medical Decision Making  Patient felt somewhat better with ER therapy was no chest pain or palpitations identified EKG shows progression of first-degree AV block.  The patient will be admitted will obtain cardiology evaluation the morning the patient was agreeable to this plan of treatment patient has serial troponin and EKG patient will have follow-up BMP after hydration overnight    Amount and/or Complexity of Data Reviewed  Labs: ordered. Decision-making details documented in ED Course.  Radiology: ordered and independent interpretation performed.  ECG/medicine tests: ordered and independent interpretation performed.     Details: Sinus rhythm with first-degree AV block with OH duration increased since comparison of 6/7/2020.  The comparison EKG also showed first-degree AV block and sinus  rhythm    Risk  OTC drugs.  Prescription drug management.  Decision regarding hospitalization.        Final diagnoses:   Syncope and collapse   Contusion of left knee, initial encounter   1st degree AV block   Heat exhaustion, initial encounter   Coronary artery disease involving native coronary artery of native heart, unspecified whether angina present   Hx of CABG       ED Disposition  ED Disposition       ED Disposition   Decision to Admit    Condition   --    Comment   --               No follow-up provider specified.       Medication List      No changes were made to your prescriptions during this visit.            Aaron Dietz MD  06/22/24 2005

## 2024-06-23 NOTE — ED NOTES
Nursing report ED to floor  Flaco Yan  64 y.o.  male    HPI:   Chief Complaint   Patient presents with    Syncope     Pt has been working outside all week and had a syncopal episode today.  Pt hit knee when fell and has pain to left knee when he fell.          Admitting doctor:   Aaron Dietz MD    Admitting diagnosis:   The primary encounter diagnosis was Syncope and collapse. Diagnoses of Contusion of left knee, initial encounter, 1st degree AV block, Heat exhaustion, initial encounter, Coronary artery disease involving native coronary artery of native heart, unspecified whether angina present, and Hx of CABG were also pertinent to this visit.    Code status:   Current Code Status       Date Active Code Status Order ID Comments User Context       Prior            Allergies:   Patient has no known allergies.    Isolation:  No active isolations     Fall Risk:  Fall Risk Assessment was completed, and patient is at high risk for falls.   Predictive Model Details         5 (Low) Factor Value    Calculated 6/22/2024 20:21 Age 64    Risk of Fall Model Active Peripheral IV Present     Imaging order in this encounter Present     Respiratory Rate 18     Magnesium not on file     Narayan Scale not on file     Diastolic BP 74     Clinically Relevant Sex Not Female     Number of Distinct Medication Classes administered 1     Total Bilirubin 0.5 mg/dL     Potassium 4.4 mmol/L     Days after Admission 0.131     Creatinine 0.87 mg/dL     Tobacco Use Quit     ALT 21 U/L     Calcium 9.6 mg/dL     Chloride 104 mmol/L     Albumin 4.6 g/dL         Weight:       06/22/24  1551   Weight: 135 kg (296 lb 11.8 oz)       Intake and Output  No intake or output data in the 24 hours ending 06/22/24 2021    Diet:        Most recent vitals:   Vitals:    06/22/24 1901 06/22/24 1916 06/22/24 1931 06/22/24 1946   BP: 147/86 148/90 132/85 130/74   BP Location:       Patient Position:       Pulse: 69 71 69 71   Resp:       Temp:        TempSrc:       SpO2: 95% 93% 95% 95%   Weight:       Height:           Active LDAs/IV Access:   Lines, Drains & Airways       Active LDAs       Name Placement date Placement time Site Days    Peripheral IV 06/22/24 1741 Anterior;Proximal;Right Forearm 06/22/24 1741  Forearm  less than 1                    Skin Condition:   Skin Assessments (last day)       None             Labs (abnormal labs have a star):   Labs Reviewed   COMPREHENSIVE METABOLIC PANEL - Abnormal; Notable for the following components:       Result Value    Glucose 132 (*)     All other components within normal limits    Narrative:     GFR Normal >60  Chronic Kidney Disease <60  Kidney Failure <15     SINGLE HS TROPONIN T - Normal    Narrative:     High Sensitive Troponin T Reference Range:  <14.0 ng/L- Negative Female for AMI  <22.0 ng/L- Negative Male for AMI  >=14 - Abnormal Female indicating possible myocardial injury.  >=22 - Abnormal Male indicating possible myocardial injury.   Clinicians would have to utilize clinical acumen, EKG, Troponin, and serial changes to determine if it is an Acute Myocardial Infarction or myocardial injury due to an underlying chronic condition.        CK - Normal   CBC WITH AUTO DIFFERENTIAL - Normal   CBC AND DIFFERENTIAL    Narrative:     The following orders were created for panel order CBC & Differential.  Procedure                               Abnormality         Status                     ---------                               -----------         ------                     CBC Auto Differential[[id number]]        Normal              Final result                 Please view results for these tests on the individual orders.       LOC: Person, Place, Time, and Situation    Telemetry:  Observation Unit    Cardiac Monitoring Ordered: yes    EKG:   ECG 12 Lead Syncope   Preliminary Result   HEART RATE= 72  bpm   RR Interval= 832  ms   GA Interval= 222  ms   P Horizontal Axis= 24  deg   P Front Axis= -4  deg    QRSD Interval= 108  ms   QT Interval= 396  ms   QTcB= 434  ms   QRS Axis= 2  deg   T Wave Axis= 39  deg   - ABNORMAL ECG -   Sinus rhythm   Prolonged WV interval   Electronically Signed By:    Date and Time of Study: 2024 16:27:29          Medications Given in the ED:   Medications   sodium chloride 0.9 % bolus 1,000 mL (0 mL Intravenous Stopped 24)       Imaging results:  XR Chest 1 View    Result Date: 2024  Impression: Stable cardiomegaly without acute process. Electronically Signed: Robinson Cabello MD  2024 6:42 PM EDT  Workstation ID: [id number]    XR Knee 4+ View Left    Result Date: 2024  Impression: 1. Negative for fracture. 2. Intact left knee prosthesis. Electronically Signed: Robinson Cabello MD  2024 6:41 PM EDT  Workstation ID: [id number]    CT Head Without Contrast    Result Date: 2024  Impression: 1. No acute intracranial abnormality. 2. Remote left cerebellar infarct. Electronically Signed: Robinson Cabello MD  2024 5:56 PM EDT  Workstation ID: [id number]     Social issues:   Social History     Socioeconomic History    Marital status: Single   Tobacco Use    Smoking status: Former     Current packs/day: 0.00     Average packs/day: 1 pack/day for 20.0 years (20.0 ttl pk-yrs)     Types: Cigarettes     Start date: 1979     Quit date: 1999     Years since quittin.5     Passive exposure: Past    Smokeless tobacco: Never   Vaping Use    Vaping status: Never Used   Substance and Sexual Activity    Alcohol use: No    Drug use: No    Sexual activity: Defer       NIH Stroke Scale:  Interval: (not recorded)  1a. Level of Consciousness: (not recorded)  1b. LOC Questions: (not recorded)  1c. LOC Commands: (not recorded)  2. Best Gaze: (not recorded)  3. Visual: (not recorded)  4. Facial Palsy: (not recorded)  5a. Motor Arm, Left: (not recorded)  5b. Motor Arm, Right: (not recorded)  6a. Motor Leg, Left: (not recorded)  6b. Motor Leg, Right: (not recorded)  7. Limb  Ataxia: (not recorded)  8. Sensory: (not recorded)  9. Best Language: (not recorded)  10. Dysarthria: (not recorded)  11. Extinction and Inattention (formerly Neglect): (not recorded)    Total (NIH Stroke Scale): (not recorded)     Additional notable assessment information:N/A     Nursing report ED to floor:  MALORIE Julian RN   06/22/24 20:21 EDT

## 2024-06-23 NOTE — H&P
Formerly [facility] H&P    Patient Name: Flaco Yan  : 1960  MRN: [id number]  Primary Care Physician: Mike Redd MD  Date of admission: 2024     Patient Care Team:  Mike Redd MD as PCP - General (Family Medicine)  Hubert Fuller MD as Consulting Physician (Cardiology)          Subjective   History Present Illness     Chief Complaint:   Chief Complaint   Patient presents with    Syncope     Pt has been working outside all week and had a syncopal episode today.  Pt hit knee when fell and has pain to left knee when he fell.        Syncope     Mr. Yan is a 64 y.o.  presents to [facility] complaining of syncope       History of Present Illness    ED 24: 64-year-old male had the onset of syncopal episode today. The patient states that he has had extensive heat exposure all week long not had a lot of oral intake. He states that that he frequently drinks Diet Coke. He reports that he has had some recent decrease in exercise tolerance as well as some chest tightness. He reports no shortness of breath. He reports no nausea or diaphoresis prior to the syncopal episode he also denies palpitations prior to syncopal episodes. The patient reports that he has had no vomiting or diarrhea. He states that he occasionally has palpitations.    Observation 24: Is a 64-year-old male presenting to the hospital after syncopal episode at home.  Patient states he has been outside with extensive heat exposure and decreased oral intake.  Patient has reported some decrease in exercise tolerance but denies chest pain or shortness of breath at this time.  Patient denies fever, nausea vomiting headache change in vision or dizziness.  Patient reports losing consciousness for unknown amount of time during syncopal episode.  Patient denies hitting his head but does report hitting left knee.  Patient denies history of seizures or strokes.  Denies drug or alcohol use.    Review of Systems    Constitutional: Positive for malaise/fatigue.   HENT: Negative.     Eyes: Negative.    Cardiovascular:  Positive for syncope. Negative for chest pain and dyspnea on exertion.   Respiratory: Negative.  Negative for shortness of breath.    Endocrine: Negative.    Hematologic/Lymphatic: Negative.    Skin: Negative.    Musculoskeletal:  Positive for joint pain.   Gastrointestinal: Negative.    Genitourinary: Negative.    Neurological:  Positive for weakness. Negative for light-headedness.   Psychiatric/Behavioral: Negative.     Allergic/Immunologic: Negative.            Personal History     Past Medical History:   Past Medical History:   Diagnosis Date    Coronary artery disease     GERD (gastroesophageal reflux disease)     Hyperlipidemia     Hypertension     Obesity        Surgical History:      Past Surgical History:   Procedure Laterality Date    CARDIAC CATHETERIZATION      CARDIAC SURGERY      CORONARY ARTERY BYPASS GRAFT      CORONARY STENT PLACEMENT      FINGER SURGERY      TOTAL KNEE ARTHROPLASTY Left 6/19/2020    Procedure: LEFT TOTAL KNEE;  Surgeon: Fernie Peoples II, MD;  Location: [facility];  Service: Orthopedics;  Laterality: Left;           Family History: family history includes Heart disease in his father, mother, and sister; Stroke in his mother. Otherwise pertinent FHx was reviewed and unremarkable.     Social History:  reports that he quit smoking about 24 years ago. His smoking use included cigarettes. He started smoking about 44 years ago. He has a 20 pack-year smoking history. He has been exposed to tobacco smoke. He has never used smokeless tobacco. He reports that he does not drink alcohol and does not use drugs.      Medications:  Prior to Admission medications    Medication Sig Start Date End Date Taking? Authorizing Provider   amLODIPine (NORVASC) 10 MG tablet Take 1 tablet by mouth Every Evening.   Yes Provider, MD Wilfred   aspirin 81 MG EC tablet Take 1 tablet by mouth  Every Night. 2/5/14  Yes Wilfrde Alcocer MD   esomeprazole (nexIUM) 20 MG capsule Take 1 capsule by mouth Every Morning Before Breakfast. 9/21/15  Yes Wilfred Alcocer MD   lisinopril (PRINIVIL,ZESTRIL) 5 MG tablet Take 1 tablet by mouth Every Evening.   Yes Wilfred Alcocer MD   metoprolol tartrate (LOPRESSOR) 50 MG tablet TAKE 1 TABLET BY MOUTH TWICE A DAY 3/13/24  Yes Mike Redd MD   rosuvastatin (CRESTOR) 5 MG tablet TAKE ONE TABLET BY MOUTH ONCE NIGHTLY 12/12/23  Yes Mike Redd MD       Allergies:  No Known Allergies    Objective   Objective     Vital Signs  Temp:  [97.6 °F (36.4 °C)-98.4 °F (36.9 °C)] 98.4 °F (36.9 °C)  Heart Rate:  [64-98] 75  Resp:  [14-18] 14  BP: (111-151)/(70-94) 126/73  SpO2:  [92 %-98 %] 92 %  on   ;   Device (Oxygen Therapy): room air  Body mass index is 42.33 kg/m².    Physical Exam  Vitals and nursing note reviewed.   Constitutional:       Appearance: Normal appearance.   HENT:      Head: Normocephalic and atraumatic.      Right Ear: External ear normal.      Left Ear: External ear normal.      Nose: Nose normal.      Mouth/Throat:      Pharynx: Oropharynx is clear.   Eyes:      Extraocular Movements: Extraocular movements intact.      Conjunctiva/sclera: Conjunctivae normal.      Pupils: Pupils are equal, round, and reactive to light.   Neck:      Vascular: Carotid bruit present.   Cardiovascular:      Rate and Rhythm: Normal rate and regular rhythm.      Pulses: Normal pulses.      Heart sounds: Normal heart sounds.   Pulmonary:      Effort: Pulmonary effort is normal.      Breath sounds: Normal breath sounds.   Abdominal:      General: Bowel sounds are normal.      Palpations: Abdomen is soft.   Musculoskeletal:         General: Normal range of motion.   Skin:     General: Skin is warm.      Capillary Refill: Capillary refill takes less than 2 seconds.   Neurological:      Mental Status: He is alert and oriented to person, place, and time.    Psychiatric:         Mood and Affect: Mood normal.         Behavior: Behavior normal.         Thought Content: Thought content normal.         Judgment: Judgment normal.           Results Review:  I have personally reviewed most recent cardiac tracings, lab results, microbiology results, and radiology images and interpretations and agree with findings, most notably: CBC, CMP, BNP, carotid duplex, EKG.    Results from last 7 days   Lab Units 06/23/24  0455   WBC 10*3/mm3 5.87   HEMOGLOBIN g/dL 13.1   HEMATOCRIT % 40.1   PLATELETS 10*3/mm3 226     Results from last 7 days   Lab Units 06/23/24  0455 06/22/24  1740   SODIUM mmol/L 141 139   POTASSIUM mmol/L 3.8 4.4   CHLORIDE mmol/L 108* 104   CO2 mmol/L 24.1 23.9   BUN mg/dL 10 12   CREATININE mg/dL 0.90 0.87   GLUCOSE mg/dL 130* 132*   CALCIUM mg/dL 8.5* 9.6   ALK PHOS U/L 55 65   ALT (SGPT) U/L 19 21   AST (SGOT) U/L 19 23   HSTROP T ng/L 14 15   PROBNP pg/mL 52.4  --      Estimated Creatinine Clearance: 117.2 mL/min (by C-G formula based on SCr of 0.9 mg/dL).  Brief Urine Lab Results  (Last result in the past 365 days)        Color   Clarity   Blood   Leuk Est   Nitrite   Protein   CREAT   Urine HCG        06/23/24 1020 Yellow   Clear   Negative   Negative   Negative   Negative                   Microbiology Results (last 10 days)       ** No results found for the last 240 hours. **            ECG/EMG Results (most recent)       Procedure Component Value Units Date/Time    ECG 12 Lead Syncope [[id number]] Collected: 06/22/24 1627     Updated: 06/22/24 1628     QT Interval 396 ms      QTC Interval 434 ms     Narrative:      HEART RATE= 72  bpm  RR Interval= 832  ms  KS Interval= 222  ms  P Horizontal Axis= 24  deg  P Front Axis= -4  deg  QRSD Interval= 108  ms  QT Interval= 396  ms  QTcB= 434  ms  QRS Axis= 2  deg  T Wave Axis= 39  deg  - ABNORMAL ECG -  Sinus rhythm  Prolonged KS interval  Electronically Signed By:   Date and Time of Study: 2024-06-22 16:27:29             Results for orders placed during the hospital encounter of 06/22/24    Duplex Carotid Ultrasound CAR    Interpretation Summary    Right internal carotid artery is occluded.    Left internal carotid artery demonstrates greater than 70% stenosis but less than near occlusion.          XR Chest 1 View    Result Date: 6/22/2024  Impression: Stable cardiomegaly without acute process. Electronically Signed: Robinson Cabello MD  6/22/2024 6:42 PM EDT  Workstation ID: [id number]    XR Knee 4+ View Left    Result Date: 6/22/2024  Impression: 1. Negative for fracture. 2. Intact left knee prosthesis. Electronically Signed: Robinson Cabello MD  6/22/2024 6:41 PM EDT  Workstation ID: [id number]    CT Head Without Contrast    Result Date: 6/22/2024  Impression: 1. No acute intracranial abnormality. 2. Remote left cerebellar infarct. Electronically Signed: Robinson Cabello MD  6/22/2024 5:56 PM EDT  Workstation ID: [id number]       Estimated Creatinine Clearance: 117.2 mL/min (by C-G formula based on SCr of 0.9 mg/dL).    Assessment & Plan   Assessment/Plan       Active Hospital Problems    Diagnosis  POA    **Syncope [R55]  Yes      Resolved Hospital Problems   No resolved problems to display.     Syncope        Lab Results   Component Value Date     TROPONINT 14 06/23/2024     TROPONINT 15 06/22/2024     TROPONINT <0.010 06/07/2020   -BNP 52.4, CK1 10  -Chest X-ray: Stable cardiomegaly without acute process  -X-ray of left knee shows negative fracture with intact left knee prosthesis  -EKG: Sinus rhythm with prolonged SD interval of 2 and 22 ms  -Urinalysis unremarkable   -CT head: No acute intercranial process with remote left cerebral infarct  -Telemetry  -Start ASA and Plavix   -Carotid duplex: occlusion to right ICA, greater than 70% but less than near occlusion to left ICA  -IV fluids started for hydration   -Cardiology consulted  -Vascular consulted      Diabetes mellitus        Lab Results   Component Value Date     GLUCOSE 130 (H)  06/23/2024     GLUCOSE 132 (H) 06/22/2024     GLUCOSE 100 (H) 12/13/2023     GLUCOSE 114 (H) 06/12/2023   -A1C 7.15  -SSI  -Thyroid panel wnl   -Diabetic diet  -Monitor before meals and at bedtime        Hypertension      BP Readings from Last 1 Encounters:   06/23/24 126/73   - Continue lisinopril and metoprolol  - Hold amlodipine due to extremity swelling  - Monitor while admitted     Hyperlipidemia  -Continue statin     GERD  -Continue PPI        VTE Prophylaxis - [unfilled]    CODE STATUS:    Code Status and Medical Interventions:   Ordered at: 06/23/24 0724     Level Of Support Discussed With:    Patient     Code Status (Patient has no pulse and is not breathing):    CPR (Attempt to Resuscitate)     Medical Interventions (Patient has pulse or is breathing):    Full Support       This patient has been examined wearing personal protective equipment.     I discussed the patient's findings and my recommendations with patient, family, nursing staff, primary care team, and consulting provider.      Signature:Electronically signed by JA Jiménez, 06/23/24, 2:53 PM EDT.    I spent 35 minutes caring for Flaco on this date of service. This time includes time spent by me in the following activities: reviewing tests, performing a medically appropriate examination and/or evaluation, counseling and educating the patient/family/caregiver, referring and communicating with other health care professionals, documenting information in the medical record, independently interpreting results and communicating that information with the patient/family/caregiver, care coordination, ordering medications, ordering test(s), ordering procedure(s), obtaining a separately obtained history, and reviewing a separately obtained history.

## 2024-06-24 ENCOUNTER — APPOINTMENT (OUTPATIENT)
Dept: MRI IMAGING | Facility: HOSPITAL | Age: 64
End: 2024-06-24
Payer: MEDICARE

## 2024-06-24 ENCOUNTER — APPOINTMENT (OUTPATIENT)
Dept: CARDIOLOGY | Facility: HOSPITAL | Age: 64
End: 2024-06-24
Payer: MEDICARE

## 2024-06-24 ENCOUNTER — READMISSION MANAGEMENT (OUTPATIENT)
Dept: CALL CENTER | Facility: HOSPITAL | Age: 64
End: 2024-06-24
Payer: MEDICARE

## 2024-06-24 VITALS
DIASTOLIC BLOOD PRESSURE: 94 MMHG | BODY MASS INDEX: 42.23 KG/M2 | TEMPERATURE: 97.3 F | SYSTOLIC BLOOD PRESSURE: 155 MMHG | HEIGHT: 70 IN | WEIGHT: 295 LBS | OXYGEN SATURATION: 98 % | HEART RATE: 68 BPM | RESPIRATION RATE: 17 BRPM

## 2024-06-24 LAB
ANION GAP SERPL CALCULATED.3IONS-SCNC: 9 MMOL/L (ref 5–15)
BASOPHILS # BLD AUTO: 0.05 10*3/MM3 (ref 0–0.2)
BASOPHILS NFR BLD AUTO: 0.9 % (ref 0–1.5)
BH CV ECHO MEAS - ACS: 2.5 CM
BH CV ECHO MEAS - AO MAX PG: 5.3 MMHG
BH CV ECHO MEAS - AO MEAN PG: 3 MMHG
BH CV ECHO MEAS - AO ROOT DIAM: 3.9 CM
BH CV ECHO MEAS - AO V2 MAX: 115 CM/SEC
BH CV ECHO MEAS - AO V2 VTI: 24.9 CM
BH CV ECHO MEAS - AVA(I,D): 2.8 CM2
BH CV ECHO MEAS - EDV(CUBED): 117.6 ML
BH CV ECHO MEAS - ESV(CUBED): 32.8 ML
BH CV ECHO MEAS - FS: 34.7 %
BH CV ECHO MEAS - IVS/LVPW: 1 CM
BH CV ECHO MEAS - IVSD: 1.3 CM
BH CV ECHO MEAS - LA DIMENSION: 5.4 CM
BH CV ECHO MEAS - LAT PEAK E' VEL: 10.9 CM/SEC
BH CV ECHO MEAS - LV MASS(C)D: 253.7 GRAMS
BH CV ECHO MEAS - LV MAX PG: 2.22 MMHG
BH CV ECHO MEAS - LV MEAN PG: 1 MMHG
BH CV ECHO MEAS - LV V1 MAX: 74.5 CM/SEC
BH CV ECHO MEAS - LV V1 VTI: 16.5 CM
BH CV ECHO MEAS - LVIDD: 4.9 CM
BH CV ECHO MEAS - LVIDS: 3.2 CM
BH CV ECHO MEAS - LVOT AREA: 4.2 CM2
BH CV ECHO MEAS - LVOT DIAM: 2.3 CM
BH CV ECHO MEAS - LVPWD: 1.3 CM
BH CV ECHO MEAS - MED PEAK E' VEL: 6.4 CM/SEC
BH CV ECHO MEAS - MV A MAX VEL: 55.1 CM/SEC
BH CV ECHO MEAS - MV DEC SLOPE: 315 CM/SEC2
BH CV ECHO MEAS - MV DEC TIME: 0.27 SEC
BH CV ECHO MEAS - MV E MAX VEL: 58.6 CM/SEC
BH CV ECHO MEAS - MV E/A: 1.06
BH CV ECHO MEAS - MV MAX PG: 1.86 MMHG
BH CV ECHO MEAS - MV MEAN PG: 1 MMHG
BH CV ECHO MEAS - MV P1/2T: 65.6 MSEC
BH CV ECHO MEAS - MV V2 VTI: 22 CM
BH CV ECHO MEAS - MVA(P1/2T): 3.4 CM2
BH CV ECHO MEAS - MVA(VTI): 3.1 CM2
BH CV ECHO MEAS - PA V2 MAX: 102 CM/SEC
BH CV ECHO MEAS - PI END-D VEL: 107 CM/SEC
BH CV ECHO MEAS - RAP SYSTOLE: 8 MMHG
BH CV ECHO MEAS - RV MAX PG: 1.51 MMHG
BH CV ECHO MEAS - RV V1 MAX: 61.5 CM/SEC
BH CV ECHO MEAS - RV V1 VTI: 13.8 CM
BH CV ECHO MEAS - SV(LVOT): 68.6 ML
BH CV ECHO MEAS - SVI(LVOT): 27.8 ML/M2
BH CV ECHO MEAS - TAPSE (>1.6): 1.07 CM
BH CV ECHO MEASUREMENTS AVERAGE E/E' RATIO: 6.77
BH CV XLRA - TDI S': 7.6 CM/SEC
BUN SERPL-MCNC: 10 MG/DL (ref 8–23)
BUN/CREAT SERPL: 10.8 (ref 7–25)
CALCIUM SPEC-SCNC: 8.9 MG/DL (ref 8.6–10.5)
CHLORIDE SERPL-SCNC: 107 MMOL/L (ref 98–107)
CO2 SERPL-SCNC: 23 MMOL/L (ref 22–29)
CREAT SERPL-MCNC: 0.93 MG/DL (ref 0.76–1.27)
DEPRECATED RDW RBC AUTO: 41.1 FL (ref 37–54)
EGFRCR SERPLBLD CKD-EPI 2021: 92.8 ML/MIN/1.73
EOSINOPHIL # BLD AUTO: 0.25 10*3/MM3 (ref 0–0.4)
EOSINOPHIL NFR BLD AUTO: 4.5 % (ref 0.3–6.2)
ERYTHROCYTE [DISTWIDTH] IN BLOOD BY AUTOMATED COUNT: 12.5 % (ref 12.3–15.4)
GLUCOSE SERPL-MCNC: 112 MG/DL (ref 65–99)
HCT VFR BLD AUTO: 44.4 % (ref 37.5–51)
HGB BLD-MCNC: 14.7 G/DL (ref 13–17.7)
IMM GRANULOCYTES # BLD AUTO: 0.01 10*3/MM3 (ref 0–0.05)
IMM GRANULOCYTES NFR BLD AUTO: 0.2 % (ref 0–0.5)
LEFT ATRIUM VOLUME INDEX: 22.7 ML/M2
LYMPHOCYTES # BLD AUTO: 2.38 10*3/MM3 (ref 0.7–3.1)
LYMPHOCYTES NFR BLD AUTO: 43.1 % (ref 19.6–45.3)
MCH RBC QN AUTO: 30.2 PG (ref 26.6–33)
MCHC RBC AUTO-ENTMCNC: 33.1 G/DL (ref 31.5–35.7)
MCV RBC AUTO: 91.4 FL (ref 79–97)
MONOCYTES # BLD AUTO: 0.38 10*3/MM3 (ref 0.1–0.9)
MONOCYTES NFR BLD AUTO: 6.9 % (ref 5–12)
NEUTROPHILS NFR BLD AUTO: 2.45 10*3/MM3 (ref 1.7–7)
NEUTROPHILS NFR BLD AUTO: 44.4 % (ref 42.7–76)
NRBC BLD AUTO-RTO: 0 /100 WBC (ref 0–0.2)
PLATELET # BLD AUTO: 223 10*3/MM3 (ref 140–450)
PMV BLD AUTO: 10.9 FL (ref 6–12)
POTASSIUM SERPL-SCNC: 3.9 MMOL/L (ref 3.5–5.2)
QT INTERVAL: 396 MS
QTC INTERVAL: 434 MS
RBC # BLD AUTO: 4.86 10*6/MM3 (ref 4.14–5.8)
SINUS: 3.6 CM
SODIUM SERPL-SCNC: 139 MMOL/L (ref 136–145)
WBC NRBC COR # BLD AUTO: 5.52 10*3/MM3 (ref 3.4–10.8)

## 2024-06-24 PROCEDURE — 25010000002 LORAZEPAM PER 2 MG: Performed by: NURSE PRACTITIONER

## 2024-06-24 PROCEDURE — 99214 OFFICE O/P EST MOD 30 MIN: CPT | Performed by: STUDENT IN AN ORGANIZED HEALTH CARE EDUCATION/TRAINING PROGRAM

## 2024-06-24 PROCEDURE — 96374 THER/PROPH/DIAG INJ IV PUSH: CPT

## 2024-06-24 PROCEDURE — G0378 HOSPITAL OBSERVATION PER HR: HCPCS

## 2024-06-24 PROCEDURE — 25010000002 SULFUR HEXAFLUORIDE MICROSPH 60.7-25 MG RECONSTITUTED SUSPENSION: Performed by: EMERGENCY MEDICINE

## 2024-06-24 PROCEDURE — 93306 TTE W/DOPPLER COMPLETE: CPT

## 2024-06-24 PROCEDURE — 99214 OFFICE O/P EST MOD 30 MIN: CPT | Performed by: NURSE PRACTITIONER

## 2024-06-24 PROCEDURE — 80048 BASIC METABOLIC PNL TOTAL CA: CPT | Performed by: NURSE PRACTITIONER

## 2024-06-24 PROCEDURE — 85025 COMPLETE CBC W/AUTO DIFF WBC: CPT | Performed by: NURSE PRACTITIONER

## 2024-06-24 PROCEDURE — 25810000003 SODIUM CHLORIDE 0.9 % SOLUTION: Performed by: EMERGENCY MEDICINE

## 2024-06-24 PROCEDURE — 99214 OFFICE O/P EST MOD 30 MIN: CPT | Performed by: INTERNAL MEDICINE

## 2024-06-24 PROCEDURE — 93306 TTE W/DOPPLER COMPLETE: CPT | Performed by: INTERNAL MEDICINE

## 2024-06-24 PROCEDURE — 70551 MRI BRAIN STEM W/O DYE: CPT

## 2024-06-24 RX ORDER — LISINOPRIL 5 MG/1
10 TABLET ORAL EVERY EVENING
Status: DISCONTINUED | OUTPATIENT
Start: 2024-06-24 | End: 2024-06-24 | Stop reason: HOSPADM

## 2024-06-24 RX ORDER — CLOPIDOGREL BISULFATE 75 MG/1
75 TABLET ORAL DAILY
Qty: 30 TABLET | Refills: 0 | Status: SHIPPED | OUTPATIENT
Start: 2024-06-25

## 2024-06-24 RX ORDER — ROSUVASTATIN CALCIUM 5 MG/1
10 TABLET, COATED ORAL NIGHTLY
Qty: 90 TABLET | Refills: 2 | Status: SHIPPED | OUTPATIENT
Start: 2024-06-24

## 2024-06-24 RX ORDER — ROSUVASTATIN CALCIUM 10 MG/1
10 TABLET, COATED ORAL NIGHTLY
Status: DISCONTINUED | OUTPATIENT
Start: 2024-06-24 | End: 2024-06-24 | Stop reason: HOSPADM

## 2024-06-24 RX ADMIN — SULFUR HEXAFLUORIDE 5 ML: KIT at 14:30

## 2024-06-24 RX ADMIN — METOPROLOL TARTRATE 50 MG: 50 TABLET ORAL at 08:24

## 2024-06-24 RX ADMIN — CLOPIDOGREL BISULFATE 75 MG: 75 TABLET ORAL at 08:24

## 2024-06-24 RX ADMIN — PANTOPRAZOLE SODIUM 40 MG: 40 TABLET, DELAYED RELEASE ORAL at 05:00

## 2024-06-24 RX ADMIN — SODIUM CHLORIDE 100 ML/HR: 900 INJECTION, SOLUTION INTRAVENOUS at 06:39

## 2024-06-24 RX ADMIN — LISINOPRIL 10 MG: 5 TABLET ORAL at 17:31

## 2024-06-24 RX ADMIN — Medication 10 ML: at 08:25

## 2024-06-24 RX ADMIN — LORAZEPAM 0.5 MG: 2 INJECTION INTRAMUSCULAR; INTRAVENOUS at 16:29

## 2024-06-24 NOTE — CASE MANAGEMENT/SOCIAL WORK
Discharge Planning Assessment   Jeremias     Patient Name: Flaco Yan  MRN: [id number]  Today's Date: 6/24/2024    Admit Date: 6/22/2024    Plan: DC PLAN: Routine home     Discharge Needs Assessment       Row Name 06/24/24 1357       Living Environment    People in Home alone    Current Living Arrangements home    Potentially Unsafe Housing Conditions none    In the past 12 months has the electric, gas, oil, or water company threatened to shut off services in your home? No    Primary Care Provided by self    Provides Primary Care For no one    Quality of Family Relationships helpful;involved;supportive    Able to Return to Prior Arrangements yes       Resource/Environmental Concerns    Resource/Environmental Concerns none    Transportation Concerns none       Transportation Needs    In the past 12 months, has lack of transportation kept you from medical appointments or from getting medications? no    In the past 12 months, has lack of transportation kept you from meetings, work, or from getting things needed for daily living? No       Food Insecurity    Within the past 12 months, you worried that your food would run out before you got the money to buy more. Never true    Within the past 12 months, the food you bought just didn't last and you didn't have money to get more. Never true       Transition Planning    Patient/Family Anticipates Transition to home    Patient/Family Anticipated Services at Transition none    Transportation Anticipated car, drives self;family or friend will provide       Discharge Needs Assessment    Readmission Within the Last 30 Days no previous admission in last 30 days    Equipment Currently Used at Home none    Anticipated Changes Related to Illness none    Equipment Needed After Discharge none                   Discharge Plan       Row Name 06/24/24 1357       Plan    Plan DC PLAN: Routine home    Patient/Family in Agreement with Plan yes    Plan Comments Met with patient at bedside,  from routine home alone. Independent with ADL's no DME. PCP is [facility], [facility]acy is [facility] Able to afford medications, denies any issues with food or utilities. Denies any transportation issues, still drives and family will provide at time of discharge. Denies any HHC or SNF needs. Denies any concerns about return home.     DC BARRIERS: Awaiting vascular to sign off.                  Continued Care and Services - Admitted Since 6/22/2024    No active coordination exists for this encounter.       Expected Discharge Date and Time       Expected Discharge Date Expected Discharge Time    Jun 25, 2024            Demographic Summary       Row Name 06/24/24 1356       General Information    Admission Type observation    Arrived From emergency department    Required Notices Provided Observation Status Notice    Referral Source admission list    Reason for Consult discharge planning    Preferred Language English       Contact Information    Permission Granted to Share Info With     Contact Information Obtained for                    Functional Status       Row Name 06/24/24 1356       Functional Status    Usual Activity Tolerance excellent    Current Activity Tolerance excellent       Physical Activity    On average, how many days per week do you engage in moderate to strenuous exercise (like a brisk walk)? 0 days    On average, how many minutes do you engage in exercise at this level? 0 min    Number of minutes of exercise per week 0       Assessment of Health Literacy    How often do you have someone help you read hospital materials? Sometimes    How often do you have problems learning about your medical condition because of difficulty understanding written information? Sometimes    How often do you have a problem understanding what is told to you about your medical condition? Sometimes    How confident are you filling out medical forms by yourself? Somewhat    Health Literacy Moderate        Functional Status, IADL    Medications independent    Meal Preparation independent    Housekeeping independent    Laundry independent    Shopping independent       Mental Status    General Appearance WDL WDL       Mental Status Summary    Recent Changes in Mental Status/Cognitive Functioning no changes                         Patient Forms       Row Name 06/24/24 1356       Patient Forms    Important Message from Medicare ([facility]) Delivered  JOYCE 6/22 per registration    Delivered to Patient    Method of delivery In person                    Met with patient in room wearing PPE:     Maintained distance greater than six feet and spent less than 15 minutes in the room.    Joana Hassan RN   Case Management

## 2024-06-24 NOTE — PLAN OF CARE
Goal Outcome Evaluation:  Plan of Care Reviewed With: patient        Progress: improving     Problem: Adult Inpatient Plan of Care  Goal: Plan of Care Review  Outcome: Ongoing, Progressing  Flowsheets (Taken 6/24/2024 0309)  Progress: improving  Plan of Care Reviewed With: patient  Goal: Patient-Specific Goal (Individualized)  Outcome: Ongoing, Progressing  Goal: Absence of Hospital-Acquired Illness or Injury  Outcome: Ongoing, Progressing  Goal: Optimal Comfort and Wellbeing  Outcome: Ongoing, Progressing  Intervention: Provide Person-Centered Care  Recent Flowsheet Documentation  Taken 6/23/2024 2000 by Irena Coughlin, RN  Trust Relationship/Rapport:   care explained   choices provided  Goal: Readiness for Transition of Care  Outcome: Ongoing, Progressing       No overnight events.    PT request to skip 0200 vitals. PT sleeping soundly.

## 2024-06-24 NOTE — PLAN OF CARE
Goal Outcome Evaluation:               Pending Neurology consult. Echo ordered.

## 2024-06-24 NOTE — CONSULTS
Primary Care Provider: Provider, No Known     Consult requested by:  OBS Team    Reason for Consultation: Neurological evaluation - syncope    History taken from: patient chart family    Chief complaint: Dizziness       SUBJECTIVE:    History of present illness: Background per H&P: Flaco Yan is a 64 y.o. year old male who has a past medical history of hypertension, hyperlipidemia, CAD status post coronary bypass surgery and prior episode of syncope presenting yesterday with an episode of syncope when standing in his kitchen.  Reported the patient and working out in the hot sun and then had a sudden onset of dizziness and passed out at the kitchen table.  He denied chest pain or shortness of breath palpitations or an aura.  Cardiology was consulted and believes the patient was orthostatic secondary to dehydration.  Carotid Doppler showed occlusion of the right internal carotid artery and 70% left internal carotid artery disease.  Vascular surgery was consulted and order CTA of the head and neck showing the occlusion of the right ICA from its origin with reconstitution trace flow within the distal cervical ICA and petrous/cavernous ICA. Right supraclinoid ICA appears patent. Right MCA appears unremarkable. Diminutive or severely stenotic A1 segment of the right CARLOS. There is estimated 60% stenosis within the left proximal ICA approximately 1.5 cm from its origin.  Vascular surgery has no plans for surgical intervention and recommending can do antiplatelet and statin therapy.  Neurology consulted to assess posterior circulation stroke.    Of note the patient reportedly had an episode of left-sided decreased sensation lasting several hours in 2019 before resolving.    Blood pressure on admission was 140/86 sitting and 111/83 lying down.  Patient states that he was working in hotter temperatures all day the day prior and reports he does not drink enough water regularly.    CTH showing remote left cerebellar infarct  with no acute intracranial abnormality.      Patient appears to be at baseline and is neuro intact  NIHSS 0  mRS 0          - Portions of the above HPI were copied from previous encounters and edited as appropriate. PMH as detailed below.     Review of Systems   Constitutional:  Negative for fever and unexpected weight change.   HENT:  Negative for ear pain, hearing loss, tinnitus, trouble swallowing and voice change.    Eyes:  Negative for photophobia and visual disturbance.   Respiratory:  Negative for chest tightness and shortness of breath.    Cardiovascular:  Negative for chest pain and palpitations.   Gastrointestinal:  Negative for nausea and vomiting.   Genitourinary:  Negative for difficulty urinating, dysuria, frequency and urgency.   Musculoskeletal:  Negative for back pain, gait problem, neck pain and neck stiffness.   Neurological:  Positive for syncope. Negative for dizziness, tremors, seizures, facial asymmetry, speech difficulty, weakness, light-headedness, numbness and headaches.   Psychiatric/Behavioral:  Negative for agitation, behavioral problems and confusion.    All other systems reviewed and are negative.         PATIENT HISTORY:  Past Medical History:   Diagnosis Date    Coronary artery disease     GERD (gastroesophageal reflux disease)     Hyperlipidemia     Hypertension     Obesity    ,   Past Surgical History:   Procedure Laterality Date    CARDIAC CATHETERIZATION      CARDIAC SURGERY      CORONARY ARTERY BYPASS GRAFT      CORONARY STENT PLACEMENT      FINGER SURGERY      TOTAL KNEE ARTHROPLASTY Left 6/19/2020    Procedure: LEFT TOTAL KNEE;  Surgeon: Fernie Peoples II, MD;  Location: [facility] MAIN OR;  Service: Orthopedics;  Laterality: Left;   ,   Family History   Problem Relation Age of Onset    Stroke Mother     Heart disease Mother     Heart disease Father     Heart disease Sister    ,   Social History     Tobacco Use    Smoking status: Former     Current packs/day: 0.00      Average packs/day: 1 pack/day for 20.0 years (20.0 ttl pk-yrs)     Types: Cigarettes     Start date: 1979     Quit date: 1999     Years since quittin.5     Passive exposure: Past    Smokeless tobacco: Never   Vaping Use    Vaping status: Never Used   Substance Use Topics    Alcohol use: No    Drug use: No   ,   Prior to Admission medications    Medication Sig Start Date End Date Taking? Authorizing Provider   amLODIPine (NORVASC) 10 MG tablet Take 1 tablet by mouth Every Evening.   Yes ProviderWilfred MD   aspirin 81 MG EC tablet Take 1 tablet by mouth Every Night. 14  Yes Wilfred Alcocer MD   esomeprazole (nexIUM) 20 MG capsule Take 1 capsule by mouth Every Morning Before Breakfast. 9/21/15  Yes Wilfred Alcocer MD   lisinopril (PRINIVIL,ZESTRIL) 5 MG tablet Take 1 tablet by mouth Every Evening.   Yes Wilfred Alcocer MD   metoprolol tartrate (LOPRESSOR) 50 MG tablet TAKE 1 TABLET BY MOUTH TWICE A DAY 3/13/24  Yes Mike Redd MD   rosuvastatin (CRESTOR) 5 MG tablet TAKE ONE TABLET BY MOUTH ONCE NIGHTLY 23  Yes Mike Redd MD    Allergies:  Patient has no known allergies.    Current Facility-Administered Medications   Medication Dose Route Frequency Provider Last Rate Last Admin    acetaminophen (TYLENOL) tablet 650 mg  650 mg Oral Q4H PRN Carolyn Dudley APRN        Or    acetaminophen (TYLENOL) 160 MG/5ML oral solution 650 mg  650 mg Oral Q4H PRN Carolyn Dudley APRN        Or    acetaminophen (TYLENOL) suppository 650 mg  650 mg Rectal Q4H PRN Caorlyn Dudley APRN        aspirin EC tablet 81 mg  81 mg Oral Nightly Carolyn Dudley APRN   81 mg at 24    sennosides-docusate (PERICOLACE) 8.6-50 MG per tablet 2 tablet  2 tablet Oral BID PRN Carolyn Dudley APRN        And    polyethylene glycol (MIRALAX) packet 17 g  17 g Oral Daily PRN Carolyn Dudley APRN        And    bisacodyl (DULCOLAX) EC tablet 5 mg  5 mg Oral Daily PRN Carolyn Dudley APRN         And    bisacodyl (DULCOLAX) suppository 10 mg  10 mg Rectal Daily PRN Carolyn Dudley APRN        clopidogrel (PLAVIX) tablet 75 mg  75 mg Oral Daily Carolyn Dudley APRN   75 mg at 06/24/24 0824    lisinopril (PRINIVIL,ZESTRIL) tablet 10 mg  10 mg Oral Q PM Morenita Stuart APRN        LORazepam (ATIVAN) injection 0.5 mg  0.5 mg Intravenous Q6H PRN Carolyn Dudley APRN   0.5 mg at 06/24/24 1629    melatonin tablet 5 mg  5 mg Oral Nightly PRN Aaron Dietz MD   5 mg at 06/23/24 2033    methocarbamol (ROBAXIN) tablet 500 mg  500 mg Oral Q8H PRN Carolyn Dudley APRN   500 mg at 06/23/24 2034    metoprolol tartrate (LOPRESSOR) tablet 50 mg  50 mg Oral BID Carolyn Dudley APRN   50 mg at 06/24/24 0824    nitroglycerin (NITROSTAT) SL tablet 0.4 mg  0.4 mg Sublingual Q5 Min PRN Carolyn Dudley APRN        ondansetron ODT (ZOFRAN-ODT) disintegrating tablet 4 mg  4 mg Oral Q6H PRN Carolyn Dudley APRN        Or    ondansetron (ZOFRAN) injection 4 mg  4 mg Intravenous Q6H PRN Carolyn Dudley APRN        pantoprazole (PROTONIX) EC tablet 40 mg  40 mg Oral Q AM Carolyn Dudley APRN   40 mg at 06/24/24 0500    rosuvastatin (CRESTOR) tablet 10 mg  10 mg Oral Nightly SadeMorenita APRN        sodium chloride 0.9 % flush 10 mL  10 mL Intravenous PRN Aaron Dietz MD        sodium chloride 0.9 % flush 10 mL  10 mL Intravenous Q12H Carolyn Dudley APRN   10 mL at 06/24/24 0825    sodium chloride 0.9 % flush 10 mL  10 mL Intravenous PRN Carolyn Dudley APRN        sodium chloride 0.9 % infusion 40 mL  40 mL Intravenous PRN Aaron Dietz MD        sodium chloride 0.9 % infusion 40 mL  40 mL Intravenous PRN Carolyn Dudley APRN            ________________________________________________________        OBJECTIVE:  Upon today's exam    GEN: NAD, pleasant, cooperative  CVS: RRR  CHEST: No signs of resp distress, on room air    NEURO  MENTAL STATUS: AAOx3, memory intact, fund of knowledge appropriate  LANG/SPEECH: Naming and repetition  intact, fluent, follows 3-step commands  CRANIAL NERVES:  II: Pupils equal and reactive, no VF deficits  III, IV, VI: EOM intact, no gaze preference or deviation, no nystagmus.  V: normal sensation in V1, V2, and V3 segments bilaterally  VII: no asymmetry, no nasolabial fold flattening  VIII: normal hearing to speech  IX, X: normal palatal elevation, no uvular deviation  XI: 5/5 head turn and 5/5 shoulder shrug bilaterally  XII: midline tongue protrusion    MOTOR:  5/5 muscle power in Rt shoulder abductors/adductors, elbow flexors/extensors, wrist flexors/extensors, finger abductors/adductors.  5/5 in Rt hipflexors/extensors, knee flexors/extensors, ankle dorsiflexors and planter flexors.    5/5 muscle power in Lt shoulder abductors/adductors, elbow flexors/extensors, wrist flexors/extensors, finger abductors/adductors.  5/5 in Lt hipflexors/extensors, knee flexors/extensors, ankle dorsiflexors and planter flexors.    REFLEXES: 2/4 throughout, bilateral flexor plantar response, no Bailey's, no clonus    SENSORY:  Normal to touch, pinprick, vibration, temp all limbs  No hemineglect, no extinction to double sided stimulation (visual & tactile)  COORD: Normal finger to nose and heel to shin, no tremor, no dysmetria    ________________________________________________________   RESULTS REVIEW:    VITAL SIGNS:   Temp:  [97.3 °F (36.3 °C)-98.4 °F (36.9 °C)] 97.8 °F (36.6 °C)  Heart Rate:  [60-87] 71  Resp:  [16-20] 17  BP: (131-158)/(78-98) 158/98     LABS:      Lab 06/24/24  0509 06/23/24  0455 06/22/24  1740   WBC 5.52 5.87 6.81   HEMOGLOBIN 14.7 13.1 14.5   HEMATOCRIT 44.4 40.1 43.9   PLATELETS 223 226 266   NEUTROS ABS 2.45 2.43 3.59   IMMATURE GRANS (ABS) 0.01 0.02 0.02   LYMPHS ABS 2.38 2.62 2.39   MONOS ABS 0.38 0.45 0.53   EOS ABS 0.25 0.30 0.23   MCV 91.4 91.6 90.9         Lab 06/24/24  0509 06/23/24  0455 06/22/24  1740   SODIUM 139 141 139   POTASSIUM 3.9 3.8 4.4   CHLORIDE 107 108* 104   CO2 23.0 24.1 23.9    ANION GAP 9.0 8.9 11.1   BUN 10 10 12   CREATININE 0.93 0.90 0.87   EGFR 92.8 96.6 97.6   GLUCOSE 112* 130* 132*   CALCIUM 8.9 8.5* 9.6   MAGNESIUM  --  2.0  --    HEMOGLOBIN A1C  --  7.15*  --    TSH  --  0.813  --          Lab 06/23/24  0455 06/22/24  1740   TOTAL PROTEIN 5.9* 7.1   ALBUMIN 3.9 4.6   GLOBULIN  --  2.5   ALT (SGPT) 19 21   AST (SGOT) 19 23   BILIRUBIN 0.3 0.5   INDIRECT BILIRUBIN 0.2  --    BILIRUBIN DIRECT 0.1  --    ALK PHOS 55 65         Lab 06/23/24  0455 06/22/24  1740   PROBNP 52.4  --    HSTROP T 14 15         Lab 06/23/24  0455   CHOLESTEROL 144   LDL CHOL 92   HDL CHOL 30*   TRIGLYCERIDES 121             UA          12/13/2023    09:18 6/23/2024    10:20   Urinalysis   Specific Gravity, UA 1.023  1.009    Ketones, UA Negative  Negative    Blood, UA Negative  Negative    Leukocytes, UA Negative  Negative    Nitrite, UA Negative  Negative        Lab Results   Component Value Date    TSH 0.813 06/23/2024    LDL 92 06/23/2024    HGBA1C 7.15 (H) 06/23/2024    [id number] 396 06/12/2023       IMAGING STUDIES:  Duplex Carotid Ultrasound CAR    Addendum Date: 6/23/2024      Right internal carotid artery is occluded.   Left internal carotid artery demonstrates greater than 70% stenosis but less than near occlusion.  Velocities may be overestimated secondary to contralateral occlusion.     CT Angiogram Head    Result Date: 6/23/2024  1.Examination is limited due to motion artifact. 2.There is occlusion of the right ICA from its origin. Reconstituted trace flow within the distal cervical ICA and petrous/cavernous ICA. Right supraclinoid ICA appears patent. Right MCA appears unremarkable. Diminutive or severely stenotic A1 segment of  the right CARLOS. 3.There is estimated 60% stenosis within the left proximal ICA approximately 1.5 cm from its origin (see series 13, images 78 through 81). 4.Please see above for additional details. Electronically Signed: Carlos Quispe MD  6/23/2024 7:20 PM EDT   Workstation ID: [id number]    CT Angiogram Carotids    Result Date: 6/23/2024  1.Examination is limited due to motion artifact. 2.There is occlusion of the right ICA from its origin. Reconstituted trace flow within the distal cervical ICA and petrous/cavernous ICA. Right supraclinoid ICA appears patent. Right MCA appears unremarkable. Diminutive or severely stenotic A1 segment of  the right CARLOS. 3.There is estimated 60% stenosis within the left proximal ICA approximately 1.5 cm from its origin (see series 13, images 78 through 81). 4.Please see above for additional details. Electronically Signed: Carlos Quispe MD  6/23/2024 7:20 PM EDT  Workstation ID: [id number]    XR Chest 1 View    Result Date: 6/22/2024  Impression: Stable cardiomegaly without acute process. Electronically Signed: Robinson Cabello MD  6/22/2024 6:42 PM EDT  Workstation ID: [id number]    XR Knee 4+ View Left    Result Date: 6/22/2024  Impression: 1. Negative for fracture. 2. Intact left knee prosthesis. Electronically Signed: Robinson Cabello MD  6/22/2024 6:41 PM EDT  Workstation ID: [id number]    CT Head Without Contrast    Result Date: 6/22/2024  Impression: 1. No acute intracranial abnormality. 2. Remote left cerebellar infarct. Electronically Signed: Robinson Cabello MD  6/22/2024 5:56 PM EDT  Workstation ID: [id number]     I reviewed the patient's new clinical results.    ________________________________________________________     PROBLEM LIST:    Syncope    Right carotid artery occlusion    Left carotid stenosis            ASSESSMENT/PLAN:      64-year-old male presenting with syncopal episode after working in hot temperatures for extended period time concerning for possible orthostatic hypotension found to have an occluded right ICA from its origin with reconstituted trace flow within the distal cervical ICA and a 60% stenosis of the left proximal ICA.      NIHSS 0; mRS 0; neuro intact; MAEW  CTH:   No acute intracranial abnormality.   Remote left  cerebellar infarct.  CTA H/N:  There is occlusion of the right ICA from its origin. Reconstituted trace flow within the distal cervical ICA and petrous/cavernous ICA. Right supraclinoid ICA appears patent. Right MCA appears unremarkable.   Diminutive or severely stenotic A1 segment of the right CARLOS.   Recommend outpatient follow-up with neuroendovascular and stroke neurology @ [facility]  Dr. Phillip Lara (Vascular Neuro)  Dr. Randy Tejeda (DOM)  There is estimated 60% stenosis within the left proximal ICA approximately 1.5 cm from its origin   Recommend obtaining MRI brain WO to assess the extent of ischemic insults, if any.   Continue with DAPT   Cardiology following   orthostatic hypotension and dehydration  TTE w/bubble study - pending   LDL 92 (6/23/2024)  Patient is currently on rosuvastatin 10 mg recommend increasing this to Lipitor 80 mg nightly  Hemoglobin A1c   7.15% (6/24/2024)   Recommend management per primary  Discussed modification of stroke risk factors  Recommend following up with neurology outpatient within the next 2 to 4 weeks if possible (see above)  Medication side effects were discussed.    Encouraged p.o. fluid intake and avoiding working in extreme temperatures.  All questions were answered to patient and family satisfaction.  Plan discussed with primary service and nursing staff.          Modification of stroke risk factors:   - Blood pressure should be less than 130/80 outpatient, HbA1c less than 6.5, LDL less than 70; b12>500 and smoking cessation if applicable. We would be grateful if the primary team / primary care physician would keep a close watch on the above targets.  - Stroke education  - Follow up with neurologist of choice      I discussed the patient's findings and my recommendations with patient, nursing staff, and primary care team    Amalia Wade MD  06/24/24  16:30 EDT

## 2024-06-24 NOTE — DISCHARGE SUMMARY
[facility]    Mike Redd MD    CHIEF COMPLAINT:     Syncope     HISTORY OF PRESENT ILLNESS:    [facility]    ED 6/22/24: 64-year-old male had the onset of syncopal episode today. The patient states that he has had extensive heat exposure all week long not had a lot of oral intake. He states that that he frequently drinks Diet Coke. He reports that he has had some recent decrease in exercise tolerance as well as some chest tightness. He reports no shortness of breath. He reports no nausea or diaphoresis prior to the syncopal episode he also denies palpitations prior to syncopal episodes. The patient reports that he has had no vomiting or diarrhea. He states that he occasionally has palpitations.     Observation 6/23/24: Is a 64-year-old male presenting to the hospital after syncopal episode at home.  Patient states he has been outside with extensive heat exposure and decreased oral intake.  Patient has reported some decrease in exercise tolerance but denies chest pain or shortness of breath at this time.  Patient denies fever, nausea vomiting headache change in vision or dizziness.  Patient reports losing consciousness for unknown amount of time during syncopal episode.  Patient denies hitting his head but does report hitting left knee.  Patient denies history of seizures or strokes.  Denies drug or alcohol use.       Past Medical History:   Diagnosis Date    Coronary artery disease     GERD (gastroesophageal reflux disease)     Hyperlipidemia     Hypertension     Obesity      Past Surgical History:   Procedure Laterality Date    CARDIAC CATHETERIZATION      CARDIAC SURGERY      CORONARY ARTERY BYPASS GRAFT      CORONARY STENT PLACEMENT      FINGER SURGERY      TOTAL KNEE ARTHROPLASTY Left 6/19/2020    Procedure: LEFT TOTAL KNEE;  Surgeon: Fernie Peoples II, MD;  Location: [facility];  Service: Orthopedics;  Laterality: Left;     Family History   Problem Relation Age of Onset     Stroke Mother     Heart disease Mother     Heart disease Father     Heart disease Sister      Social History     Tobacco Use    Smoking status: Former     Current packs/day: 0.00     Average packs/day: 1 pack/day for 20.0 years (20.0 ttl pk-yrs)     Types: Cigarettes     Start date: 1979     Quit date: 1999     Years since quittin.5     Passive exposure: Past    Smokeless tobacco: Never   Vaping Use    Vaping status: Never Used   Substance Use Topics    Alcohol use: No    Drug use: No     Medications Prior to Admission   Medication Sig Dispense Refill Last Dose    amLODIPine (NORVASC) 10 MG tablet Take 1 tablet by mouth Every Evening.   2024    aspirin 81 MG EC tablet Take 1 tablet by mouth Every Night.   2024    esomeprazole (nexIUM) 20 MG capsule Take 1 capsule by mouth Every Morning Before Breakfast.   2024    lisinopril (PRINIVIL,ZESTRIL) 5 MG tablet Take 1 tablet by mouth Every Evening.   2024    metoprolol tartrate (LOPRESSOR) 50 MG tablet TAKE 1 TABLET BY MOUTH TWICE A  tablet 3 2024    rosuvastatin (CRESTOR) 5 MG tablet TAKE ONE TABLET BY MOUTH ONCE NIGHTLY 90 tablet 2 2024     Allergies:  Patient has no known allergies.    Immunization History   Administered Date(s) Administered    COVID-19 (PFIZER) Purple Cap Monovalent 2021, 2021    Tdap 2016           REVIEW OF SYSTEMS:    Review of Systems   Constitutional: Negative.   HENT: Negative.     Eyes: Negative.    Cardiovascular:  Positive for syncope.   Respiratory: Negative.     Endocrine: Negative.    Hematologic/Lymphatic: Negative.    Skin: Negative.    Musculoskeletal: Negative.    Gastrointestinal: Negative.    Genitourinary: Negative.    Psychiatric/Behavioral:  The patient is nervous/anxious.    Allergic/Immunologic: Negative.        Vital Signs  Temp:  [97.3 °F (36.3 °C)-98.4 °F (36.9 °C)] 97.8 °F (36.6 °C)  Heart Rate:  [60-87] 70  Resp:  [14-20] 17  BP: (126-156)/(73-93)  156/92          Physical Exam:  Physical Exam  Vitals and nursing note reviewed.   Constitutional:       Appearance: Normal appearance.   HENT:      Head: Normocephalic and atraumatic.      Right Ear: External ear normal.      Left Ear: External ear normal.      Nose: Nose normal.      Mouth/Throat:      Pharynx: Oropharynx is clear.   Eyes:      Extraocular Movements: Extraocular movements intact.      Conjunctiva/sclera: Conjunctivae normal.      Pupils: Pupils are equal, round, and reactive to light.   Cardiovascular:      Rate and Rhythm: Normal rate and regular rhythm.      Pulses: Normal pulses.      Heart sounds: Normal heart sounds.   Pulmonary:      Effort: Pulmonary effort is normal.   Abdominal:      General: Bowel sounds are normal.   Musculoskeletal:         General: Normal range of motion.      Cervical back: Normal range of motion.   Skin:     General: Skin is warm.      Capillary Refill: Capillary refill takes less than 2 seconds.   Neurological:      Mental Status: He is alert and oriented to person, place, and time.   Psychiatric:         Mood and Affect: Mood is anxious.         Behavior: Behavior normal.         Thought Content: Thought content normal.         Judgment: Judgment normal.         Emotional Behavior:    WNl   Debilities:   none  Results Review:    I reviewed the patient's new clinical results.  Lab Results (most recent)       Procedure Component Value Units Date/Time    Basic Metabolic Panel [[id number]]  (Abnormal) Collected: 06/24/24 0509    Specimen: Blood from Arm, Right Updated: 06/24/24 0610     Glucose 112 mg/dL      BUN 10 mg/dL      Creatinine 0.93 mg/dL      Sodium 139 mmol/L      Potassium 3.9 mmol/L      Chloride 107 mmol/L      CO2 23.0 mmol/L      Calcium 8.9 mg/dL      BUN/Creatinine Ratio 10.8     Anion Gap 9.0 mmol/L      eGFR 92.8 mL/min/1.73     Narrative:      GFR Normal >60  Chronic Kidney Disease <60  Kidney Failure <15      CBC & Differential [[id number]]   (Normal) Collected: 06/24/24 0509    Specimen: Blood from Arm, Right Updated: 06/24/24 0547    Narrative:      The following orders were created for panel order CBC & Differential.  Procedure                               Abnormality         Status                     ---------                               -----------         ------                     CBC Auto Differential[[id number]]        Normal              Final result                 Please view results for these tests on the individual orders.    CBC Auto Differential [[id number]]  (Normal) Collected: 06/24/24 0509    Specimen: Blood from Arm, Right Updated: 06/24/24 0547     WBC 5.52 10*3/mm3      RBC 4.86 10*6/mm3      Hemoglobin 14.7 g/dL      Hematocrit 44.4 %      MCV 91.4 fL      MCH 30.2 pg      MCHC 33.1 g/dL      RDW 12.5 %      RDW-SD 41.1 fl      MPV 10.9 fL      Platelets 223 10*3/mm3      Neutrophil % 44.4 %      Lymphocyte % 43.1 %      Monocyte % 6.9 %      Eosinophil % 4.5 %      Basophil % 0.9 %      Immature Grans % 0.2 %      Neutrophils, Absolute 2.45 10*3/mm3      Lymphocytes, Absolute 2.38 10*3/mm3      Monocytes, Absolute 0.38 10*3/mm3      Eosinophils, Absolute 0.25 10*3/mm3      Basophils, Absolute 0.05 10*3/mm3      Immature Grans, Absolute 0.01 10*3/mm3      nRBC 0.0 /100 WBC     Urinalysis With Culture If Indicated - Urine, Clean Catch [[id number]]  (Normal) Collected: 06/23/24 1020    Specimen: Urine, Clean Catch Updated: 06/23/24 1049     Color, UA Yellow     Appearance, UA Clear     pH, UA 7.0     Specific Gravity, UA 1.009     Glucose, UA Negative     Ketones, UA Negative     Bilirubin, UA Negative     Blood, UA Negative     Protein, UA Negative     Leuk Esterase, UA Negative     Nitrite, UA Negative     Urobilinogen, UA 0.2 E.U./dL    Narrative:      In absence of clinical symptoms, the presence of pyuria, bacteria, and/or nitrites on the urinalysis result does not correlate with infection.  Urine microscopic not indicated.     BNP [[id number]]  (Normal) Collected: 06/23/24 0455    Specimen: Blood Updated: 06/23/24 0939     proBNP 52.4 pg/mL     Narrative:      This assay is used as an aid in the diagnosis of individuals suspected of having heart failure. It can be used as an aid in the diagnosis of acute decompensated heart failure (ADHF) in patients presenting with signs and symptoms of ADHF to the emergency department (ED). In addition, NT-proBNP of <300 pg/mL indicates ADHF is not likely.    Age Range Result Interpretation  NT-proBNP Concentration (pg/mL:      <50             Positive            >450                   Gray                 300-450                    Negative             <300    50-75           Positive            >900                  Gray                300-900                  Negative            <300      >75             Positive            >1800                  Gray                300-1800                  Negative            <300    Lipid Panel [[id number]]  (Abnormal) Collected: 06/23/24 0455    Specimen: Blood Updated: 06/23/24 0820     Total Cholesterol 144 mg/dL      Triglycerides 121 mg/dL      HDL Cholesterol 30 mg/dL      LDL Cholesterol  92 mg/dL      VLDL Cholesterol 22 mg/dL      LDL/HDL Ratio 2.99    Narrative:      Cholesterol Reference Ranges  (U.S. Department of Health and Human Services ATP III Classifications)    Desirable          <200 mg/dL  Borderline High    200-239 mg/dL  High Risk          >240 mg/dL      Triglyceride Reference Ranges  (U.S. Department of Health and Human Services ATP III Classifications)    Normal           <150 mg/dL  Borderline High  150-199 mg/dL  High             200-499 mg/dL  Very High        >500 mg/dL    HDL Reference Ranges  (U.S. Department of Health and Human Services ATP III Classifications)    Low     <40 mg/dl (major risk factor for CHD)  High    >60 mg/dl ('negative' risk factor for CHD)        LDL Reference Ranges  (U.S. Department of Health and Human Services  ATP III Classifications)    Optimal          <100 mg/dL  Near Optimal     100-129 mg/dL  Borderline High  130-159 mg/dL  High             160-189 mg/dL  Very High        >189 mg/dL    Hepatic Function Panel [[id number]]  (Abnormal) Collected: 06/23/24 0455    Specimen: Blood Updated: 06/23/24 0820     Total Protein 5.9 g/dL      Albumin 3.9 g/dL      ALT (SGPT) 19 U/L      AST (SGOT) 19 U/L      Alkaline Phosphatase 55 U/L      Total Bilirubin 0.3 mg/dL      Bilirubin, Direct 0.1 mg/dL      Bilirubin, Indirect 0.2 mg/dL      Comment: Unable to calculate       TSH [[id number]]  (Normal) Collected: 06/23/24 0455    Specimen: Blood Updated: 06/23/24 0820     TSH 0.813 uIU/mL     T4, Free [[id number]]  (Normal) Collected: 06/23/24 0455    Specimen: Blood Updated: 06/23/24 0820     Free T4 1.04 ng/dL     Magnesium [[id number]]  (Normal) Collected: 06/23/24 0455    Specimen: Blood Updated: 06/23/24 0820     Magnesium 2.0 mg/dL     CK [[id number]]  (Normal) Collected: 06/23/24 0455    Specimen: Blood Updated: 06/23/24 0820     Creatine Kinase 110 U/L     Hemoglobin A1c [[id number]]  (Abnormal) Collected: 06/23/24 0455    Specimen: Blood Updated: 06/23/24 0756     Hemoglobin A1C 7.15 %     Basic Metabolic Panel [[id number]]  (Abnormal) Collected: 06/23/24 0455    Specimen: Blood Updated: 06/23/24 0638     Glucose 130 mg/dL      BUN 10 mg/dL      Creatinine 0.90 mg/dL      Sodium 141 mmol/L      Potassium 3.8 mmol/L      Chloride 108 mmol/L      CO2 24.1 mmol/L      Calcium 8.5 mg/dL      BUN/Creatinine Ratio 11.1     Anion Gap 8.9 mmol/L      eGFR 96.6 mL/min/1.73     Narrative:      GFR Normal >60  Chronic Kidney Disease <60  Kidney Failure <15      High Sensitivity Troponin T [[id number]]  (Normal) Collected: 06/23/24 0455    Specimen: Blood Updated: 06/23/24 0638     HS Troponin T 14 ng/L     Narrative:      High Sensitive Troponin T Reference Range:  <14.0 ng/L- Negative Female for AMI  <22.0 ng/L- Negative Male for AMI  >=14 -  Abnormal Female indicating possible myocardial injury.  >=22 - Abnormal Male indicating possible myocardial injury.   Clinicians would have to utilize clinical acumen, EKG, Troponin, and serial changes to determine if it is an Acute Myocardial Infarction or myocardial injury due to an underlying chronic condition.         CBC Auto Differential [[id number]]  (Abnormal) Collected: 06/23/24 0455    Specimen: Blood Updated: 06/23/24 0614     WBC 5.87 10*3/mm3      RBC 4.38 10*6/mm3      Hemoglobin 13.1 g/dL      Hematocrit 40.1 %      MCV 91.6 fL      MCH 29.9 pg      MCHC 32.7 g/dL      RDW 12.6 %      RDW-SD 42.4 fl      MPV 11.3 fL      Platelets 226 10*3/mm3      Neutrophil % 41.4 %      Lymphocyte % 44.6 %      Monocyte % 7.7 %      Eosinophil % 5.1 %      Basophil % 0.9 %      Immature Grans % 0.3 %      Neutrophils, Absolute 2.43 10*3/mm3      Lymphocytes, Absolute 2.62 10*3/mm3      Monocytes, Absolute 0.45 10*3/mm3      Eosinophils, Absolute 0.30 10*3/mm3      Basophils, Absolute 0.05 10*3/mm3      Immature Grans, Absolute 0.02 10*3/mm3      nRBC 0.0 /100 WBC     Comprehensive Metabolic Panel [[id number]]  (Abnormal) Collected: 06/22/24 1740    Specimen: Blood from Arm, Right Updated: 06/22/24 1806     Glucose 132 mg/dL      BUN 12 mg/dL      Creatinine 0.87 mg/dL      Sodium 139 mmol/L      Potassium 4.4 mmol/L      Chloride 104 mmol/L      CO2 23.9 mmol/L      Calcium 9.6 mg/dL      Total Protein 7.1 g/dL      Albumin 4.6 g/dL      ALT (SGPT) 21 U/L      AST (SGOT) 23 U/L      Alkaline Phosphatase 65 U/L      Total Bilirubin 0.5 mg/dL      Globulin 2.5 gm/dL      A/G Ratio 1.8 g/dL      BUN/Creatinine Ratio 13.8     Anion Gap 11.1 mmol/L      eGFR 97.6 mL/min/1.73     Narrative:      GFR Normal >60  Chronic Kidney Disease <60  Kidney Failure <15      Single High Sensitivity Troponin T [[id number]]  (Normal) Collected: 06/22/24 1740    Specimen: Blood from Arm, Right Updated: 06/22/24 1806     HS Troponin T 15  ng/L     Narrative:      High Sensitive Troponin T Reference Range:  <14.0 ng/L- Negative Female for AMI  <22.0 ng/L- Negative Male for AMI  >=14 - Abnormal Female indicating possible myocardial injury.  >=22 - Abnormal Male indicating possible myocardial injury.   Clinicians would have to utilize clinical acumen, EKG, Troponin, and serial changes to determine if it is an Acute Myocardial Infarction or myocardial injury due to an underlying chronic condition.         CK [[id number]]  (Normal) Collected: 06/22/24 1740    Specimen: Blood from Arm, Right Updated: 06/22/24 1806     Creatine Kinase 153 U/L     CBC & Differential [[id number]]  (Normal) Collected: 06/22/24 1740    Specimen: Blood from Arm, Right Updated: 06/22/24 1745    Narrative:      The following orders were created for panel order CBC & Differential.  Procedure                               Abnormality         Status                     ---------                               -----------         ------                     CBC Auto Differential[[id number]]        Normal              Final result                 Please view results for these tests on the individual orders.            Imaging Results (Most Recent)       Procedure Component Value Units Date/Time    CT Angiogram Head [[id number]] Collected: 06/23/24 1908     Updated: 06/23/24 1922    Narrative:      CT ANGIOGRAM HEAD, CT ANGIOGRAM CAROTIDS    Date of Exam: 6/23/2024 6:26 PM EDT    Indication: Right ICA occluded on ultrasound and left ICA greater than 70%.    Comparison: Noncontrast head CT dated 6/22/2024    Technique: CTA of the head was performed after the uneventful intravenous administration of iodinated contrast. Reconstructed coronal and sagittal images were also obtained. In addition, a 3-D volume rendered image was created for interpretation.   Automated exposure control and iterative reconstruction methods were used.    FINDINGS:    Vascular Findings:  Examination is somewhat limited  due to motion artifact    There is occlusion of the right ICA from its origin. Reconstituted trace flow within the distal cervical ICA and petrous/cavernous ICA. Right supraclinoid ICA appears patent. Right MCA appears unremarkable. Diminutive or severely stenotic A1 segment of   the right CARLOS. Remainder of the right CARLOS appears unremarkable. Right common carotid artery appears unremarkable. Basilar artery and right posterior cerebral artery appear unremarkable.    There is moderate plaque within the left carotid bifurcation/proximal ICA. There is estimated 60% stenosis within the left proximal ICA approximately 1.5 cm from its origin (see series 13, images 78 through 81). Left common carotid, remainder of the left   ICA, middle cerebral, anterior cerebral, vertebral, and posterior cerebral arteries are patent without abrupt cut off or aneurysmal dilation.      Non-vascular Findings:    For description of nonvascular intracranial findings, please refer to the noncontrast head CT performed 6/22/2024.    No acute abnormality is identified within the visualized soft tissue or bony structures of the neck.    The visualized lung apices are clear.      Impression:      1.Examination is limited due to motion artifact.  2.There is occlusion of the right ICA from its origin. Reconstituted trace flow within the distal cervical ICA and petrous/cavernous ICA. Right supraclinoid ICA appears patent. Right MCA appears unremarkable. Diminutive or severely stenotic A1 segment of   the right CARLOS.  3.There is estimated 60% stenosis within the left proximal ICA approximately 1.5 cm from its origin (see series 13, images 78 through 81).  4.Please see above for additional details.        Electronically Signed: Carlos Quispe MD    6/23/2024 7:20 PM EDT    Workstation ID: [id number]    CT Angiogram Carotids [[id number]] Collected: 06/23/24 1908     Updated: 06/23/24 1922    Narrative:      CT ANGIOGRAM HEAD, CT ANGIOGRAM CAROTIDS    Date of  Exam: 6/23/2024 6:26 PM EDT    Indication: Right ICA occluded on ultrasound and left ICA greater than 70%.    Comparison: Noncontrast head CT dated 6/22/2024    Technique: CTA of the head was performed after the uneventful intravenous administration of iodinated contrast. Reconstructed coronal and sagittal images were also obtained. In addition, a 3-D volume rendered image was created for interpretation.   Automated exposure control and iterative reconstruction methods were used.    FINDINGS:    Vascular Findings:  Examination is somewhat limited due to motion artifact    There is occlusion of the right ICA from its origin. Reconstituted trace flow within the distal cervical ICA and petrous/cavernous ICA. Right supraclinoid ICA appears patent. Right MCA appears unremarkable. Diminutive or severely stenotic A1 segment of   the right CARLOS. Remainder of the right CARLOS appears unremarkable. Right common carotid artery appears unremarkable. Basilar artery and right posterior cerebral artery appear unremarkable.    There is moderate plaque within the left carotid bifurcation/proximal ICA. There is estimated 60% stenosis within the left proximal ICA approximately 1.5 cm from its origin (see series 13, images 78 through 81). Left common carotid, remainder of the left   ICA, middle cerebral, anterior cerebral, vertebral, and posterior cerebral arteries are patent without abrupt cut off or aneurysmal dilation.      Non-vascular Findings:    For description of nonvascular intracranial findings, please refer to the noncontrast head CT performed 6/22/2024.    No acute abnormality is identified within the visualized soft tissue or bony structures of the neck.    The visualized lung apices are clear.      Impression:      1.Examination is limited due to motion artifact.  2.There is occlusion of the right ICA from its origin. Reconstituted trace flow within the distal cervical ICA and petrous/cavernous ICA. Right supraclinoid ICA  appears patent. Right MCA appears unremarkable. Diminutive or severely stenotic A1 segment of   the right CARLSO.  3.There is estimated 60% stenosis within the left proximal ICA approximately 1.5 cm from its origin (see series 13, images 78 through 81).  4.Please see above for additional details.        Electronically Signed: Carlos Quispe MD    6/23/2024 7:20 PM EDT    Workstation ID: [id number]    XR Chest 1 View [[id number]] Collected: 06/22/24 1841     Updated: 06/22/24 1844    Narrative:      XR CHEST 1 VW    Date of Exam: 6/22/2024 6:34 PM EDT    Indication: syncope    Comparison: 6/17/2020    Findings:  Prior sternotomy. Stable cardiomegaly. Clear lungs. No pneumothorax. No pleural effusion. Osseous structures appear intact.      Impression:      Impression:  Stable cardiomegaly without acute process.      Electronically Signed: Robinson Cabello MD    6/22/2024 6:42 PM EDT    Workstation ID: [id number]    XR Knee 4+ View Left [[id number]] Collected: 06/22/24 1835     Updated: 06/22/24 1843    Narrative:      XR KNEE 4+ VW LEFT    Date of Exam: 6/22/2024 6:34 PM EDT    Indication: fall now with swelling    Comparison: None available.    Findings:  Knee prosthesis in place. No periprosthetic fracture. Patellar resurfacing. No joint effusion. Several soft tissue calcifications overlie the popliteal fossa soft tissues largest measuring up to 1.4 cm.      Impression:      Impression:  1. Negative for fracture.  2. Intact left knee prosthesis.      Electronically Signed: Robinson Cabello MD    6/22/2024 6:41 PM EDT    Workstation ID: [id number]    CT Head Without Contrast [[id number]] Collected: 06/22/24 1753     Updated: 06/22/24 1759    Narrative:      CT HEAD WO CONTRAST    Date of Exam: 6/22/2024 5:52 PM EDT    Indication: syncope.    Comparison: CT head without contrast 6/7/2020    Technique: Axial CT images were obtained of the head without contrast administration.  Coronal reconstructions were performed.  Automated exposure  control and iterative reconstruction methods were used.      Findings:  Negative for intracranial hemorrhage. No mass effect or midline shift. The ventricles and cortical sulci are normally configured. Gray-white matter differentiation maintained. Posterior fossa without acute abnormality. There is an area of hypodensity   measuring 13 x 10 mm involving the left medial cerebellum most compatible with a remote infarct (image 14). Globes are intact and symmetric. No retro-orbital abnormality. The mastoid air cells are well-aerated. There is no sinus fluid level. Calvarium   intact.      Impression:      Impression:  1. No acute intracranial abnormality.  2. Remote left cerebellar infarct.      Electronically Signed: Robinson Cabello MD    6/22/2024 5:56 PM EDT    Workstation ID: [id number]          reviewed    ECG/EMG Results (most recent)       Procedure Component Value Units Date/Time    ECG 12 Lead Syncope [[id number]] Collected: 06/22/24 1627     Updated: 06/24/24 0759     QT Interval 396 ms      QTC Interval 434 ms     Narrative:      HEART RATE= 72  bpm  RR Interval= 832  ms  DE Interval= 222  ms  P Horizontal Axis= 24  deg  P Front Axis= -4  deg  QRSD Interval= 108  ms  QT Interval= 396  ms  QTcB= 434  ms  QRS Axis= 2  deg  T Wave Axis= 39  deg  - ABNORMAL ECG -  Sinus rhythm  Prolonged DE interval  When compared with ECG of 07-Jun-2020 12:29:57,  Significant axis, voltage or hypertrophy change  Electronically Signed By: Alonzo Whiteside ([facility]) 24-Jun-2024 07:59:12  Date and Time of Study: 2024-06-22 16:27:29          reviewed    Results for orders placed during the hospital encounter of 06/22/24    Duplex Carotid Ultrasound CAR    Interpretation Summary    Right internal carotid artery is occluded.    Left internal carotid artery demonstrates greater than 70% stenosis but less than near occlusion.  Velocities may be overestimated secondary to contralateral occlusion.          Microbiology Results (last 10 days)       **  No results found for the last 240 hours. **            Assessment & Plan     Syncope    Right carotid artery occlusion    Left carotid stenosis        Syncope            Lab Results   Component Value Date     TROPONINT 14 06/23/2024     TROPONINT 15 06/22/2024     TROPONINT <0.010 06/07/2020   -BNP 52.4, CK1 10  -Chest X-ray: Stable cardiomegaly without acute process  -X-ray of left knee shows negative fracture with intact left knee prosthesis  -EKG: Sinus rhythm with prolonged W[place] interval of 2 and 22 ms  -Urinalysis unremarkable   -CT head: No acute intercranial process with remote left cerebral infarct  -Telemetry  -Start ASA and Plavix   -Carotid duplex: occlusion to right ICA, greater than 70% but less than near occlusion to left ICA  -IV fluids started for hydration   -Cardiology consulted  -Vascular consulted      Diabetes mellitus            Lab Results   Component Value Date     GLUCOSE 130 (H) 06/23/2024     GLUCOSE 132 (H) 06/22/2024     GLUCOSE 100 (H) 12/13/2023     GLUCOSE 114 (H) 06/12/2023   -A1C 7.15  -SSI  -Thyroid panel wnl   -Diabetic diet  -Monitor before meals and at bedtime        Hypertension        BP Readings from Last 1 Encounters:   06/24/24 156/92   - Continue lisinopril and metoprolol  - Hold amlodipine due to extremity swelling  - Monitor while admitted     Hyperlipidemia  -Continue statin     GERD  -Continue PPI       I discussed the patients findings and my recommendations with patient and family.     Discharge Diagnosis:      Syncope    Right carotid artery occlusion    Left carotid stenosis      Hospital Course  Patient is a 64 y.o. male presented with syncope.  Troponins 14 and 15.  BNP and CK unremarkable.  Chest x-ray shows stable cardiomegaly without acute process.  Patient states he did fall on his left knee with x-ray of left knee showed negative fracture and intact left knee prosthesis.  EKG showed sinus rhythm prolonged W[place] interval 222 ms.  CT of head showed no acute  intercranial process with remote left infarct.  Patient states he has no known history of CVAs.  Patient started aspirin Plavix.  Carotid duplex showed occlusion of the right ICA greater than 70% but less than near occlusion of the left ICA.  Patient was consulted and seen by cardiology vascular and neurology.  Patient started IV fluids for hydration.  Patient had a CTA which showed proximal 60% stenosis of the left ICA.  No surgical intervention at this time follow-up with neurology and continue antiplatelets and statin therapy.  Patient also eval by neurology which suggested MRI to assess extent of ischemia.  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  Patient very anxious and ready to be discharged.  Patient continue taking medication as prescribed.  Patient advised about strict follow-up with vascular neurosurgery at [facility] and to monitor blood pressures closely at home.  Patient to increase fluid intake and electrolyte drinks.  Testing recommendations reviewed in length with patient family and they agree with treatment plan.  If symptoms worsen patient to call 911 or go to nearest ED.    Past Medical History:     Past Medical History:   Diagnosis Date    Coronary artery disease     GERD (gastroesophageal reflux disease)     Hyperlipidemia     Hypertension     Obesity        Past Surgical History:     Past Surgical History:   Procedure Laterality Date    CARDIAC CATHETERIZATION      CARDIAC SURGERY      CORONARY ARTERY BYPASS GRAFT      CORONARY STENT PLACEMENT      FINGER SURGERY      TOTAL KNEE ARTHROPLASTY Left 6/19/2020    Procedure: LEFT TOTAL KNEE;  Surgeon: Fernie Peoples II, MD;  Location: [facility];  Service: Orthopedics;  Laterality: Left;       Social History:   Social History     Socioeconomic History    Marital status: Single   Tobacco Use    Smoking  status: Former     Current packs/day: 0.00     Average packs/day: 1 pack/day for 20.0 years (20.0 ttl pk-yrs)     Types: Cigarettes     Start date: 1979     Quit date: 1999     Years since quittin.5     Passive exposure: Past    Smokeless tobacco: Never   Vaping Use    Vaping status: Never Used   Substance and Sexual Activity    Alcohol use: No    Drug use: No    Sexual activity: Defer       Procedures Performed         Consults:   Consults       Date and Time Order Name Status Description    2024  4:10 PM Inpatient Neurology Consult Stroke Completed     2024  2:51 PM Inpatient Vascular Surgery Consult Completed     2024  8:48 PM Inpatient Cardiology Consult Completed             Condition on Discharge:     Stable    Discharge Disposition      Discharge Medications     Discharge Medications        ASK your doctor about these medications        Instructions Start Date   amLODIPine 10 MG tablet  Commonly known as: NORVASC  Ask about: Which instructions should I use?   10 mg, Oral, Every Evening      aspirin 81 MG EC tablet   81 mg, Oral, Nightly      esomeprazole 20 MG capsule  Commonly known as: nexIUM   20 mg, Oral, Every Morning Before Breakfast      lisinopril 5 MG tablet  Commonly known as: PRINIVIL,ZESTRIL  Ask about: Which instructions should I use?   5 mg, Oral, Every Evening      metoprolol tartrate 50 MG tablet  Commonly known as: LOPRESSOR   TAKE 1 TABLET BY MOUTH TWICE A DAY      rosuvastatin 5 MG tablet  Commonly known as: CRESTOR   TAKE ONE TABLET BY MOUTH ONCE NIGHTLY               Discharge Diet:     Activity at Discharge:     Follow-up Appointments  Future Appointments   Date Time Provider Department Center   2024  8:30 AM LAB [facility]   2024  9:30 AM Mike Redd MD [facility]   2024  4:20 PM Hubert Fuller MD [facility]   2/3/2025  8:30 AM LAB [facility]   2/10/2025  8:00 AM  Mike Redd MD [facility]         Test Results Pending at Discharge       Risk for Readmission (LACE) Score: 4 (6/24/2024  6:00 AM)          JA Jiménez  06/24/24  12:23 EDT        I spent 35 minutes caring for Flaco on this date of service. This time includes time spent by me in the following activities: reviewing tests, performing a medically appropriate examination and/or evaluation, counseling and educating the patient/family/caregiver, referring and communicating with other health care professionals, documenting information in the medical record, independently interpreting results and communicating that information with the patient/family/caregiver, care coordination, ordering medications, ordering test(s), ordering procedure(s), obtaining a separately obtained history, and reviewing a separately obtained history.

## 2024-06-24 NOTE — DISCHARGE INSTRUCTIONS
Please follow-up with Dr. Redd in the next 1-2 weeks for glucose monitoring and management.   Monitor blood pressures at home at least daily until follow-up with cardiology.

## 2024-06-24 NOTE — PROGRESS NOTES
[facility] Vascular Surgery Progress Note    Name: Flaco Yan ADMIT: 2024   : 1960  PCP: Mike Redd MD    MRN: [id number] LOS: 0 days   AGE/SEX: 64 y.o. male  ROOM: [address] [place] [address]    CC: Right ICA occlusion, left carotid stenosis    Subjective     Patient resting in bed. Denies any further syncopal episodes. Denies any signs or symptoms of TIA/CVA.    Objective     Scheduled Medications:   [Held by provider] amLODIPine, 10 mg, Oral, Q PM  aspirin, 81 mg, Oral, Nightly  clopidogrel, 75 mg, Oral, Daily  lisinopril, 5 mg, Oral, Q PM  metoprolol tartrate, 50 mg, Oral, BID  pantoprazole, 40 mg, Oral, Q AM  rosuvastatin, 5 mg, Oral, Nightly  sodium chloride, 10 mL, Intravenous, Q12H        Active Infusions:       As Needed Medications:    acetaminophen **OR** acetaminophen **OR** acetaminophen    senna-docusate sodium **AND** polyethylene glycol **AND** bisacodyl **AND** bisacodyl    LORazepam    melatonin    methocarbamol    nitroglycerin    ondansetron ODT **OR** ondansetron    sodium chloride    sodium chloride    sodium chloride    sodium chloride    Vital Signs  Vitals:    24 0741   BP: 156/92   Pulse: 70   Resp: 17   Temp: 97.8 °F (36.6 °C)   SpO2: 96%      Body mass index is 42.39 kg/m².     Physical Exam:  NAD, alert and oriented, GARCIA equally, tongue midline  RRR  Lungs clear  Abd soft, benign  Vascular: Palpable bilateral radial pulses  Skin: Warm to touch, dry    Results Review:     CBC    Results from last 7 days   Lab Units 24  0509 24  0455 24  1740   WBC 10*3/mm3 5.52 5.87 6.81   HEMOGLOBIN g/dL 14.7 13.1 14.5   PLATELETS 10*3/mm3 223 226 266     BMP   Results from last 7 days   Lab Units 24  0509 24  0455 24  1740   SODIUM mmol/L 139 141 139   POTASSIUM mmol/L 3.9 3.8 4.4   CHLORIDE mmol/L 107 108* 104   CO2 mmol/L 23.0 24.1 23.9   BUN mg/dL 10 10 12   CREATININE mg/dL 0.93 0.90 0.87   GLUCOSE mg/dL 112* 130* 132*   MAGNESIUM  mg/dL  --  2.0  --      HbA1C   Lab Results   Component Value Date    HGBA1C 7.15 (H) 06/23/2024    HGBA1C 6.10 (H) 12/13/2023    HGBA1C 6.50 (H) 06/12/2023     Infection     Radiology(recent) Duplex Carotid Ultrasound CAR    Addendum Date: 6/23/2024      Right internal carotid artery is occluded.   Left internal carotid artery demonstrates greater than 70% stenosis but less than near occlusion.  Velocities may be overestimated secondary to contralateral occlusion.     CT Angiogram Head    Result Date: 6/23/2024  1.Examination is limited due to motion artifact. 2.There is occlusion of the right ICA from its origin. Reconstituted trace flow within the distal cervical ICA and petrous/cavernous ICA. Right supraclinoid ICA appears patent. Right MCA appears unremarkable. Diminutive or severely stenotic A1 segment of  the right CARLOS. 3.There is estimated 60% stenosis within the left proximal ICA approximately 1.5 cm from its origin (see series 13, images 78 through 81). 4.Please see above for additional details. Electronically Signed: Carlos Quispe MD  6/23/2024 7:20 PM EDT  Workstation ID: [id number]    CT Angiogram Carotids    Result Date: 6/23/2024  1.Examination is limited due to motion artifact. 2.There is occlusion of the right ICA from its origin. Reconstituted trace flow within the distal cervical ICA and petrous/cavernous ICA. Right supraclinoid ICA appears patent. Right MCA appears unremarkable. Diminutive or severely stenotic A1 segment of  the right CARLOS. 3.There is estimated 60% stenosis within the left proximal ICA approximately 1.5 cm from its origin (see series 13, images 78 through 81). 4.Please see above for additional details. Electronically Signed: Carlos Quispe MD  6/23/2024 7:20 PM EDT  Workstation ID: [id number]    XR Chest 1 View    Result Date: 6/22/2024  Impression: Stable cardiomegaly without acute process. Electronically Signed: Robinson Cbaello MD  6/22/2024 6:42 PM EDT  Workstation ID:  [id number]    XR Knee 4+ View Left    Result Date: 6/22/2024  Impression: 1. Negative for fracture. 2. Intact left knee prosthesis. Electronically Signed: Robinson Cabello MD  6/22/2024 6:41 PM EDT  Workstation ID: [id number]    CT Head Without Contrast    Result Date: 6/22/2024  Impression: 1. No acute intracranial abnormality. 2. Remote left cerebellar infarct. Electronically Signed: Robinson Cabello MD  6/22/2024 5:56 PM EDT  Workstation ID: [id number]     Duplex Carotid Ultrasound CAR (06/23/2024 12:50)     Right internal carotid artery is occluded.    Left internal carotid artery demonstrates greater than 70% stenosis but less than near occlusion.  Velocities may be overestimated secondary to contralateral occlusion.    VTE Prophylaxis:  Mechanical VTE prophylaxis orders are present.    Problems:    Active Hospital Problems:  Active Hospital Problems    Diagnosis  POA    **Syncope [R55]  Yes    Right carotid artery occlusion [I65.21]  Yes    Left carotid stenosis [I65.22]  Yes      Resolved Hospital Problems   No resolved problems to display.        Assessment & Plan   Assessment / Plan     Syncope    Right carotid artery occlusion    Left carotid stenosis      64 y.o. male who presents after a syncopal episode at home  CT head negative  Carotid duplex shows an occluded right carotid artery, >70% stenosis in the left ICA, PSV of 270  cm/s  CTA neck confirms right ICA occlusion, ~60% stenosis of the left ICA  He remains neurologically intact, without focal deficit  Will await neurology's evaluation to determine final plan of care  Cardiology following, feels syncope is related to orthostatic hypotension and dehydration  For now, continue aspirin, plavix, and statin  Continue supportive care        JA Gee  [facility]  06/24/24   O: [phone]  F: [phone]

## 2024-06-24 NOTE — PROGRESS NOTES
CARDIOLOGY PROGRESS NOTE:    Flaco Yan  64 y.o.  male  1960  [id number]      Referring Provider: Hospitalist    Reason for follow-up: Dizziness     Patient Care Team:  Mike Redd MD as PCP - General (Family Medicine)  Hubert Fuller MD as Consulting Physician (Cardiology)    Subjective .  Patient is doing well without any symptoms today    Objective lying in bed comfortably     Review of Systems   Constitutional: Negative for fever and malaise/fatigue.   HENT:  Negative for ear pain and nosebleeds.    Eyes:  Negative for blurred vision and double vision.   Cardiovascular:  Negative for chest pain, dyspnea on exertion and palpitations.   Respiratory:  Negative for cough and shortness of breath.    Skin:  Negative for rash.   Musculoskeletal:  Negative for joint pain.   Gastrointestinal:  Negative for abdominal pain, nausea and vomiting.   Neurological:  Negative for focal weakness and headaches.   Psychiatric/Behavioral:  Negative for depression. The patient is not nervous/anxious.    All other systems reviewed and are negative.      Allergies: Patient has no known allergies.    Scheduled Meds:aspirin, 81 mg, Oral, Nightly  clopidogrel, 75 mg, Oral, Daily  lisinopril, 10 mg, Oral, Q PM  metoprolol tartrate, 50 mg, Oral, BID  pantoprazole, 40 mg, Oral, Q AM  rosuvastatin, 10 mg, Oral, Nightly  sodium chloride, 10 mL, Intravenous, Q12H      Continuous Infusions:   PRN Meds:.  acetaminophen **OR** acetaminophen **OR** acetaminophen    senna-docusate sodium **AND** polyethylene glycol **AND** bisacodyl **AND** bisacodyl    LORazepam    melatonin    methocarbamol    nitroglycerin    ondansetron ODT **OR** ondansetron    sodium chloride    sodium chloride    sodium chloride    sodium chloride        VITAL SIGNS  Vitals:    06/23/24 1752 06/23/24 2143 06/24/24 0454 06/24/24 0741   BP: 133/78 146/91 150/89 156/92   BP Location: Left arm Left arm Left arm Left arm   Patient Position: Sitting Lying Lying  "Lying   Pulse: 73 68 60 70   Resp: 20 20 16 17   Temp: 98.4 °F (36.9 °C) 97.5 °F (36.4 °C) 97.3 °F (36.3 °C) 97.8 °F (36.6 °C)   TempSrc: Oral Oral Oral Oral   SpO2: 94% 95% 97% 96%   Weight:   134 kg (295 lb 6.7 oz)    Height:           Flowsheet Rows      Flowsheet Row First Filed Value   Admission Height 177.8 cm (70\") Documented at 06/22/2024 1551   Admission Weight 135 kg (296 lb 11.8 oz) Documented at 06/22/2024 1551             TELEMETRY: Sinus rhythm with nonspecific ST segment abnormality    Physical Exam:  Constitutional:       Appearance: Well-developed.   Eyes:      General: No scleral icterus.     Conjunctiva/sclera: Conjunctivae normal.      Pupils: Pupils are equal, round, and reactive to light.   HENT:      Head: Normocephalic and atraumatic.   Neck:      Vascular: No carotid bruit or JVD.   Pulmonary:      Effort: Pulmonary effort is normal.      Breath sounds: Normal breath sounds. No wheezing. No rales.   Cardiovascular:      Normal rate. Regular rhythm.   Pulses:     Intact distal pulses.   Abdominal:      General: Bowel sounds are normal.      Palpations: Abdomen is soft.   Musculoskeletal: Normal range of motion.      Cervical back: Normal range of motion and neck supple. Skin:     General: Skin is warm and dry.      Findings: No rash.   Neurological:      Mental Status: Alert.      Comments: No focal deficits          Results Review:   I reviewed the patient's new clinical results.  Lab Results (last 24 hours)       Procedure Component Value Units Date/Time    Basic Metabolic Panel [[id number]]  (Abnormal) Collected: 06/24/24 0509    Specimen: Blood from Arm, Right Updated: 06/24/24 0610     Glucose 112 mg/dL      BUN 10 mg/dL      Creatinine 0.93 mg/dL      Sodium 139 mmol/L      Potassium 3.9 mmol/L      Chloride 107 mmol/L      CO2 23.0 mmol/L      Calcium 8.9 mg/dL      BUN/Creatinine Ratio 10.8     Anion Gap 9.0 mmol/L      eGFR 92.8 mL/min/1.73     Narrative:      GFR Normal >60  Chronic " Kidney Disease <60  Kidney Failure <15      CBC & Differential [[id number]]  (Normal) Collected: 06/24/24 0509    Specimen: Blood from Arm, Right Updated: 06/24/24 0547    Narrative:      The following orders were created for panel order CBC & Differential.  Procedure                               Abnormality         Status                     ---------                               -----------         ------                     CBC Auto Differential[[id number]]        Normal              Final result                 Please view results for these tests on the individual orders.    CBC Auto Differential [[id number]]  (Normal) Collected: 06/24/24 0509    Specimen: Blood from Arm, Right Updated: 06/24/24 0547     WBC 5.52 10*3/mm3      RBC 4.86 10*6/mm3      Hemoglobin 14.7 g/dL      Hematocrit 44.4 %      MCV 91.4 fL      MCH 30.2 pg      MCHC 33.1 g/dL      RDW 12.5 %      RDW-SD 41.1 fl      MPV 10.9 fL      Platelets 223 10*3/mm3      Neutrophil % 44.4 %      Lymphocyte % 43.1 %      Monocyte % 6.9 %      Eosinophil % 4.5 %      Basophil % 0.9 %      Immature Grans % 0.2 %      Neutrophils, Absolute 2.45 10*3/mm3      Lymphocytes, Absolute 2.38 10*3/mm3      Monocytes, Absolute 0.38 10*3/mm3      Eosinophils, Absolute 0.25 10*3/mm3      Basophils, Absolute 0.05 10*3/mm3      Immature Grans, Absolute 0.01 10*3/mm3      nRBC 0.0 /100 WBC             Imaging Results (Last 24 Hours)       Procedure Component Value Units Date/Time    CT Angiogram Head [[id number]] Collected: 06/23/24 1908     Updated: 06/23/24 1922    Narrative:      CT ANGIOGRAM HEAD, CT ANGIOGRAM CAROTIDS    Date of Exam: 6/23/2024 6:26 PM EDT    Indication: Right ICA occluded on ultrasound and left ICA greater than 70%.    Comparison: Noncontrast head CT dated 6/22/2024    Technique: CTA of the head was performed after the uneventful intravenous administration of iodinated contrast. Reconstructed coronal and sagittal images were also obtained. In  addition, a 3-D volume rendered image was created for interpretation.   Automated exposure control and iterative reconstruction methods were used.    FINDINGS:    Vascular Findings:  Examination is somewhat limited due to motion artifact    There is occlusion of the right ICA from its origin. Reconstituted trace flow within the distal cervical ICA and petrous/cavernous ICA. Right supraclinoid ICA appears patent. Right MCA appears unremarkable. Diminutive or severely stenotic A1 segment of   the right CARLOS. Remainder of the right CARLOS appears unremarkable. Right common carotid artery appears unremarkable. Basilar artery and right posterior cerebral artery appear unremarkable.    There is moderate plaque within the left carotid bifurcation/proximal ICA. There is estimated 60% stenosis within the left proximal ICA approximately 1.5 cm from its origin (see series 13, images 78 through 81). Left common carotid, remainder of the left   ICA, middle cerebral, anterior cerebral, vertebral, and posterior cerebral arteries are patent without abrupt cut off or aneurysmal dilation.      Non-vascular Findings:    For description of nonvascular intracranial findings, please refer to the noncontrast head CT performed 6/22/2024.    No acute abnormality is identified within the visualized soft tissue or bony structures of the neck.    The visualized lung apices are clear.      Impression:      1.Examination is limited due to motion artifact.  2.There is occlusion of the right ICA from its origin. Reconstituted trace flow within the distal cervical ICA and petrous/cavernous ICA. Right supraclinoid ICA appears patent. Right MCA appears unremarkable. Diminutive or severely stenotic A1 segment of   the right CARLOS.  3.There is estimated 60% stenosis within the left proximal ICA approximately 1.5 cm from its origin (see series 13, images 78 through 81).  4.Please see above for additional details.        Electronically Signed: Carlos Quispe  MD    6/23/2024 7:20 PM EDT    Workstation ID: [id number]    CT Angiogram Carotids [[id number]] Collected: 06/23/24 1908     Updated: 06/23/24 1922    Narrative:      CT ANGIOGRAM HEAD, CT ANGIOGRAM CAROTIDS    Date of Exam: 6/23/2024 6:26 PM EDT    Indication: Right ICA occluded on ultrasound and left ICA greater than 70%.    Comparison: Noncontrast head CT dated 6/22/2024    Technique: CTA of the head was performed after the uneventful intravenous administration of iodinated contrast. Reconstructed coronal and sagittal images were also obtained. In addition, a 3-D volume rendered image was created for interpretation.   Automated exposure control and iterative reconstruction methods were used.    FINDINGS:    Vascular Findings:  Examination is somewhat limited due to motion artifact    There is occlusion of the right ICA from its origin. Reconstituted trace flow within the distal cervical ICA and petrous/cavernous ICA. Right supraclinoid ICA appears patent. Right MCA appears unremarkable. Diminutive or severely stenotic A1 segment of   the right CARLOS. Remainder of the right CARLOS appears unremarkable. Right common carotid artery appears unremarkable. Basilar artery and right posterior cerebral artery appear unremarkable.    There is moderate plaque within the left carotid bifurcation/proximal ICA. There is estimated 60% stenosis within the left proximal ICA approximately 1.5 cm from its origin (see series 13, images 78 through 81). Left common carotid, remainder of the left   ICA, middle cerebral, anterior cerebral, vertebral, and posterior cerebral arteries are patent without abrupt cut off or aneurysmal dilation.      Non-vascular Findings:    For description of nonvascular intracranial findings, please refer to the noncontrast head CT performed 6/22/2024.    No acute abnormality is identified within the visualized soft tissue or bony structures of the neck.    The visualized lung apices are clear.      Impression:    "   1.Examination is limited due to motion artifact.  2.There is occlusion of the right ICA from its origin. Reconstituted trace flow within the distal cervical ICA and petrous/cavernous ICA. Right supraclinoid ICA appears patent. Right MCA appears unremarkable. Diminutive or severely stenotic A1 segment of   the right CARLOS.  3.There is estimated 60% stenosis within the left proximal ICA approximately 1.5 cm from its origin (see series 13, images 78 through 81).  4.Please see above for additional details.        Electronically Signed: Carlos Quispe MD    6/23/2024 7:20 PM EDT    Workstation ID: [id number]            EKG      I personally viewed and interpreted the patient's EKG/Telemetry data:    ECHOCARDIOGRAM:       STRESS MYOVIEW:  Results for orders placed during the hospital encounter of 06/17/20    Stress Test With Myocardial Perfusion One Day    Interpretation Summary  · Left ventricular ejection fraction is normal (Calculated EF = 59%).  · Myocardial perfusion imaging indicates a normal myocardial perfusion study with no evidence of ischemia.  · Impressions are consistent with a low risk study.       CARDIAC CATHETERIZATION:  No results found for this or any previous visit.       OTHER:         Assessment & Plan     Syncope  Patient presents with dizziness and syncope and is ruled out for MI by EKG and enzymes  Patient probably has orthostatic hypotension and dehydration.  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  Patient is seen by vascular surgeons and is on medical therapy because he does not have any neurological deficits.  Neurology is following the patient and will do MRI or CT angiogram and then if he has a stroke then he can have surgery because it benefits him then.     Coronary artery disease  Patient is \"status post coronary bypass surgery x 3 vessels with a LIMA " to LAD and a saphenous graft to the marginal branch and RCA and is currently stable without any angina.  Patient is ruled out for MI by EKG and enzymes and will continue on current medical therapy and has normal LV function by echocardiogram.     Hypertension  Patient blood pressure is currently stable on medications and is being ruled out for orthostatic hypotension     Hyperlipidemia  Patient has been on statins and the lipid levels are followed by the primary care doctor.    I discussed the patients findings and my recommendations with patient and nurse    Hubert Fuller MD  06/24/24  11:56 EDT

## 2024-06-25 ENCOUNTER — TRANSITIONAL CARE MANAGEMENT TELEPHONE ENCOUNTER (OUTPATIENT)
Dept: CALL CENTER | Facility: HOSPITAL | Age: 64
End: 2024-06-25
Payer: MEDICARE

## 2024-06-25 NOTE — OUTREACH NOTE
Prep Survey      Flowsheet Row Responses   [facility] patient discharged from? [facility]   Is LACE score < 7 ? Yes   Eligibility [facility]   Date of Admission 06/22/24   Date of Discharge 06/24/24   Discharge Disposition Home or Self Care   Discharge diagnosis Syncope   Does the patient have one of the following disease processes/diagnoses(primary or secondary)? Other   Does the patient have Home health ordered? No   Is there a DME ordered? No   Prep survey completed? Yes            Lelia STEPHEN - Registered Nurse

## 2024-06-25 NOTE — CASE MANAGEMENT/SOCIAL WORK
Case Management Discharge Note      Final Note: Routine home         Selected Continued Care - Discharged on 6/24/2024 Admission date: 6/22/2024 - Discharge disposition: Home or Self Care       Transportation Services  Private: Car    Final Discharge Disposition Code: 01 - home or self-care

## 2024-06-25 NOTE — OUTREACH NOTE
Call Center TCM Note      Flowsheet Row Responses   [facility] patient discharged from? [facility]   Does the patient have one of the following disease processes/diagnoses(primary or secondary)? Other   TCM attempt successful? Yes   Call start time 1108   Call end time 1112   Discharge diagnosis Syncope   Meds reviewed with patient/caregiver? Yes   Is the patient having any side effects they believe may be caused by any medication additions or changes? No   Does the patient have all medications ordered at discharge? No   Prescription comments New rx Clopidogrel and updated Rosuvastatin are pending. One requires P.A., one had to be ordered by pt [facility]. Pt unsure which is which. He is hoping they will be ready today. Amlodipine discontinued   Comments Pt very much wants TCM APPT but wishes to only see PCP Dr Mike Redd if office can please call pt re this appt.   Does the patient have an appointment with their PCP within 7-14 days of discharge? No appointments available   Nursing Interventions Routed TCM call to PCP office, PCP office requested to make appointment - message sent   Has home health visited the patient within 72 hours of discharge? N/A   Psychosocial issues? No   Did the patient receive a copy of their discharge instructions? Yes   Nursing interventions Reviewed instructions with patient   What is the patient's perception of their health status since discharge? Improving   Is the patient/caregiver able to teach back signs and symptoms related to disease process for when to call PCP? Yes   Is the patient/caregiver able to teach back signs and symptoms related to disease process for when to call 911? Yes   Is the patient/caregiver able to teach back the hierarchy of who to call/visit for symptoms/problems? PCP, Specialist, Home health nurse, Urgent Care, ED, 911 Yes   If the patient is a current smoker, are they able to teach back resources for cessation? Not a smoker   TCM call completed? Yes    Wrap up additional comments D/C DX: syncope,  vascular stenosis - Pt is feeling ok. No questions at this time. Pt very much wants TCM APPT but wishes to only see PCP Dr Mike Redd if office can please call pt re this appt.   Call end time 1116            Desiree Shankar MA    6/25/2024, 11:49 EDT

## 2024-06-28 ENCOUNTER — OFFICE VISIT (OUTPATIENT)
Dept: FAMILY MEDICINE CLINIC | Facility: CLINIC | Age: 64
End: 2024-06-28
Payer: MEDICARE

## 2024-06-28 VITALS
SYSTOLIC BLOOD PRESSURE: 116 MMHG | OXYGEN SATURATION: 96 % | HEART RATE: 75 BPM | RESPIRATION RATE: 16 BRPM | HEIGHT: 70 IN | DIASTOLIC BLOOD PRESSURE: 102 MMHG | WEIGHT: 297 LBS | BODY MASS INDEX: 42.52 KG/M2

## 2024-06-28 DIAGNOSIS — I10 PRIMARY HYPERTENSION: ICD-10-CM

## 2024-06-28 DIAGNOSIS — R73.03 PREDIABETES: ICD-10-CM

## 2024-06-28 DIAGNOSIS — I65.22 LEFT CAROTID STENOSIS: Primary | ICD-10-CM

## 2024-06-28 DIAGNOSIS — E66.01 MORBID (SEVERE) OBESITY DUE TO EXCESS CALORIES: ICD-10-CM

## 2024-06-28 DIAGNOSIS — E78.2 MODERATE MIXED HYPERLIPIDEMIA NOT REQUIRING STATIN THERAPY: ICD-10-CM

## 2024-06-28 RX ORDER — ALPRAZOLAM 0.25 MG/1
TABLET ORAL
Qty: 30 TABLET | Refills: 1 | Status: SHIPPED | OUTPATIENT
Start: 2024-06-28

## 2024-06-28 RX ORDER — METFORMIN HYDROCHLORIDE 500 MG/1
1000 TABLET, EXTENDED RELEASE ORAL
Qty: 60 TABLET | Refills: 6 | Status: SHIPPED | OUTPATIENT
Start: 2024-06-28

## 2024-06-28 NOTE — PROGRESS NOTES
Transitional Care Follow Up Visit  Subjective     Flaco Yan is a 64 y.o. male who presents for a transitional care management visit.    Within 48 business hours after discharge our office contacted him via telephone to coordinate his care and needs.      I reviewed and discussed the details of that call along with the discharge summary, hospital problems, inpatient lab results, inpatient diagnostic studies, and consultation reports with Flaco.     Current outpatient and discharge medications have been reconciled for the patient.  Reviewed by: Mike Redd MD          6/24/2024     8:48 PM   Date of TCM Phone Call   [facility]   Date of Admission 6/22/2024   Date of Discharge 6/24/2024   Discharge Disposition Home or Self Care     Risk for Readmission (LACE) Score: 4 (6/24/2024  6:00 AM)       Course During Hospital Stay:   Patient was admitted to the hospital after sudden profound syncopal spell.  No warning of impending syncope and he does not remember any palpitations chest pain or dyspnea prior to the event.  He was standing by a microwave waiting for the timer to go off and the next thing he remembers he was on the ground.  No injury from the fall.  He called his sister who then brought him to the emergency room for evaluation.  He was admitted for possible stroke versus cardiac arrhythmia.    His hospital stay consisted of continuous monitoring and he underwent CT scans and MRIs of the brain.  He underwent CTAs of his carotids which revealed a completely blocked the left internal carotid and a 60% blocked right internal carotid.  MRI showed a small cerebellar infarct of unknown age and a right temporal lobe infarct of undetermined age.  On MRI they both looked old.  Patient had no further symptoms and after several days he was discharged to home.  He was asked to follow-up with us today.  History of Present Illness  The patient is a 64-year-old male who presents for evaluation of  multiple medical concerns.  Recent admission to the hospital for sudden syncope.    The patient experienced a fall in his kitchen last Saturday, during which he lost consciousness on the floor. He was hospitalized from Saturday to Monday due to dehydration and overheating. His carotids, including a CT scan and MRI, revealed a blockage in his right carotid artery. Despite the absence of fractures, he landed on his prosthetic knee. He lost consciousness for approximately 10 minutes. A CT scan of his head, chest x-ray, and knee x-rays were performed. A CT arteriogram revealed 100 percent blockage on the right side, while the Doppler study indicated 70 percent blockage. A CT angiogram indicated 100 percent blockage on the left side. A vascular surgeon informed him that they will only operate if the blockage is less than 70 percent. The patient has a history of left-sided cerebellar stroke, the exact date of the stroke remains uncertain. An MRI conducted on 06/24/2023 revealed an old left cerebellar stroke, no acute intracranial pathology, chronic small vessel microangiopathic ischemic changes, no area of restricted diffusion, old left cerebellar infarct, old right temporal lobe infarct, and multifocal areas of T2 flair signal increase within the subcortical deep seborrcerebral and periventricular white matter consistent with chronic small vessel microangiopathic microangiopathic ischemic changes. Ventricles and sulci are normal. No intracranial mass-effect, loss of normal right internal carotid flow, normal appearing left internal carotid artery flow void, orbital and periorbital soft tissue are normal.    The patient's lisinopril dosage was increased from 5 mg to 10 mg during his hospital stay. His blood pressure was noted to be elevated during this visit.    The patient admits to inadequate hydration.         Review of Systems   Constitutional: Negative.  Negative for diaphoresis, fatigue and fever.   HENT: Negative.   Negative for congestion, ear pain, hearing loss, postnasal drip, sinus pressure, sore throat, trouble swallowing and voice change.    Eyes: Negative.  Negative for pain, redness and visual disturbance.   Respiratory: Negative.  Negative for cough, chest tightness and shortness of breath.    Cardiovascular: Negative.  Negative for chest pain, palpitations and leg swelling.   Gastrointestinal: Negative.  Negative for abdominal pain, blood in stool, constipation and diarrhea.   Endocrine: Negative.  Negative for cold intolerance and heat intolerance.   Genitourinary: Negative.  Negative for difficulty urinating, enuresis, flank pain, frequency and urgency.   Musculoskeletal: Negative.  Negative for arthralgias, back pain, myalgias, neck pain and neck stiffness.   Skin: Negative.  Negative for color change and rash.   Allergic/Immunologic: Negative.  Negative for environmental allergies.   Neurological: Negative.  Negative for syncope, weakness and headaches.   Hematological: Negative.  Does not bruise/bleed easily.   Psychiatric/Behavioral: Negative.  Negative for behavioral problems, decreased concentration, dysphoric mood and suicidal ideas. The patient is not nervous/anxious.      Results  Laboratory Studies  A1c was 7.1. Total cholesterol was 144, LDL was 92, HDL was 30.    Imaging  Carotid ultrasound showed a complete left carotid stenosis with a right internal carotid that has a 60% lesion. MRI showed an occipital infarct of age in indeterminate and also a right temporal lobe infarct, small and undetermined age.    Objective   Physical Exam  Constitutional:       Appearance: Normal appearance. He is well-developed. He is obese.   HENT:      Head: Normocephalic and atraumatic.      Right Ear: Tympanic membrane, ear canal and external ear normal.      Left Ear: Tympanic membrane, ear canal and external ear normal.      Nose: Nose normal.      Mouth/Throat:      Mouth: Mucous membranes are moist.      Pharynx:  Oropharynx is clear. No oropharyngeal exudate.   Eyes:      Extraocular Movements: Extraocular movements intact.      Conjunctiva/sclera: Conjunctivae normal.      Pupils: Pupils are equal, round, and reactive to light.   Neck:      Comments: Weak carotid pulses but I do not hear any bruits  Cardiovascular:      Rate and Rhythm: Normal rate and regular rhythm.      Pulses: Normal pulses.      Heart sounds: Normal heart sounds.   Pulmonary:      Effort: Pulmonary effort is normal.      Breath sounds: Normal breath sounds.   Abdominal:      General: Bowel sounds are normal.      Palpations: Abdomen is soft.      Comments: Morbid obesity   Musculoskeletal:         General: Normal range of motion.      Cervical back: Normal range of motion and neck supple.   Skin:     General: Skin is warm and dry.   Neurological:      General: No focal deficit present.      Mental Status: He is alert and oriented to person, place, and time. Mental status is at baseline.   Psychiatric:         Mood and Affect: Mood normal.         Behavior: Behavior normal.         Thought Content: Thought content normal.         Judgment: Judgment normal.           Assessment & Plan  The patient is a 64-year-old white male, date of his visit 06/28/2024.    1. Left total carotid stenosis.  The patient, a 64-year - old white male, experienced a syncopal episode approximately a week ago while preparing for an item in his kitchen. He does not recall any symptoms that led to the event, prompting him to contact his sister, a nurse, who took him to the hospital. He was subsequently admitted, during which a complete left carotid stenosis was identified, along with a 60 percent lesion in the right internal carotid. A consultation with a vascular surgeon was conducted, but no procedures were deemed necessary at that time. An MRI revealed an occipital infarct of age in indeterminate and a right temporal lobe infarct, both small and undetermined age. Since then,  the patient has reported no symptoms, and the primary objective of treatment is to diminish all risk factors.    2. Hyperlipidemia.  The patient is currently on a regimen of rosuvastatin 10 mg daily, which will be continued indefinitely. His lipid panel was satisfactory, with an LDL level in the 90s, but it is crucial to reach the 60s if feasible. The dosage will be increased based on lab tests conducted in approximately 3 months.    3. Hypertension.  The patient's blood pressure reading today was 145/95. His lisinopril dosage will be increased until his readings decrease to a more optimal level. The target blood pressure is in the 120s and the diastolic pressure in the 70s, if possible.    4. Morbid obesity.  The patient's BMI was 42.6. He is advised to reduce his weight through a low carb diet, reduce his calorie intake, and engage in regular exercise. This is expected to show improvement within the next 3 months.    5. Prediabetes.  The patient's A1c level is above 6.6. Metformin will be prescribed, to be taken twice daily. This will hopefully improve his condition. Reducing risk factors is currently the only viable option for his central nervous system blockages.    Follow-up  A follow-up appointment is scheduled for 3 months from now, during which weight, blood pressure, and A1c will be repeated.           Patient or patient representative verbalized consent for the use of Ambient Listening during the visit with  Mike Redd MD for chart documentation. 6/29/2024  12:02 EDT

## 2024-07-01 ENCOUNTER — TELEPHONE (OUTPATIENT)
Dept: NEUROLOGY | Facility: CLINIC | Age: 64
End: 2024-07-01

## 2024-07-01 NOTE — TELEPHONE ENCOUNTER
Caller: Flaco Yan    Relationship to patient: Self    Best call back number:   Telephone Information:   Mobile [phone]   NO VM IS SET UP    New or established patient?  [x] New  [] Established    Date of discharge: 6-24-24    Facility discharged from:  [facility]    Diagnosis/Symptoms: SYNCOPE    Length of stay (If applicable): 2 DAYS    Specialty Only: Did you see a [facility] provider?    [x] Yes  [] No  If so, who? Amalia Wade MD      [facility] DID SEND A REFERRAL AS WELL. PLEASE REVIEW AND ADVISE. CAN THIS BE SCHEDULED IN [place] AS HE IS CURRENTLY SET UP WITH  FOR 8-12-24

## 2024-08-02 ENCOUNTER — TELEPHONE (OUTPATIENT)
Dept: FAMILY MEDICINE CLINIC | Facility: CLINIC | Age: 64
End: 2024-08-02
Payer: MEDICARE

## 2024-08-02 NOTE — TELEPHONE ENCOUNTER
----- Message from Chastity CINTRON sent at 8/2/2024  4:20 PM EDT -----  Pls call patient and cancel his lab appt, he can still keep his appt with dr redd  ----- Message -----  From: Mike Redd MD  Sent: 8/2/2024   4:16 PM EDT  To: LANA Galvin he does not need any more labs.  ----- Message -----  From: Chastity Whaley MA  Sent: 8/2/2024   4:02 PM EDT  To: Mike Redd MD    This patient is scheduled for labs next week for their upcoming [facility], looks like he was in the hospital and had full labs and then some on 6/23, do you want him to have new labs done as well?

## 2024-08-05 NOTE — TELEPHONE ENCOUNTER
Gave message to patient at 9:34am and [facility]

## 2024-08-08 ENCOUNTER — TELEPHONE (OUTPATIENT)
Dept: FAMILY MEDICINE CLINIC | Facility: CLINIC | Age: 64
End: 2024-08-08
Payer: MEDICARE

## 2024-08-08 RX ORDER — CLOPIDOGREL BISULFATE 75 MG/1
75 TABLET ORAL DAILY
Qty: 30 TABLET | Refills: 0 | Status: SHIPPED | OUTPATIENT
Start: 2024-08-08

## 2024-08-08 RX ORDER — LISINOPRIL 10 MG/1
10 TABLET ORAL EVERY EVENING
Qty: 90 TABLET | Refills: 3 | Status: SHIPPED | OUTPATIENT
Start: 2024-08-08

## 2024-08-08 RX ORDER — ROSUVASTATIN CALCIUM 10 MG/1
10 TABLET, COATED ORAL NIGHTLY
Qty: 90 TABLET | Refills: 3 | Status: SHIPPED | OUTPATIENT
Start: 2024-08-08

## 2024-08-08 NOTE — TELEPHONE ENCOUNTER
Yes change the dose of both of these medicines to 10 mg each.  I went ahead and called in some new prescriptions if he needs them for the 10 mg strength of each.  He can use up what he has left of the 5 mg by taking 2 of each until they are gone.

## 2024-08-08 NOTE — TELEPHONE ENCOUNTER
Caller: [facility] [id number] - [place], [place] - [address] [facility] [address] - [phone] [facility] [phone]   ARIAN  Relationship: Pharmacy    Best call back number: [phone]    Requested Prescriptions:   Requested Prescriptions     Pending Prescriptions Disp Refills    clopidogrel (PLAVIX) 75 MG tablet 30 tablet 0     Sig: Take 1 tablet by mouth Daily.        Pharmacy where request should be sent: [facility] [id number] - [place], [place] - [address] [facility] [address] - [phone] [facility] [phone]     Last office visit with prescribing clinician: 6/28/2024   Last telemedicine visit with prescribing clinician: Visit date not found   Next office visit with prescribing clinician: 8/14/2024     Additional details provided by patient: WILL NEED FILLED     Does the patient have less than a 3 day supply:  [x] Yes  [] No    Would you like a call back once the refill request has been completed: [] Yes [x] No    If the office needs to give you a call back, can they leave a voicemail: [] Yes [x] No    Jefry Ga   08/08/24 15:56 EDT

## 2024-08-08 NOTE — TELEPHONE ENCOUNTER
Pharmacy Name: [facility] [id number] - [place], [place] - [address] - [phone]  - [phone]      Reference Number (if applicable): N/A    Pharmacy representative name: ARIAN    Pharmacy representative phone number: [phone]     What medication are you calling in regards to: lisinopril (PRINIVIL,ZESTRIL) 5 MG tablet AND rosuvastatin (CRESTOR) 5 MG tablet     What question does the pharmacy have: DOSAGE OF MEDICATION     Who is the provider that prescribed the medication: DR. ORALIA KC    Additional notes: PATIENT STATES THAT BOTH MEDICATIONS WERE INCREASED TO 10 MG.    PLEASE CALL TO ADVISE.

## 2024-08-14 ENCOUNTER — OFFICE VISIT (OUTPATIENT)
Dept: FAMILY MEDICINE CLINIC | Facility: CLINIC | Age: 64
End: 2024-08-14
Payer: MEDICARE

## 2024-08-14 VITALS
DIASTOLIC BLOOD PRESSURE: 108 MMHG | WEIGHT: 293 LBS | HEIGHT: 70 IN | BODY MASS INDEX: 41.95 KG/M2 | OXYGEN SATURATION: 95 % | HEART RATE: 67 BPM | RESPIRATION RATE: 16 BRPM | SYSTOLIC BLOOD PRESSURE: 176 MMHG

## 2024-08-14 DIAGNOSIS — E66.01 MORBID (SEVERE) OBESITY DUE TO EXCESS CALORIES: ICD-10-CM

## 2024-08-14 DIAGNOSIS — E78.2 MODERATE MIXED HYPERLIPIDEMIA NOT REQUIRING STATIN THERAPY: ICD-10-CM

## 2024-08-14 DIAGNOSIS — G47.33 OBSTRUCTIVE SLEEP APNEA: ICD-10-CM

## 2024-08-14 DIAGNOSIS — I10 PRIMARY HYPERTENSION: ICD-10-CM

## 2024-08-14 DIAGNOSIS — I25.708 CORONARY ARTERY DISEASE OF BYPASS GRAFT OF NATIVE HEART WITH STABLE ANGINA PECTORIS: ICD-10-CM

## 2024-08-14 DIAGNOSIS — Z00.00 MEDICARE ANNUAL WELLNESS VISIT, SUBSEQUENT: Primary | ICD-10-CM

## 2024-08-14 DIAGNOSIS — T67.1XXA HEAT SYNCOPE, INITIAL ENCOUNTER: ICD-10-CM

## 2024-08-14 DIAGNOSIS — Z96.652 S/P TOTAL KNEE ARTHROPLASTY, LEFT: ICD-10-CM

## 2024-08-14 PROBLEM — I21.3 ST ELEVATION (STEMI) MYOCARDIAL INFARCTION: Status: RESOLVED | Noted: 2019-07-31 | Resolved: 2024-08-14

## 2024-08-14 PROBLEM — R79.9 ABNORMAL FINDING OF BLOOD CHEMISTRY, UNSPECIFIED: Status: RESOLVED | Noted: 2023-12-13 | Resolved: 2024-08-14

## 2024-08-14 PROBLEM — E34.9 TESTOSTERONE DEFICIENCY: Status: RESOLVED | Noted: 2017-04-19 | Resolved: 2024-08-14

## 2024-08-14 PROBLEM — R73.03 PREDIABETES: Status: RESOLVED | Noted: 2024-06-28 | Resolved: 2024-08-14

## 2024-08-14 PROBLEM — I82.409 DVT OF LEG (DEEP VENOUS THROMBOSIS): Status: RESOLVED | Noted: 2020-06-25 | Resolved: 2024-08-14

## 2024-08-14 PROBLEM — M25.569 KNEE PAIN: Status: RESOLVED | Noted: 2017-11-10 | Resolved: 2024-08-14

## 2024-08-14 PROBLEM — M17.9 OSTEOARTHRITIS OF KNEE: Status: RESOLVED | Noted: 2017-11-10 | Resolved: 2024-08-14

## 2024-08-14 PROBLEM — E55.9 VITAMIN D DEFICIENCY, UNSPECIFIED: Status: RESOLVED | Noted: 2023-12-13 | Resolved: 2024-08-14

## 2024-08-14 NOTE — PROGRESS NOTES
Subjective   The ABCs of the Annual Wellness Visit  Medicare Wellness Visit      Flaco Yan is a 64 y.o. patient who presents for a Medicare Wellness Visit.    The following portions of the patient's history were reviewed and   updated as appropriate: allergies, current medications, past family history, past medical history, past social history, past surgical history, and problem list.    Compared to one year ago, the patient's physical   health is the same.  Compared to one year ago, the patient's mental   health is the same.    Recent Hospitalizations:  This patient has had a [facility] admission record on file within the last 365 days.  Current Medical Providers:  Patient Care Team:  Mike Redd MD as PCP - General (Family Medicine)  Hubert Fuller MD as Consulting Physician (Cardiology)  Fernie Hull MD as Consulting Physician (Urology)  Fernie Peoples II, MD as Consulting Physician (Orthopedic Surgery)  Dr Bev Julian as Consulting Physician (Dental General Practice)    Outpatient Medications Prior to Visit   Medication Sig Dispense Refill    aspirin 81 MG EC tablet Take 1 tablet by mouth Every Night.      clopidogrel (PLAVIX) 75 MG tablet Take 1 tablet by mouth Daily. 30 tablet 0    esomeprazole (nexIUM) 20 MG capsule Take 1 capsule by mouth Every Morning Before Breakfast.      lisinopril (PRINIVIL,ZESTRIL) 10 MG tablet Take 1 tablet by mouth Every Evening. 90 tablet 3    metFORMIN ER (GLUCOPHAGE-XR) 500 MG 24 hr tablet Take 2 tablets by mouth Daily With Breakfast. 60 tablet 6    metoprolol tartrate (LOPRESSOR) 50 MG tablet TAKE 1 TABLET BY MOUTH TWICE A  tablet 3    rosuvastatin (CRESTOR) 10 MG tablet Take 1 tablet by mouth Every Night. 90 tablet 3    ALPRAZolam (Xanax) 0.25 MG tablet One q 8h prn (Patient not taking: Reported on 8/14/2024) 30 tablet 1     No facility-administered medications prior to visit.     No opioid medication identified on active medication  "list. I have reviewed chart for other potential  high risk medication/s and harmful drug interactions in the elderly.      Aspirin is on active medication list. Aspirin use is indicated based on review of current medical condition/s. Pros and cons of this therapy have been discussed today. Benefits of this medication outweigh potential harm.  Patient has been encouraged to continue taking this medication.  .      Patient Active Problem List   Diagnosis    At risk for falls    Coronary artery disease    Hyperlipidemia    Morbid (severe) obesity due to excess calories    Polyosteoarthritis    Presence of aortocoronary bypass graft    S/P total knee arthroplasty, left    Obstructive sleep apnea    Hypertension    GERD (gastroesophageal reflux disease)    Medicare annual wellness visit, subsequent    Syncope    Right carotid artery occlusion    Left carotid stenosis     Advance Care Planning Advance Directive is not on file.  ACP discussion was held with the patient during this visit. Patient does not have an advance directive, information provided.            Objective   Vitals:    08/14/24 0929   BP: (!) 176/108   Pulse: 67   Resp: 16   SpO2: 95%   Weight: 133 kg (293 lb)   Height: 177.8 cm (70\")       Estimated body mass index is 42.04 kg/m² as calculated from the following:    Height as of this encounter: 177.8 cm (70\").    Weight as of this encounter: 133 kg (293 lb).            Does the patient have evidence of cognitive impairment? No  Lab Results   Component Value Date    TRIG 121 06/23/2024    HDL 30 (L) 06/23/2024    LDL 92 06/23/2024    VLDL 22 06/23/2024    HGBA1C 7.15 (H) 06/23/2024                                                                                                Health  Risk Assessment    Smoking Status:  Social History     Tobacco Use   Smoking Status Former    Current packs/day: 0.00    Average packs/day: 1 pack/day for 20.0 years (20.0 ttl pk-yrs)    Types: Cigarettes    Start date: " 1979    Quit date: 1999    Years since quittin.6    Passive exposure: Past   Smokeless Tobacco Never     Alcohol Consumption:  Social History     Substance and Sexual Activity   Alcohol Use No       Fall Risk Screen  STEADI Fall Risk Assessment was completed, and patient is at HIGH risk for falls. Assessment completed on:2024    Depression Screenin/14/2024     9:25 AM   PHQ-2/PHQ-9 Depression Screening   Little Interest or Pleasure in Doing Things 3-->nearly every day   Feeling Down, Depressed or Hopeless 3-->nearly every day   PHQ-9: Brief Depression Severity Measure Score 6     Health Habits and Functional and Cognitive Screenin/14/2024     9:26 AM   Functional & Cognitive Status   Do you have difficulty preparing food and eating? No   Do you have difficulty bathing yourself, getting dressed or grooming yourself? No   Do you have difficulty using the toilet? No   Do you have difficulty moving around from place to place? No   Do you have trouble with steps or getting out of a bed or a chair? No   Current Diet Unhealthy Diet   Dental Exam Up to date   Eye Exam Not up to date   Exercise (times per week) 0 times per week   Current Exercises Include No Regular Exercise   Do you need help using the phone?  No   Are you deaf or do you have serious difficulty hearing?  Yes   Do you need help to go to places out of walking distance? No   Do you need help shopping? No   Do you need help preparing meals?  No   Do you need help with housework?  No   Do you need help with laundry? No   Do you need help taking your medications? No   Do you need help managing money? No   Do you ever drive or ride in a car without wearing a seat belt? No   Have you felt unusual stress, anger or loneliness in the last month? Yes   Who do you live with? Alone   If you need help, do you have trouble finding someone available to you? No   Have you been bothered in the last four weeks by sexual problems? No   Do  you have difficulty concentrating, remembering or making decisions? No           Age-appropriate Screening Schedule:  Refer to the list below for future screening recommendations based on patient's age, sex and/or medical conditions. Orders for these recommended tests are listed in the plan section. The patient has been provided with a written plan.    Health Maintenance List  Health Maintenance   Topic Date Due    Pneumococcal Vaccine 0-64 (1 of 2 - PCV) Never done    ZOSTER VACCINE (1 of 2) Never done    HEPATITIS C SCREENING  Never done    ANNUAL WELLNESS VISIT  Never done    COVID-19 Vaccine (3 - 2023-24 season) 09/01/2023    INFLUENZA VACCINE  08/01/2024    LIPID PANEL  06/23/2025    BMI FOLLOWUP  08/14/2025    TDAP/TD VACCINES (2 - Td or Tdap) 08/29/2026    COLORECTAL CANCER SCREENING  08/24/2027                                                                                                                                                CMS Preventative Services Quick Reference  Risk Factors Identified During Encounter      The above risks/problems have been discussed with the patient.  Pertinent information has been shared with the patient in the After Visit Summary.  An After Visit Summary and PPPS were made available to the patient.    Follow Up:   Next Medicare Wellness visit to be scheduled in 1 year.         Additional E&M Note during same encounter follows:  Patient has additional, significant, and separately identifiable condition(s)/problem(s) that require work above and beyond the Medicare Wellness Visit     Chief Complaint  Medicare Wellness-subsequent    Subjective    HPI         The patient is a 64-year-old male who presents for a wellness check.    He reports elevated blood pressure, which he attributes to consuming several cups of coffee this morning without any food. He has increased his water intake over the past month. His cardiologist has reassured him that his heart is in good  condition.    He has been unable to secure an appointment with a neurologist or neurosurgeon regarding a previous fainting episode. Despite the cancellation of his initial appointment, he has not received a call to reschedule. He reports no further fainting episodes but expresses concern due to the unpredictability of these events. He recalls a specific incident where he fainted while preparing strawberries for his grandson's birthday, during a period of emotional distress following his brother's death. He had not eaten that day, was feeling hot, and had not consumed any water. He was prescribed Plavix following this fainting spell but has since run out. He also takes baby aspirin nightly.    He has been experiencing depression since undergoing open heart surgery and feels less happy than he should be. He is not currently taking any medication for his depression and reports no suicidal ideation. He wishes to wait and see if his mood improves before considering medication.    He has not had a colonoscopy in some time and plans to schedule one. He underwent LASIK surgery in 2013 and plans to have an eye exam. He has some hearing loss in his left ear, which he attributes to working with mechanical equipment. He occasionally experiences pain in his thumb joint but not in his feet. He sees a podiatrist regularly.    He recently saw the urologist who performed his vasectomy. He sees a dentist regularly and has seen a dermatologist in the past, but not recently as his skin is in good condition. He has never received an influenza or pneumonia vaccine and does not plan to start now. He has never received a shingles vaccine. He has a spot on the tip of his nose, which his dermatologist has confirmed is not cancerous.    He has lost some weight since his last visit, which he attributes to reducing his soda intake. He has undergone stent placement and triple bypass surgery. He had an occlusion in his right carotid artery, but  "it was not treated. He was put on medication due to concerns about a potential clot. He has sleep apnea but does not wear a mask at night.    SOCIAL HISTORY  He is .    FAMILY HISTORY  His father  at 62 and had heart attacks early in life and coronary disease. His mother had carotid blockage and arthritis. She  at 75.    IMMUNIZATIONS  He has received 2 COVID-19 vaccines.          Objective   Vital Signs:  BP (!) 176/108   Pulse 67   Resp 16   Ht 177.8 cm (70\")   Wt 133 kg (293 lb)   SpO2 95%   BMI 42.04 kg/m²   Physical Exam            Lab Results   Component Value Date    TRIG 121 2024    HDL 30 (L) 2024    LDL 92 2024    VLDL 22 2024    HGBA1C 7.15 (H) 2024            Assessment and Plan          1.  Medicare annual wellness visit-subsequent year  Upon arrival to the room the patient underwent the Medicare health risk assessment.  Neither the questions themselves or the answers that were given prompted any major concern on the part of the patient or by the medical staff that gave the assessment.  As far as the preventative care examinations and the preventative care immunizations that this patient requires they are as listed below.   Screening tests recommended:    Colonoscopy- utd  Diabetic eye exam- utd  Diabetic foot exam- utd  PSA-utd  Dentist- utd  Derm-utd  Card-utd  Immunization:  Influenza- does not want  Prevnar-does not want vaccine  Pneumovax-does not want  Tetanus- utd  Shingles vaccine-not wanting  Hepatitis -utd  Covid - utd  RSV   - not wanting               2. Coronary Artery Disease.  He has a history of coronary artery disease with a triple bypass. He is advised to continue following up with his cardiologist. His last echocardiogram on 2024 showed a left ventricular ejection fraction of 61% or better, mild enlargement of the right ventricular cavity, and mild dilatation of the left atrium and aortic root. He is scheduled to see his " cardiologist in September or October 2024. A stress test may be considered at the 5-year christina.    3. Hypertension.  His blood pressure was elevated today. He is advised to monitor his blood pressure at home. If it remains high, adjustments to his medications will be needed.    4. Mixed Hyperlipidemia.  He is on rosuvastatin, and his cholesterol panel looked good recently. He is advised to continue his current medication.    5. Morbid Obesity.  His BMI is 42. He is advised to follow a low-carb diet and continue his weight loss efforts. He has lost almost 5 pounds since his last visit, primarily by reducing soda intake.    6. Status Post Knee Arthroplasty on the Left.  He has had a total knee replacement on the left. He is doing well but still experiences some pain. Staying active and losing weight may help alleviate some of the pain.    7. Heat Syncope.  He experienced a fainting spell a few months ago, likely due to a combination of heat, dehydration, and emotional stress. He was evaluated in the emergency room, and no other etiology was found. He is advised to stay hydrated, eat regularly, and avoid situations that may trigger another episode. He should avoid climbing roofs or ladders until confident that the issue will not recur.    8. Obstructive Sleep Apnea.  He has sleep apnea but does not wear a mask due to discomfort. He could discuss new implanted devices with a sleep doctor, although they were not offered to him previously.    9. Depression.  He reports feeling less happy since his open-heart surgery and recent personal losses. He is not currently taking any medication for depression and prefers to wait and see if his mood improves. He is advised to contact the office if he decides to try medication for his mood.    10. Health Maintenance.  He is up to date with his colonoscopy as of 2017 but is advised to get another one before 2027. He had a tetanus shot in 2016, which is good until 2026. He has not had a  flu vaccine or pneumonia vaccine and is advised to consider these as he gets older. He is aware of the upcoming COVID-19 variant vaccine.      No orders of the defined types were placed in this encounter.            Follow Up   Return in about 1 year (around 8/14/2025), or if symptoms worsen or fail to improve, for Medicare Wellness.  Patient was given instructions and counseling regarding his condition or for health maintenance advice. Please see specific information pulled into the AVS if appropriate.  Patient or patient representative verbalized consent for the use of Ambient Listening during the visit with  Mike Redd MD for chart documentation. 8/14/2024  09:51 EDT

## 2024-09-04 ENCOUNTER — OFFICE VISIT (OUTPATIENT)
Dept: NEUROSURGERY | Facility: CLINIC | Age: 64
End: 2024-09-04
Payer: MEDICARE

## 2024-09-04 VITALS
HEIGHT: 70 IN | OXYGEN SATURATION: 95 % | TEMPERATURE: 97.8 F | WEIGHT: 295.1 LBS | BODY MASS INDEX: 42.25 KG/M2 | HEART RATE: 62 BPM | SYSTOLIC BLOOD PRESSURE: 148 MMHG | DIASTOLIC BLOOD PRESSURE: 86 MMHG

## 2024-09-04 DIAGNOSIS — I65.22 CAROTID STENOSIS, ASYMPTOMATIC, LEFT: ICD-10-CM

## 2024-09-04 DIAGNOSIS — I67.9 INTRACRANIAL VASCULAR STENOSIS: ICD-10-CM

## 2024-09-04 DIAGNOSIS — I65.21 CAROTID OCCLUSION, RIGHT: Primary | ICD-10-CM

## 2024-09-04 PROCEDURE — 99204 OFFICE O/P NEW MOD 45 MIN: CPT | Performed by: NEUROLOGICAL SURGERY

## 2024-09-04 PROCEDURE — 1160F RVW MEDS BY RX/DR IN RCRD: CPT | Performed by: NEUROLOGICAL SURGERY

## 2024-09-04 PROCEDURE — 3079F DIAST BP 80-89 MM HG: CPT | Performed by: NEUROLOGICAL SURGERY

## 2024-09-04 PROCEDURE — 3077F SYST BP >= 140 MM HG: CPT | Performed by: NEUROLOGICAL SURGERY

## 2024-09-04 PROCEDURE — 1159F MED LIST DOCD IN RCRD: CPT | Performed by: NEUROLOGICAL SURGERY

## 2024-09-04 NOTE — PROGRESS NOTES
Subjective   History of Present Illness: Flaco Yan is a 64 y.o. male is being seen for consultation today at the request of JA Jiménez for a right carotid artery occlusion. CTA carotids/head 6/23/24.  He had a CTA head and neck on June 23, 2024 after a syncopal event.  He was found to have a right carotid occlusion and moderate stenosis of the left carotid artery.  He denies any recent strokelike episodes.  Denies any changes of strength or sensation.  Denies any changes in speech.  Denies any changes in vision.  He reports that at the time of the syncopal event he was in his kitchen making chocolate covered strawberries and dealing with grief of the recent loss of his brother.    History of Present Illness    The following portions of the patient's history were reviewed and updated as appropriate: allergies, current medications, past family history, past medical history, past social history, past surgical history, and problem list.    Past Medical History:   Diagnosis Date    Coronary artery disease     GERD (gastroesophageal reflux disease)     Hyperlipidemia     Hypertension     Obesity     Presence of aortocoronary bypass graft 03/12/2015        Past Surgical History:   Procedure Laterality Date    CARDIAC CATHETERIZATION      CARDIAC SURGERY      CORONARY ARTERY BYPASS GRAFT      CORONARY STENT PLACEMENT      FINGER SURGERY      TOTAL KNEE ARTHROPLASTY Left 6/19/2020    Procedure: LEFT TOTAL KNEE;  Surgeon: Fernie Peoples II, MD;  Location: [facility];  Service: Orthopedics;  Laterality: Left;          Current Outpatient Medications:     aspirin 81 MG EC tablet, Take 1 tablet by mouth Every Night., Disp: , Rfl:     esomeprazole (nexIUM) 20 MG capsule, Take 1 capsule by mouth Every Morning Before Breakfast., Disp: , Rfl:     lisinopril (PRINIVIL,ZESTRIL) 10 MG tablet, Take 1 tablet by mouth Every Evening., Disp: 90 tablet, Rfl: 3    metFORMIN ER (GLUCOPHAGE-XR) 500 MG 24 hr tablet,  "Take 2 tablets by mouth Daily With Breakfast., Disp: 60 tablet, Rfl: 6    metoprolol tartrate (LOPRESSOR) 50 MG tablet, TAKE 1 TABLET BY MOUTH TWICE A DAY, Disp: 180 tablet, Rfl: 3    rosuvastatin (CRESTOR) 10 MG tablet, Take 1 tablet by mouth Every Night., Disp: 90 tablet, Rfl: 3    clopidogrel (PLAVIX) 75 MG tablet, Take 1 tablet by mouth Daily. (Patient not taking: Reported on 2024), Disp: 30 tablet, Rfl: 0     No Known Allergies     Social History     Socioeconomic History    Marital status: Single   Tobacco Use    Smoking status: Former     Current packs/day: 0.00     Average packs/day: 1 pack/day for 20.0 years (20.0 ttl pk-yrs)     Types: Cigarettes     Start date: 1979     Quit date: 1999     Years since quittin.7     Passive exposure: Past    Smokeless tobacco: Never   Vaping Use    Vaping status: Never Used   Substance and Sexual Activity    Alcohol use: No    Drug use: No    Sexual activity: Defer        Family History   Problem Relation Age of Onset    Stroke Mother     Heart disease Mother     Heart disease Father     Heart disease Sister         Review of Systems   Constitutional:  Negative for chills and fever.   Eyes:  Negative for visual disturbance.   Gastrointestinal:  Negative for nausea and vomiting.   Musculoskeletal:  Negative for neck pain and neck stiffness.   Neurological:  Negative for dizziness, syncope, speech difficulty, light-headedness, numbness and headaches.   All other systems reviewed and are negative.      Objective     Vitals:    24 1107   BP: 148/86   Pulse: 62   Temp: 97.8 °F (36.6 °C)   SpO2: 95%   Weight: 134 kg (295 lb 1.6 oz)   Height: 177.8 cm (70\")     Body mass index is 42.34 kg/m².      Physical Exam  Neurologic Exam  Awake, alert, oriented x3  Pupils equal round reactive to light  Extraocular muscles intact  Face symmetric  Speech is fluent and clear  No pronator drift  Motor exam  Bilateral deltoids 5/5, bilateral biceps 5/5, bilateral " triceps 5/5, bilateral wrist extension 5/5 bilateral hand  5/5  Bilateral hip flexion 5/5, bilateral knee extension 5/5, bilateral DF/PF 5/5  No clonus  No Jimmie's reflex  Steady normal gait  Able to detect  light touch in all 4 extremities          Assessment & Plan   Independent Review of Radiographic Studies:      I personally reviewed the images from the following studies.  MRI brain without contrast from 2024, CTA head and neck from 2024  The MRI images were reviewed and demonstrate no evidence of acute infarct.  The CTA images were reviewed demonstrate complete occlusion of the right cervical carotid artery at its origin.  There is 40 to 50% stenosis of the left cervical carotid artery.  There is severe stenosis of the right A1 segment.    Medical Decision Makin-year-old male with a symptomatic left carotid stenosis and an incidentally found right carotid occlusion  -Reviewed the MRI images and there is no evidence of acute infarct.  He is currently asymptomatic from his carotid occlusion and left-sided carotid stenosis.  I have recommended that he continue to take aspirin 81 mg daily as well as Crestor  -I will plan to see him back in 1 year with a repeat CTA head and neck.  If he develops any strokelike symptoms I have asked him to go to the emergency room immediately  Diagnoses and all orders for this visit:    1. Carotid occlusion, right (Primary)  -     CT Angiogram Head With Contrast; Future  -     CT angiogram carotids; Future    2. Carotid stenosis, asymptomatic, left  -     CT Angiogram Head With Contrast; Future  -     CT angiogram carotids; Future    3. Intracranial vascular stenosis  -     CT Angiogram Head With Contrast; Future  -     CT angiogram carotids; Future      Return in about 1 year (around 2025).    45 minutes reviewing the MRI images, reviewing the CTA images, discussing the management of carotid stenosis, discussing the signs and symptoms of stroke,  discussing stroke prevention, discussing the risks and benefits of carotid endarterectomy, discussing the risks and benefits of a carotid stent and angioplasty

## 2024-09-07 DIAGNOSIS — I10 PRIMARY HYPERTENSION: Primary | ICD-10-CM

## 2024-09-09 RX ORDER — AMLODIPINE BESYLATE 10 MG/1
TABLET ORAL
Qty: 90 TABLET | Refills: 0 | Status: SHIPPED | OUTPATIENT
Start: 2024-09-09 | End: 2024-09-16

## 2024-09-09 RX ORDER — ROSUVASTATIN CALCIUM 5 MG/1
TABLET, COATED ORAL
Qty: 90 TABLET | Refills: 2 | OUTPATIENT
Start: 2024-09-09

## 2024-09-16 ENCOUNTER — OFFICE VISIT (OUTPATIENT)
Dept: CARDIOLOGY | Facility: CLINIC | Age: 64
End: 2024-09-16
Payer: MEDICARE

## 2024-09-16 VITALS
OXYGEN SATURATION: 98 % | SYSTOLIC BLOOD PRESSURE: 167 MMHG | HEART RATE: 80 BPM | WEIGHT: 296 LBS | BODY MASS INDEX: 42.37 KG/M2 | DIASTOLIC BLOOD PRESSURE: 90 MMHG | HEIGHT: 70 IN

## 2024-09-16 DIAGNOSIS — E78.00 PURE HYPERCHOLESTEROLEMIA: ICD-10-CM

## 2024-09-16 DIAGNOSIS — I25.708 CORONARY ARTERY DISEASE OF BYPASS GRAFT OF NATIVE HEART WITH STABLE ANGINA PECTORIS: ICD-10-CM

## 2024-09-16 DIAGNOSIS — I10 ESSENTIAL HYPERTENSION: Primary | ICD-10-CM

## 2024-09-16 DIAGNOSIS — G47.33 OBSTRUCTIVE SLEEP APNEA: ICD-10-CM

## 2024-09-16 PROCEDURE — 1160F RVW MEDS BY RX/DR IN RCRD: CPT | Performed by: INTERNAL MEDICINE

## 2024-09-16 PROCEDURE — 1159F MED LIST DOCD IN RCRD: CPT | Performed by: INTERNAL MEDICINE

## 2024-09-16 PROCEDURE — 3077F SYST BP >= 140 MM HG: CPT | Performed by: INTERNAL MEDICINE

## 2024-09-16 PROCEDURE — 99214 OFFICE O/P EST MOD 30 MIN: CPT | Performed by: INTERNAL MEDICINE

## 2024-09-16 PROCEDURE — 3080F DIAST BP >= 90 MM HG: CPT | Performed by: INTERNAL MEDICINE

## 2024-09-16 RX ORDER — SENNOSIDES 8.6 MG
650 CAPSULE ORAL EVERY 8 HOURS PRN
COMMUNITY

## 2024-09-26 ENCOUNTER — EXTERNAL PBMM DATA (OUTPATIENT)
Dept: PHARMACY | Facility: OTHER | Age: 64
End: 2024-09-26
Payer: MEDICARE

## 2025-02-10 ENCOUNTER — LAB (OUTPATIENT)
Dept: FAMILY MEDICINE CLINIC | Facility: CLINIC | Age: 65
End: 2025-02-10
Payer: MEDICARE

## 2025-02-10 ENCOUNTER — TELEPHONE (OUTPATIENT)
Dept: BARIATRICS/WEIGHT MGMT | Facility: CLINIC | Age: 65
End: 2025-02-10
Payer: MEDICARE

## 2025-02-10 ENCOUNTER — OFFICE VISIT (OUTPATIENT)
Dept: FAMILY MEDICINE CLINIC | Facility: CLINIC | Age: 65
End: 2025-02-10
Payer: MEDICARE

## 2025-02-10 VITALS
DIASTOLIC BLOOD PRESSURE: 116 MMHG | RESPIRATION RATE: 16 BRPM | OXYGEN SATURATION: 98 % | HEART RATE: 82 BPM | SYSTOLIC BLOOD PRESSURE: 182 MMHG | WEIGHT: 303 LBS | HEIGHT: 70 IN | BODY MASS INDEX: 43.38 KG/M2

## 2025-02-10 DIAGNOSIS — I10 PRIMARY HYPERTENSION: ICD-10-CM

## 2025-02-10 DIAGNOSIS — E78.2 MODERATE MIXED HYPERLIPIDEMIA NOT REQUIRING STATIN THERAPY: ICD-10-CM

## 2025-02-10 DIAGNOSIS — E66.01 MORBID (SEVERE) OBESITY DUE TO EXCESS CALORIES: ICD-10-CM

## 2025-02-10 DIAGNOSIS — I25.708 CORONARY ARTERY DISEASE OF BYPASS GRAFT OF NATIVE HEART WITH STABLE ANGINA PECTORIS: Primary | ICD-10-CM

## 2025-02-10 DIAGNOSIS — R79.9 ABNORMAL BLOOD CHEMISTRY: ICD-10-CM

## 2025-02-10 LAB
ALBUMIN SERPL-MCNC: 4.1 G/DL (ref 3.5–5.2)
ALBUMIN/GLOB SERPL: 1.4 G/DL
ALP SERPL-CCNC: 68 U/L (ref 39–117)
ALT SERPL W P-5'-P-CCNC: 21 U/L (ref 1–41)
ANION GAP SERPL CALCULATED.3IONS-SCNC: 8.4 MMOL/L (ref 5–15)
AST SERPL-CCNC: 20 U/L (ref 1–40)
BILIRUB SERPL-MCNC: 0.4 MG/DL (ref 0–1.2)
BUN SERPL-MCNC: 14 MG/DL (ref 8–23)
BUN/CREAT SERPL: 12.8 (ref 7–25)
CALCIUM SPEC-SCNC: 9.5 MG/DL (ref 8.6–10.5)
CHLORIDE SERPL-SCNC: 106 MMOL/L (ref 98–107)
CHOLEST SERPL-MCNC: 163 MG/DL (ref 0–200)
CO2 SERPL-SCNC: 25.6 MMOL/L (ref 22–29)
CREAT SERPL-MCNC: 1.09 MG/DL (ref 0.76–1.27)
EGFRCR SERPLBLD CKD-EPI 2021: 76.3 ML/MIN/1.73
GLOBULIN UR ELPH-MCNC: 2.9 GM/DL
GLUCOSE SERPL-MCNC: 129 MG/DL (ref 65–99)
HBA1C MFR BLD: 6.8 % (ref 4.8–5.6)
HDLC SERPL-MCNC: 37 MG/DL (ref 40–60)
LDLC SERPL CALC-MCNC: 110 MG/DL (ref 0–100)
LDLC/HDLC SERPL: 2.96 {RATIO}
POTASSIUM SERPL-SCNC: 4.4 MMOL/L (ref 3.5–5.2)
PROT SERPL-MCNC: 7 G/DL (ref 6–8.5)
SODIUM SERPL-SCNC: 140 MMOL/L (ref 136–145)
TRIGL SERPL-MCNC: 82 MG/DL (ref 0–150)
VLDLC SERPL-MCNC: 16 MG/DL (ref 5–40)

## 2025-02-10 PROCEDURE — 3077F SYST BP >= 140 MM HG: CPT | Performed by: FAMILY MEDICINE

## 2025-02-10 PROCEDURE — 3080F DIAST BP >= 90 MM HG: CPT | Performed by: FAMILY MEDICINE

## 2025-02-10 PROCEDURE — 80061 LIPID PANEL: CPT | Performed by: FAMILY MEDICINE

## 2025-02-10 PROCEDURE — 1159F MED LIST DOCD IN RCRD: CPT | Performed by: FAMILY MEDICINE

## 2025-02-10 PROCEDURE — 99214 OFFICE O/P EST MOD 30 MIN: CPT | Performed by: FAMILY MEDICINE

## 2025-02-10 PROCEDURE — 83036 HEMOGLOBIN GLYCOSYLATED A1C: CPT | Performed by: FAMILY MEDICINE

## 2025-02-10 PROCEDURE — 80053 COMPREHEN METABOLIC PANEL: CPT | Performed by: FAMILY MEDICINE

## 2025-02-10 PROCEDURE — 1160F RVW MEDS BY RX/DR IN RCRD: CPT | Performed by: FAMILY MEDICINE

## 2025-02-10 PROCEDURE — 1126F AMNT PAIN NOTED NONE PRSNT: CPT | Performed by: FAMILY MEDICINE

## 2025-02-10 PROCEDURE — 36415 COLL VENOUS BLD VENIPUNCTURE: CPT

## 2025-02-10 RX ORDER — LISINOPRIL 40 MG/1
40 TABLET ORAL EVERY EVENING
Qty: 90 TABLET | Refills: 3 | Status: SHIPPED | OUTPATIENT
Start: 2025-02-10 | End: 2025-05-11

## 2025-02-10 RX ORDER — AMOXICILLIN 500 MG/1
1000 CAPSULE ORAL 3 TIMES DAILY
Qty: 30 CAPSULE | Refills: 1 | Status: SHIPPED | OUTPATIENT
Start: 2025-02-10

## 2025-02-10 NOTE — PROGRESS NOTES
Chief Complaint  Hyperlipidemia, Hypertension, and Obesity    Subjective        Flaco Yan presents to [facility]  History of Present Illness  The patient is a 65-year-old male who presents for evaluation of hypertension, morbid obesity, toothache, sleep apnea, right knee pain, and health maintenance.    He reports that his blood pressure readings at home are not as elevated as those observed during this visit. He attributes the current elevation to a recent episode of poor dietary choices and late-night television viewing. He has not calibrated his blood pressure cuff against the office equipment. He recalls an incident approximately 30 years ago when his blood pressure spiked, and he was prescribed lisinopril 5 mg, which resulted in a significant drop in his blood pressure to 60/40 or 60/50, rendering him unable to stand. He is compliant with his antihypertensive medications, having taken them prior to the appointment. His current medication regimen includes metoprolol, administered twice daily, and lisinopril, taken once daily at night.    He has a scheduled dental extraction either tomorrow or the following day due to a severe toothache. He is not currently on any antibiotics for this condition but has applied Anbesol for symptomatic relief.    He has a history of sleep apnea but does not utilize a sleep mask at night.    He has undergone total knee replacement surgery on his left knee. He reports experiencing pain in his right knee, which he describes as similar to the discomfort in his left knee. Despite reassurances from his orthopedist that his range of motion is satisfactory and radiographic evidence of a successful surgery, he continues to experience pain. He also reports pain on the lateral aspects of his knees, which he suspects may be muscular in origin, and describes a sensation of pain on the superior aspect of the bone.    He has previously attempted weight loss  "through the use of Ozempic but discontinued it due to persistent nausea. He is considering alternative weight loss strategies.    He had a colonoscopy 5 years ago and was told he did not need another one for a while. He is up to date with his colon studies. He reports no chest pain or cardiac symptoms. He is 10 years post-bypass surgery.    ALLERGIES  The patient has no known allergies.    MEDICATIONS  Current: Metoprolol, lisinopril, rosuvastatin, Anbesol.  Discontinued: Ozempic.       Objective   Vital Signs:  BP (!) 182/116   Pulse 82   Resp 16   Ht 177.8 cm (70\")   Wt (!) 137 kg (303 lb)   SpO2 98%   BMI 43.48 kg/m²   Estimated body mass index is 43.48 kg/m² as calculated from the following:    Height as of this encounter: 177.8 cm (70\").    Weight as of this encounter: 137 kg (303 lb).               Physical Exam  Constitutional:       Appearance: Normal appearance. He is well-developed. He is obese.   HENT:      Head: Normocephalic and atraumatic.      Right Ear: Tympanic membrane, ear canal and external ear normal.      Left Ear: Tympanic membrane, ear canal and external ear normal.      Nose: Nose normal.      Mouth/Throat:      Mouth: Mucous membranes are moist.      Pharynx: Oropharynx is clear. No oropharyngeal exudate.   Eyes:      Extraocular Movements: Extraocular movements intact.      Conjunctiva/sclera: Conjunctivae normal.      Pupils: Pupils are equal, round, and reactive to light.   Cardiovascular:      Rate and Rhythm: Normal rate and regular rhythm.      Pulses: Normal pulses.      Heart sounds: Normal heart sounds.   Pulmonary:      Effort: Pulmonary effort is normal.      Breath sounds: Normal breath sounds.   Abdominal:      General: Bowel sounds are normal.      Palpations: Abdomen is soft.   Musculoskeletal:         General: Normal range of motion.      Cervical back: Normal range of motion and neck supple.   Skin:     General: Skin is warm and dry.   Neurological:      General: No " focal deficit present.      Mental Status: He is alert and oriented to person, place, and time. Mental status is at baseline.   Psychiatric:         Mood and Affect: Mood normal.         Behavior: Behavior normal.         Thought Content: Thought content normal.         Judgment: Judgment normal.        Result Review :          Results                  Assessment & Plan  1. Coronary artery disease status post bypass 10 years ago.  He has not experienced any recent angina and reports feeling well in terms of his cardiac health. No modifications to his current medication regimen are necessary.    2. Mixed hyperlipidemia.  He is currently on rosuvastatin 10 mg. He will undergo laboratory tests today, and adjustments to his rosuvastatin dosage will be made based on the results.    3. Hypertension.  His blood pressure is significantly elevated today at 180/110. He is currently on metoprolol tartrate 50 mg twice daily and lisinopril 10 mg. The lisinopril dosage will be increased to 40 mg, with a gradual increase to 20 mg for a week, then 30 mg for a week, and finally to 40 mg if his blood pressure remains high. His response to these increased dosages will be monitored. He is advised to take extra lisinopril today until he gets the new prescription. He should ensure his blood pressure cuff is accurate by bringing it in for calibration at the next visit.    4. Morbid obesity.  His weight is 303 pounds, and he has a BMI of 43. He did not tolerate GLP-1 medications. Bariatric surgery is considered the best option for him. A referral to bariatric surgery will be made for further evaluation and management.    5. Toothache.  He reports severe pain from a tooth that is scheduled to be removed soon. He is advised to start amoxicillin immediately to prevent infection and help with pain management. He will take it for 5 days, including the day of the surgery and a couple of days after.    6. Sleep apnea.  He has sleep apnea but does  not currently use a sleep mask. No changes to his current management were discussed.    7. Right knee pain.  He reports significant pain in his right knee, similar to the pain in his left knee, despite previous orthopedic evaluations indicating everything is fine. Weight loss through bariatric surgery is recommended as it may alleviate some of the strain and pain in his knees.    8. Health maintenance.  He had a colonoscopy 5 years ago, which showed a small ulcer in the colon. He is advised to contact gastroenterology within the next couple of months to determine when his next colonoscopy is due.    Follow-up  The patient will follow up in 3 months.    PROCEDURE  The patient underwent total knee replacement surgery on the left knee.            Follow Up     Return in about 3 months (around 5/10/2025), or if symptoms worsen or fail to improve, for Recheck--come in for recheck on blood pressure.  I want him to document that his cuff is accurate.  Patient was given instructions and counseling regarding his condition or for health maintenance advice. Please see specific information pulled into the AVS if appropriate.     Patient or patient representative verbalized consent for the use of Ambient Listening during the visit with  Mike Redd MD for chart documentation. 2/10/2025  08:45 EST

## 2025-02-17 NOTE — TELEPHONE ENCOUNTER
Called and spoke with incoming referral. Unsure whether wants surgical or non-surgical. Will mail MWM packet and first appointment will need to be scheduled with Dr. Wren to discuss options

## 2025-02-28 ENCOUNTER — TELEPHONE (OUTPATIENT)
Dept: CARDIOLOGY | Facility: CLINIC | Age: 65
End: 2025-02-28
Payer: MEDICARE

## 2025-02-28 RX ORDER — METOPROLOL TARTRATE 50 MG
75 TABLET ORAL 3 TIMES DAILY
Start: 2025-02-28

## 2025-02-28 NOTE — TELEPHONE ENCOUNTER
Discussed with patient on phone    He will increase metoprolol tartrate to 75 mg TID.    He will monitor heart rate and call us if there is no improvement. he will bring home blood pressure readings as well as blood pressure monitor to visit on 3/17 with Dr. Fuller.  They will they will discuss further medication treatment and/or workup pending blood pressures

## 2025-02-28 NOTE — TELEPHONE ENCOUNTER
Pt's BP was 178/114 about 7:40am and as of 11:30am it is 203/131 and Pt is asking for a call back to see what he can do about it.

## 2025-02-28 NOTE — TELEPHONE ENCOUNTER
Called patient he stated he does have blood pressure readings as well.    I did ask about symptoms and patient denies chest pains, just feels very off.    185/100  78  158/97  82    197/123  73  195/122  80  182/108  87  160/102  79  174/103  73  178/124  84  187/119  86  189/106  76  177/123  84  214/122  94  199/111  79  184/115  78  170/114  83  203/131  92  Lisinopril has been increased from 10 mg to 40 mg daily    -metoprolol 50 mg BID    Office Visit with Mike Redd MD (02/10/2025)

## 2025-02-28 NOTE — TELEPHONE ENCOUNTER
Please call patient and see if he has any heart rate readings to see if we can increase his Lopressor  If not then it looks like we may need to add another blood pressure medication as his pressures are still too high despite his PCP going up on lisinopril to 40 mg    Please let me know and I will add additional medical therapy or increase metoprolol as able    He has upcoming visit with Dr. Fuller and they can discuss further cardiac workup for elevated pressure or further medication changes

## 2025-03-17 ENCOUNTER — OFFICE VISIT (OUTPATIENT)
Dept: CARDIOLOGY | Facility: CLINIC | Age: 65
End: 2025-03-17
Payer: MEDICARE

## 2025-03-17 VITALS
DIASTOLIC BLOOD PRESSURE: 83 MMHG | HEIGHT: 70 IN | OXYGEN SATURATION: 95 % | SYSTOLIC BLOOD PRESSURE: 178 MMHG | HEART RATE: 70 BPM | WEIGHT: 300 LBS | BODY MASS INDEX: 42.95 KG/M2

## 2025-03-17 DIAGNOSIS — E78.00 PURE HYPERCHOLESTEROLEMIA: ICD-10-CM

## 2025-03-17 DIAGNOSIS — I10 ESSENTIAL HYPERTENSION: Primary | ICD-10-CM

## 2025-03-17 DIAGNOSIS — G47.33 OBSTRUCTIVE SLEEP APNEA: ICD-10-CM

## 2025-03-17 DIAGNOSIS — I25.708 CORONARY ARTERY DISEASE OF BYPASS GRAFT OF NATIVE HEART WITH STABLE ANGINA PECTORIS: ICD-10-CM

## 2025-03-17 PROCEDURE — 1159F MED LIST DOCD IN RCRD: CPT | Performed by: INTERNAL MEDICINE

## 2025-03-17 PROCEDURE — 1160F RVW MEDS BY RX/DR IN RCRD: CPT | Performed by: INTERNAL MEDICINE

## 2025-03-17 PROCEDURE — 3079F DIAST BP 80-89 MM HG: CPT | Performed by: INTERNAL MEDICINE

## 2025-03-17 PROCEDURE — 99214 OFFICE O/P EST MOD 30 MIN: CPT | Performed by: INTERNAL MEDICINE

## 2025-03-17 PROCEDURE — 3077F SYST BP >= 140 MM HG: CPT | Performed by: INTERNAL MEDICINE

## 2025-03-17 RX ORDER — HYDRALAZINE HYDROCHLORIDE 50 MG/1
50 TABLET, FILM COATED ORAL 2 TIMES DAILY
Qty: 60 TABLET | Refills: 0 | Status: SHIPPED | OUTPATIENT
Start: 2025-03-17

## 2025-03-17 NOTE — PROGRESS NOTES
"    Subjective:     Encounter Date:03/17/2025      Patient ID: Flaco Yan is a 65 y.o. male.    Chief Complaint:  History of Present Illness 65-year-old white male with history of coronary disease status post coronary bypass surgery history of hypertension hyperlipidemia and sleep apnea presents to my office for a follow-up.  Patient is currently stable without any symptoms of chest pain or shortness of breath at rest or exertion.  No complaint of any PND orthopnea.  No palpitations dizziness syncope or swelling of the feet.  He is taking his medicines regularly.    The following portions of the patient's history were reviewed and updated as appropriate: allergies, current medications, past family history, past medical history, past social history, past surgical history, and problem list.  Past Medical History:   Diagnosis Date    Coronary artery disease     GERD (gastroesophageal reflux disease)     Hyperlipidemia     Hypertension     Obesity     Presence of aortocoronary bypass graft 03/12/2015     Past Surgical History:   Procedure Laterality Date    CARDIAC CATHETERIZATION      CARDIAC SURGERY      CORONARY ARTERY BYPASS GRAFT      CORONARY STENT PLACEMENT      FINGER SURGERY      TOTAL KNEE ARTHROPLASTY Left 6/19/2020    Procedure: LEFT TOTAL KNEE;  Surgeon: Fernie Peoples II, MD;  Location: [facility];  Service: Orthopedics;  Laterality: Left;     /83 Comment: right arm  Pulse 70   Ht 177.8 cm (70\")   Wt 136 kg (300 lb)   SpO2 95%   BMI 43.05 kg/m²   Family History   Problem Relation Age of Onset    Stroke Mother     Heart disease Mother     Heart disease Father     Heart disease Sister        Current Outpatient Medications:     aspirin 81 MG EC tablet, Take 1 tablet by mouth Every Night., Disp: , Rfl:     esomeprazole (nexIUM) 20 MG capsule, Take 1 capsule by mouth Every Morning Before Breakfast., Disp: , Rfl:     lisinopril (PRINIVIL,ZESTRIL) 40 MG tablet, Take 1 tablet by mouth " Every Evening for 90 days., Disp: 90 tablet, Rfl: 3    metoprolol tartrate (LOPRESSOR) 50 MG tablet, Take 1.5 tablets by mouth 3 (Three) Times a Day., Disp: 135 tablet, Rfl: 0    rosuvastatin (CRESTOR) 10 MG tablet, Take 1 tablet by mouth Every Night., Disp: 90 tablet, Rfl: 3    amoxicillin (AMOXIL) 500 MG capsule, Take 2 capsules by mouth 3 (Three) Times a Day. (Patient not taking: Reported on 3/17/2025), Disp: 30 capsule, Rfl: 1  No Known Allergies  Social History     Socioeconomic History    Marital status: Single   Tobacco Use    Smoking status: Former     Current packs/day: 0.00     Average packs/day: 1 pack/day for 20.0 years (20.0 ttl pk-yrs)     Types: Cigarettes     Start date: 1979     Quit date: 1999     Years since quittin.2     Passive exposure: Past    Smokeless tobacco: Never   Vaping Use    Vaping status: Never Used   Substance and Sexual Activity    Alcohol use: No    Drug use: No    Sexual activity: Defer     Review of Systems   Constitutional: Negative for malaise/fatigue.   Cardiovascular:  Negative for chest pain, dyspnea on exertion, leg swelling and palpitations.   Respiratory:  Negative for cough and shortness of breath.    Gastrointestinal:  Negative for abdominal pain, nausea and vomiting.   Neurological:  Negative for dizziness, focal weakness, headaches, light-headedness and numbness.   All other systems reviewed and are negative.             Objective:     Constitutional:       Appearance: Well-developed.   Eyes:      General: No scleral icterus.     Conjunctiva/sclera: Conjunctivae normal.   HENT:      Head: Normocephalic and atraumatic.   Neck:      Vascular: No carotid bruit or JVD.   Pulmonary:      Effort: Pulmonary effort is normal.      Breath sounds: Normal breath sounds. No wheezing. No rales.   Cardiovascular:      Normal rate. Regular rhythm.   Pulses:     Intact distal pulses.   Abdominal:      General: Bowel sounds are normal.      Palpations: Abdomen is  soft.   Musculoskeletal:      Cervical back: Normal range of motion and neck supple. Skin:     General: Skin is warm and dry.      Findings: No rash.   Neurological:      Mental Status: Alert.       Procedures    Lab Review:         MDM    #1 coronary disease  Patient had a coronary bypass surgery x 3 vessels with a LIMA to LAD and SVG to the marginal branch and RCA and is currently stable without any angina and has normal V function  2.  Hypertension  Patient blood pressure currently stable on metoprolol and lisinopril  3.  Hyperlipidemia  Patient has been on statins and the lipid levels are well within normal limits.  4.  Sleep apnea  Patient had sleep apnea and uses a CPAP machine.    Patient's previous medical records, labs, and EKG were reviewed and discussed with the patient at today's visit.

## 2025-03-18 RX ORDER — METOPROLOL TARTRATE 50 MG
50 TABLET ORAL 2 TIMES DAILY
Qty: 180 TABLET | Refills: 1 | Status: SHIPPED | OUTPATIENT
Start: 2025-03-18

## 2025-04-07 RX ORDER — METOPROLOL TARTRATE 50 MG
TABLET ORAL
Qty: 135 TABLET | OUTPATIENT
Start: 2025-04-07

## 2025-04-07 NOTE — TELEPHONE ENCOUNTER
Rx Refill Note  Requested Prescriptions     Refused Prescriptions Disp Refills    metoprolol tartrate (LOPRESSOR) 50 MG tablet [Pharmacy Med Name: METOPROLOL TARTRATE 50 MG TAB] 135 tablet      Sig: TAKE 1.5 TABLETS BY MOUTH THREE TIMES A DAY      Last office visit with prescribing clinician: 3/17/2025   Last telemedicine visit with prescribing clinician: Visit date not found   Next office visit with prescribing clinician: 9/17/2025                         Would you like a call back once the refill request has been completed: [] Yes [] No    If the office needs to give you a call back, can they leave a voicemail: [] Yes [] No    Farhan Noble MA  04/07/25, 08:37 EDT

## 2025-04-14 RX ORDER — HYDRALAZINE HYDROCHLORIDE 50 MG/1
50 TABLET, FILM COATED ORAL 2 TIMES DAILY
Qty: 60 TABLET | Refills: 0 | Status: SHIPPED | OUTPATIENT
Start: 2025-04-14

## 2025-04-30 ENCOUNTER — HOSPITAL ENCOUNTER (OUTPATIENT)
Facility: HOSPITAL | Age: 65
Setting detail: OBSERVATION
Discharge: HOME | End: 2025-05-01
Attending: EMERGENCY MEDICINE | Admitting: EMERGENCY MEDICINE
Payer: MEDICARE

## 2025-04-30 ENCOUNTER — APPOINTMENT (OUTPATIENT)
Dept: CT IMAGING | Facility: HOSPITAL | Age: 65
End: 2025-04-30
Payer: MEDICARE

## 2025-04-30 DIAGNOSIS — R10.84 GENERALIZED ABDOMINAL PAIN: Primary | ICD-10-CM

## 2025-04-30 DIAGNOSIS — N28.9 MILD RENAL INSUFFICIENCY: ICD-10-CM

## 2025-04-30 LAB
ALBUMIN SERPL-MCNC: 4.6 G/DL (ref 3.5–5.2)
ALBUMIN/GLOB SERPL: 1.8 G/DL
ALP SERPL-CCNC: 67 U/L (ref 39–117)
ALT SERPL W P-5'-P-CCNC: 28 U/L (ref 1–41)
ANION GAP SERPL CALCULATED.3IONS-SCNC: 11.9 MMOL/L (ref 5–15)
AST SERPL-CCNC: 30 U/L (ref 1–40)
BASOPHILS # BLD AUTO: 0.05 10*3/MM3 (ref 0–0.2)
BASOPHILS NFR BLD AUTO: 0.6 % (ref 0–1.5)
BILIRUB SERPL-MCNC: 0.3 MG/DL (ref 0–1.2)
BILIRUB UR QL STRIP: NEGATIVE
BUN SERPL-MCNC: 42 MG/DL (ref 8–23)
BUN/CREAT SERPL: 28.4 (ref 7–25)
CALCIUM SPEC-SCNC: 9.7 MG/DL (ref 8.6–10.5)
CHLORIDE SERPL-SCNC: 106 MMOL/L (ref 98–107)
CLARITY UR: CLEAR
CO2 SERPL-SCNC: 20.1 MMOL/L (ref 22–29)
COLOR UR: YELLOW
CREAT SERPL-MCNC: 1.48 MG/DL (ref 0.76–1.27)
DEPRECATED RDW RBC AUTO: 40.5 FL (ref 37–54)
EGFRCR SERPLBLD CKD-EPI 2021: 52.8 ML/MIN/1.73
EOSINOPHIL # BLD AUTO: 0.23 10*3/MM3 (ref 0–0.4)
EOSINOPHIL NFR BLD AUTO: 2.7 % (ref 0.3–6.2)
ERYTHROCYTE [DISTWIDTH] IN BLOOD BY AUTOMATED COUNT: 12.1 % (ref 12.3–15.4)
GLOBULIN UR ELPH-MCNC: 2.5 GM/DL
GLUCOSE SERPL-MCNC: 163 MG/DL (ref 65–99)
GLUCOSE UR STRIP-MCNC: NEGATIVE MG/DL
HCT VFR BLD AUTO: 41.9 % (ref 37.5–51)
HGB BLD-MCNC: 13.7 G/DL (ref 13–17.7)
HGB UR QL STRIP.AUTO: NEGATIVE
IMM GRANULOCYTES # BLD AUTO: 0.01 10*3/MM3 (ref 0–0.05)
IMM GRANULOCYTES NFR BLD AUTO: 0.1 % (ref 0–0.5)
KETONES UR QL STRIP: ABNORMAL
LEUKOCYTE ESTERASE UR QL STRIP.AUTO: NEGATIVE
LIPASE SERPL-CCNC: 27 U/L (ref 13–60)
LYMPHOCYTES # BLD AUTO: 2.2 10*3/MM3 (ref 0.7–3.1)
LYMPHOCYTES NFR BLD AUTO: 26.2 % (ref 19.6–45.3)
MCH RBC QN AUTO: 29.7 PG (ref 26.6–33)
MCHC RBC AUTO-ENTMCNC: 32.7 G/DL (ref 31.5–35.7)
MCV RBC AUTO: 90.7 FL (ref 79–97)
MONOCYTES # BLD AUTO: 0.64 10*3/MM3 (ref 0.1–0.9)
MONOCYTES NFR BLD AUTO: 7.6 % (ref 5–12)
NEUTROPHILS NFR BLD AUTO: 5.26 10*3/MM3 (ref 1.7–7)
NEUTROPHILS NFR BLD AUTO: 62.8 % (ref 42.7–76)
NITRITE UR QL STRIP: NEGATIVE
NRBC BLD AUTO-RTO: 0 /100 WBC (ref 0–0.2)
PH UR STRIP.AUTO: <=5 [PH] (ref 5–8)
PLATELET # BLD AUTO: 315 10*3/MM3 (ref 140–450)
PMV BLD AUTO: 11 FL (ref 6–12)
POTASSIUM SERPL-SCNC: 4.2 MMOL/L (ref 3.5–5.2)
PROT SERPL-MCNC: 7.1 G/DL (ref 6–8.5)
PROT UR QL STRIP: NEGATIVE
RBC # BLD AUTO: 4.62 10*6/MM3 (ref 4.14–5.8)
SODIUM SERPL-SCNC: 138 MMOL/L (ref 136–145)
SP GR UR STRIP: 1.02 (ref 1–1.03)
UROBILINOGEN UR QL STRIP: ABNORMAL
WBC NRBC COR # BLD AUTO: 8.39 10*3/MM3 (ref 3.4–10.8)

## 2025-04-30 PROCEDURE — 96375 TX/PRO/DX INJ NEW DRUG ADDON: CPT

## 2025-04-30 PROCEDURE — 74177 CT ABD & PELVIS W/CONTRAST: CPT

## 2025-04-30 PROCEDURE — 25010000002 HYDROMORPHONE 1 MG/ML SOLUTION: Performed by: NURSE PRACTITIONER

## 2025-04-30 PROCEDURE — 25810000003 SODIUM CHLORIDE 0.9 % SOLUTION: Performed by: NURSE PRACTITIONER

## 2025-04-30 PROCEDURE — 83690 ASSAY OF LIPASE: CPT | Performed by: NURSE PRACTITIONER

## 2025-04-30 PROCEDURE — 25010000002 ONDANSETRON PER 1 MG: Performed by: NURSE PRACTITIONER

## 2025-04-30 PROCEDURE — 81003 URINALYSIS AUTO W/O SCOPE: CPT | Performed by: NURSE PRACTITIONER

## 2025-04-30 PROCEDURE — 80053 COMPREHEN METABOLIC PANEL: CPT | Performed by: NURSE PRACTITIONER

## 2025-04-30 PROCEDURE — G0378 HOSPITAL OBSERVATION PER HR: HCPCS

## 2025-04-30 PROCEDURE — 99285 EMERGENCY DEPT VISIT HI MDM: CPT

## 2025-04-30 PROCEDURE — 36415 COLL VENOUS BLD VENIPUNCTURE: CPT

## 2025-04-30 PROCEDURE — 96374 THER/PROPH/DIAG INJ IV PUSH: CPT

## 2025-04-30 PROCEDURE — 85025 COMPLETE CBC W/AUTO DIFF WBC: CPT | Performed by: NURSE PRACTITIONER

## 2025-04-30 PROCEDURE — 87040 BLOOD CULTURE FOR BACTERIA: CPT | Performed by: NURSE PRACTITIONER

## 2025-04-30 PROCEDURE — 96361 HYDRATE IV INFUSION ADD-ON: CPT

## 2025-04-30 PROCEDURE — 25510000001 IOPAMIDOL PER 1 ML: Performed by: NURSE PRACTITIONER

## 2025-04-30 RX ORDER — BISACODYL 5 MG/1
5 TABLET, DELAYED RELEASE ORAL DAILY PRN
Status: DISCONTINUED | OUTPATIENT
Start: 2025-04-30 | End: 2025-05-01 | Stop reason: HOSPADM

## 2025-04-30 RX ORDER — ENOXAPARIN SODIUM 100 MG/ML
40 INJECTION SUBCUTANEOUS EVERY 12 HOURS SCHEDULED
Status: DISCONTINUED | OUTPATIENT
Start: 2025-04-30 | End: 2025-05-01 | Stop reason: HOSPADM

## 2025-04-30 RX ORDER — IOPAMIDOL 755 MG/ML
100 INJECTION, SOLUTION INTRAVASCULAR
Status: COMPLETED | OUTPATIENT
Start: 2025-04-30 | End: 2025-04-30

## 2025-04-30 RX ORDER — ACETAMINOPHEN 650 MG/1
650 SUPPOSITORY RECTAL EVERY 4 HOURS PRN
Status: DISCONTINUED | OUTPATIENT
Start: 2025-04-30 | End: 2025-05-01 | Stop reason: HOSPADM

## 2025-04-30 RX ORDER — HYDROCODONE BITARTRATE AND ACETAMINOPHEN 5; 325 MG/1; MG/1
1 TABLET ORAL EVERY 6 HOURS PRN
Refills: 0 | Status: DISCONTINUED | OUTPATIENT
Start: 2025-04-30 | End: 2025-05-01 | Stop reason: HOSPADM

## 2025-04-30 RX ORDER — SODIUM CHLORIDE 0.9 % (FLUSH) 0.9 %
10 SYRINGE (ML) INJECTION AS NEEDED
Status: DISCONTINUED | OUTPATIENT
Start: 2025-04-30 | End: 2025-05-01 | Stop reason: HOSPADM

## 2025-04-30 RX ORDER — POLYETHYLENE GLYCOL 3350 17 G/17G
17 POWDER, FOR SOLUTION ORAL DAILY PRN
Status: DISCONTINUED | OUTPATIENT
Start: 2025-04-30 | End: 2025-05-01 | Stop reason: HOSPADM

## 2025-04-30 RX ORDER — SODIUM CHLORIDE 9 MG/ML
100 INJECTION, SOLUTION INTRAVENOUS CONTINUOUS
Status: DISCONTINUED | OUTPATIENT
Start: 2025-04-30 | End: 2025-05-01 | Stop reason: HOSPADM

## 2025-04-30 RX ORDER — ACETAMINOPHEN 325 MG/1
650 TABLET ORAL EVERY 4 HOURS PRN
Status: DISCONTINUED | OUTPATIENT
Start: 2025-04-30 | End: 2025-05-01 | Stop reason: HOSPADM

## 2025-04-30 RX ORDER — METOPROLOL TARTRATE 50 MG
50 TABLET ORAL ONCE AS NEEDED
Status: DISCONTINUED | OUTPATIENT
Start: 2025-04-30 | End: 2025-05-01

## 2025-04-30 RX ORDER — ACETAMINOPHEN 160 MG/5ML
650 SOLUTION ORAL EVERY 4 HOURS PRN
Status: DISCONTINUED | OUTPATIENT
Start: 2025-04-30 | End: 2025-05-01 | Stop reason: HOSPADM

## 2025-04-30 RX ORDER — BISACODYL 10 MG
10 SUPPOSITORY, RECTAL RECTAL DAILY PRN
Status: DISCONTINUED | OUTPATIENT
Start: 2025-04-30 | End: 2025-05-01 | Stop reason: HOSPADM

## 2025-04-30 RX ORDER — HYDROCHLOROTHIAZIDE 25 MG/1
25 TABLET ORAL DAILY
COMMUNITY
End: 2025-05-01 | Stop reason: HOSPADM

## 2025-04-30 RX ORDER — ONDANSETRON 2 MG/ML
4 INJECTION INTRAMUSCULAR; INTRAVENOUS ONCE
Status: COMPLETED | OUTPATIENT
Start: 2025-04-30 | End: 2025-04-30

## 2025-04-30 RX ORDER — ONDANSETRON 2 MG/ML
4 INJECTION INTRAMUSCULAR; INTRAVENOUS EVERY 6 HOURS PRN
Status: DISCONTINUED | OUTPATIENT
Start: 2025-04-30 | End: 2025-05-01 | Stop reason: HOSPADM

## 2025-04-30 RX ORDER — DICYCLOMINE HYDROCHLORIDE 10 MG/1
40 CAPSULE ORAL ONCE
Status: COMPLETED | OUTPATIENT
Start: 2025-04-30 | End: 2025-04-30

## 2025-04-30 RX ORDER — ROSUVASTATIN CALCIUM 10 MG/1
10 TABLET, COATED ORAL ONCE
Status: COMPLETED | OUTPATIENT
Start: 2025-05-01 | End: 2025-05-01

## 2025-04-30 RX ORDER — SODIUM CHLORIDE 9 MG/ML
40 INJECTION, SOLUTION INTRAVENOUS AS NEEDED
Status: DISCONTINUED | OUTPATIENT
Start: 2025-04-30 | End: 2025-05-01 | Stop reason: HOSPADM

## 2025-04-30 RX ORDER — AMOXICILLIN 250 MG
2 CAPSULE ORAL 2 TIMES DAILY PRN
Status: DISCONTINUED | OUTPATIENT
Start: 2025-04-30 | End: 2025-05-01 | Stop reason: HOSPADM

## 2025-04-30 RX ORDER — SODIUM CHLORIDE 0.9 % (FLUSH) 0.9 %
10 SYRINGE (ML) INJECTION EVERY 12 HOURS SCHEDULED
Status: DISCONTINUED | OUTPATIENT
Start: 2025-04-30 | End: 2025-05-01 | Stop reason: HOSPADM

## 2025-04-30 RX ADMIN — IOPAMIDOL 100 ML: 755 INJECTION, SOLUTION INTRAVENOUS at 21:02

## 2025-04-30 RX ADMIN — ONDANSETRON 4 MG: 2 INJECTION, SOLUTION INTRAMUSCULAR; INTRAVENOUS at 21:23

## 2025-04-30 RX ADMIN — SODIUM CHLORIDE 100 ML/HR: 9 INJECTION, SOLUTION INTRAVENOUS at 21:37

## 2025-04-30 RX ADMIN — DICYCLOMINE HYDROCHLORIDE 40 MG: 10 CAPSULE ORAL at 21:37

## 2025-04-30 RX ADMIN — SODIUM CHLORIDE 500 ML: 9 INJECTION, SOLUTION INTRAVENOUS at 21:26

## 2025-04-30 RX ADMIN — HYDROMORPHONE HYDROCHLORIDE 0.5 MG: 1 INJECTION, SOLUTION INTRAMUSCULAR; INTRAVENOUS; SUBCUTANEOUS at 21:23

## 2025-05-01 ENCOUNTER — APPOINTMENT (OUTPATIENT)
Dept: ULTRASOUND IMAGING | Facility: HOSPITAL | Age: 65
End: 2025-05-01
Payer: MEDICARE

## 2025-05-01 ENCOUNTER — READMISSION MANAGEMENT (OUTPATIENT)
Dept: CALL CENTER | Facility: HOSPITAL | Age: 65
End: 2025-05-01
Payer: MEDICARE

## 2025-05-01 VITALS
DIASTOLIC BLOOD PRESSURE: 77 MMHG | SYSTOLIC BLOOD PRESSURE: 134 MMHG | TEMPERATURE: 98 F | WEIGHT: 287.04 LBS | OXYGEN SATURATION: 97 % | HEIGHT: 70 IN | HEART RATE: 63 BPM | RESPIRATION RATE: 18 BRPM | BODY MASS INDEX: 41.09 KG/M2

## 2025-05-01 LAB
ANION GAP SERPL CALCULATED.3IONS-SCNC: 11.3 MMOL/L (ref 5–15)
ANION GAP SERPL CALCULATED.3IONS-SCNC: 9.1 MMOL/L (ref 5–15)
BASOPHILS # BLD AUTO: 0.03 10*3/MM3 (ref 0–0.2)
BASOPHILS NFR BLD AUTO: 0.4 % (ref 0–1.5)
BUN SERPL-MCNC: 25 MG/DL (ref 8–23)
BUN SERPL-MCNC: 35 MG/DL (ref 8–23)
BUN/CREAT SERPL: 25.3 (ref 7–25)
BUN/CREAT SERPL: 26.5 (ref 7–25)
CALCIUM SPEC-SCNC: 8.7 MG/DL (ref 8.6–10.5)
CALCIUM SPEC-SCNC: 9.2 MG/DL (ref 8.6–10.5)
CHLORIDE SERPL-SCNC: 105 MMOL/L (ref 98–107)
CHLORIDE SERPL-SCNC: 107 MMOL/L (ref 98–107)
CO2 SERPL-SCNC: 20.7 MMOL/L (ref 22–29)
CO2 SERPL-SCNC: 22.9 MMOL/L (ref 22–29)
CREAT SERPL-MCNC: 0.99 MG/DL (ref 0.76–1.27)
CREAT SERPL-MCNC: 1.32 MG/DL (ref 0.76–1.27)
DEPRECATED RDW RBC AUTO: 41.6 FL (ref 37–54)
EGFRCR SERPLBLD CKD-EPI 2021: 60.6 ML/MIN/1.73
EGFRCR SERPLBLD CKD-EPI 2021: 85.6 ML/MIN/1.73
EOSINOPHIL # BLD AUTO: 0.27 10*3/MM3 (ref 0–0.4)
EOSINOPHIL NFR BLD AUTO: 3.6 % (ref 0.3–6.2)
ERYTHROCYTE [DISTWIDTH] IN BLOOD BY AUTOMATED COUNT: 12.2 % (ref 12.3–15.4)
GLUCOSE SERPL-MCNC: 121 MG/DL (ref 65–99)
GLUCOSE SERPL-MCNC: 198 MG/DL (ref 65–99)
HCT VFR BLD AUTO: 40.2 % (ref 37.5–51)
HGB BLD-MCNC: 13.1 G/DL (ref 13–17.7)
IMM GRANULOCYTES # BLD AUTO: 0.01 10*3/MM3 (ref 0–0.05)
IMM GRANULOCYTES NFR BLD AUTO: 0.1 % (ref 0–0.5)
LYMPHOCYTES # BLD AUTO: 3.17 10*3/MM3 (ref 0.7–3.1)
LYMPHOCYTES NFR BLD AUTO: 42.7 % (ref 19.6–45.3)
MCH RBC QN AUTO: 30 PG (ref 26.6–33)
MCHC RBC AUTO-ENTMCNC: 32.6 G/DL (ref 31.5–35.7)
MCV RBC AUTO: 92.2 FL (ref 79–97)
MONOCYTES # BLD AUTO: 0.51 10*3/MM3 (ref 0.1–0.9)
MONOCYTES NFR BLD AUTO: 6.9 % (ref 5–12)
NEUTROPHILS NFR BLD AUTO: 3.44 10*3/MM3 (ref 1.7–7)
NEUTROPHILS NFR BLD AUTO: 46.3 % (ref 42.7–76)
NRBC BLD AUTO-RTO: 0 /100 WBC (ref 0–0.2)
PLATELET # BLD AUTO: 254 10*3/MM3 (ref 140–450)
PMV BLD AUTO: 11 FL (ref 6–12)
POTASSIUM SERPL-SCNC: 3.9 MMOL/L (ref 3.5–5.2)
POTASSIUM SERPL-SCNC: 4.3 MMOL/L (ref 3.5–5.2)
RBC # BLD AUTO: 4.36 10*6/MM3 (ref 4.14–5.8)
SODIUM SERPL-SCNC: 137 MMOL/L (ref 136–145)
SODIUM SERPL-SCNC: 139 MMOL/L (ref 136–145)
WBC NRBC COR # BLD AUTO: 7.43 10*3/MM3 (ref 3.4–10.8)

## 2025-05-01 PROCEDURE — 85025 COMPLETE CBC W/AUTO DIFF WBC: CPT | Performed by: NURSE PRACTITIONER

## 2025-05-01 PROCEDURE — G0378 HOSPITAL OBSERVATION PER HR: HCPCS

## 2025-05-01 PROCEDURE — 25010000002 ENOXAPARIN PER 10 MG: Performed by: NURSE PRACTITIONER

## 2025-05-01 PROCEDURE — 80048 BASIC METABOLIC PNL TOTAL CA: CPT | Performed by: NURSE PRACTITIONER

## 2025-05-01 PROCEDURE — 80048 BASIC METABOLIC PNL TOTAL CA: CPT | Performed by: PHYSICIAN ASSISTANT

## 2025-05-01 PROCEDURE — 76775 US EXAM ABDO BACK WALL LIM: CPT

## 2025-05-01 PROCEDURE — 96372 THER/PROPH/DIAG INJ SC/IM: CPT

## 2025-05-01 RX ORDER — CLONIDINE HYDROCHLORIDE 0.1 MG/1
0.1 TABLET ORAL EVERY 12 HOURS SCHEDULED
Status: DISCONTINUED | OUTPATIENT
Start: 2025-05-01 | End: 2025-05-01 | Stop reason: HOSPADM

## 2025-05-01 RX ORDER — ROSUVASTATIN CALCIUM 10 MG/1
10 TABLET, COATED ORAL NIGHTLY
Status: DISCONTINUED | OUTPATIENT
Start: 2025-05-02 | End: 2025-05-01 | Stop reason: HOSPADM

## 2025-05-01 RX ORDER — PANTOPRAZOLE SODIUM 40 MG/1
40 TABLET, DELAYED RELEASE ORAL
Status: DISCONTINUED | OUTPATIENT
Start: 2025-05-01 | End: 2025-05-01 | Stop reason: HOSPADM

## 2025-05-01 RX ORDER — METOPROLOL TARTRATE 50 MG
50 TABLET ORAL 2 TIMES DAILY
Status: DISCONTINUED | OUTPATIENT
Start: 2025-05-01 | End: 2025-05-01 | Stop reason: HOSPADM

## 2025-05-01 RX ORDER — HYDRALAZINE HYDROCHLORIDE 25 MG/1
50 TABLET, FILM COATED ORAL 2 TIMES DAILY
Status: DISCONTINUED | OUTPATIENT
Start: 2025-05-01 | End: 2025-05-01 | Stop reason: HOSPADM

## 2025-05-01 RX ORDER — HYDROCHLOROTHIAZIDE 25 MG/1
25 TABLET ORAL DAILY
Status: DISCONTINUED | OUTPATIENT
Start: 2025-05-01 | End: 2025-05-01 | Stop reason: HOSPADM

## 2025-05-01 RX ORDER — CLONIDINE HYDROCHLORIDE 0.1 MG/1
0.1 TABLET ORAL EVERY 12 HOURS SCHEDULED
Qty: 60 TABLET | Refills: 1 | Status: SHIPPED | OUTPATIENT
Start: 2025-05-01

## 2025-05-01 RX ORDER — ASPIRIN 81 MG/1
81 TABLET ORAL NIGHTLY
Status: DISCONTINUED | OUTPATIENT
Start: 2025-05-01 | End: 2025-05-01 | Stop reason: HOSPADM

## 2025-05-01 RX ORDER — LISINOPRIL 20 MG/1
40 TABLET ORAL EVERY EVENING
Status: DISCONTINUED | OUTPATIENT
Start: 2025-05-01 | End: 2025-05-01 | Stop reason: HOSPADM

## 2025-05-01 RX ADMIN — HYDRALAZINE HYDROCHLORIDE 50 MG: 25 TABLET ORAL at 09:15

## 2025-05-01 RX ADMIN — ROSUVASTATIN CALCIUM 10 MG: 10 TABLET, FILM COATED ORAL at 00:13

## 2025-05-01 RX ADMIN — PANTOPRAZOLE SODIUM 40 MG: 40 TABLET, DELAYED RELEASE ORAL at 09:15

## 2025-05-01 RX ADMIN — ENOXAPARIN SODIUM 40 MG: 100 INJECTION SUBCUTANEOUS at 00:13

## 2025-05-01 RX ADMIN — Medication 10 ML: at 08:00

## 2025-05-01 RX ADMIN — METOPROLOL TARTRATE 50 MG: 50 TABLET, FILM COATED ORAL at 09:15

## 2025-05-01 NOTE — OUTREACH NOTE
Prep Survey      Flowsheet Row Responses   [facility] patient discharged from? [place]   Is LACE score < 7 ? Yes   Eligibility [facility]   Date of Admission 04/30/25   Date of Discharge 05/01/25   Discharge Disposition Home or Self Care   Discharge diagnosis Renal insufficiency   Does the patient have one of the following disease processes/diagnoses(primary or secondary)? Other   Does the patient have Home health ordered? No   Is there a DME ordered? No   Prep survey completed? Yes            Lelia STEPHEN - Registered Nurse

## 2025-05-01 NOTE — CASE MANAGEMENT/SOCIAL WORK
Discharge Planning Assessment   Jeremias     Patient Name: Flaco Yan  MRN: [id number]  Today's Date: 5/1/2025    Admit Date: 4/30/2025    Plan: Routine home.   Discharge Needs Assessment       Row Name 05/01/25 1105       Living Environment    People in Home alone    Current Living Arrangements home    Potentially Unsafe Housing Conditions none    In the past 12 months has the electric, gas, oil, or water company threatened to shut off services in your home? No    Primary Care Provided by self    Provides Primary Care For no one    Quality of Family Relationships unable to assess    Able to Return to Prior Arrangements yes       Resource/Environmental Concerns    Resource/Environmental Concerns none    Transportation Concerns none       Transportation Needs    In the past 12 months, has lack of transportation kept you from medical appointments or from getting medications? no    In the past 12 months, has lack of transportation kept you from meetings, work, or from getting things needed for daily living? No       Food Insecurity    Within the past 12 months, you worried that your food would run out before you got the money to buy more. Never true    Within the past 12 months, the food you bought just didn't last and you didn't have money to get more. Never true       Transition Planning    Patient/Family Anticipates Transition to home    Patient/Family Anticipated Services at Transition none    Transportation Anticipated car, drives self       Discharge Needs Assessment    Readmission Within the Last 30 Days no previous admission in last 30 days    Equipment Currently Used at Home none    Concerns to be Addressed denies needs/concerns at this time;no discharge needs identified    Do you want help finding or keeping work or a job? Patient declined    Do you want help with school or training? For example, starting or completing job training or getting a high school diploma, GED or equivalent No    Anticipated  Changes Related to Illness none    Equipment Needed After Discharge none    Provided Post Acute Provider List? N/A    Provided Post Acute Provider Quality & Resource List? N/A                   Discharge Plan       Row Name 05/01/25 1105       Plan    Plan Routine home.    Patient/Family in Agreement with Plan yes    Plan Comments CM met with patient at bedside. Pt lives at home alone, drives, and is independent with ADL's. PCP and pharmacy confirmed - agreeable to use [facility]. Pt denies DME use at home and no current HH/therapy services. Pt drove himself to the hospital and plans to drive at dc.              Demographic Summary       Row Name 05/01/25 1105       General Information    Admission Type observation    Arrived From emergency department    Referral Source admission list    Reason for Consult care coordination/care conference;discharge planning    Preferred Language English       Contact Information    Permission Granted to Share Info With                    Functional Status       Row Name 05/01/25 1105       Functional Status    Usual Activity Tolerance good    Current Activity Tolerance good       Functional Status, IADL    Medications independent    Meal Preparation independent    Housekeeping independent    Laundry independent    Shopping independent    If for any reason you need help with day-to-day activities such as bathing, preparing meals, shopping, managing finances, etc., do you get the help you need? I don't need any help             Megan Naegele, RN     Office Phone: [phone]  Office Cell: [phone]

## 2025-05-01 NOTE — DISCHARGE SUMMARY
[facility]    Mike Redd MD    CHIEF COMPLAINT:     Nausea, htn    HISTORY OF PRESENT ILLNESS:    Abdominal Pain  Associated symptoms: nausea        ED  Patient is a morbidly obese 65-year-old gentleman who comes in with generalized abdominal pain for the last 3 days  -he denies any fever he states he has had some nausea without vomiting. He reports a history of coronary artery disease GERD hypertension hyperlipidemia    Past Medical History:   Diagnosis Date    Coronary artery disease     GERD (gastroesophageal reflux disease)     Hyperlipidemia     Hypertension     Obesity     Presence of aortocoronary bypass graft 2015     Past Surgical History:   Procedure Laterality Date    CARDIAC CATHETERIZATION      CARDIAC SURGERY      CORONARY ARTERY BYPASS GRAFT      CORONARY STENT PLACEMENT      FINGER SURGERY      TOTAL KNEE ARTHROPLASTY Left 2020    Procedure: LEFT TOTAL KNEE;  Surgeon: Fernie Peoples II, MD;  Location: [facility];  Service: Orthopedics;  Laterality: Left;     Family History   Problem Relation Age of Onset    Stroke Mother     Heart disease Mother     Heart disease Father     Heart disease Sister      Social History     Tobacco Use    Smoking status: Former     Current packs/day: 0.00     Average packs/day: 1 pack/day for 20.0 years (20.0 ttl pk-yrs)     Types: Cigarettes     Start date: 1979     Quit date: 1999     Years since quittin.3     Passive exposure: Past    Smokeless tobacco: Never   Vaping Use    Vaping status: Never Used   Substance Use Topics    Alcohol use: No    Drug use: No     Medications Prior to Admission   Medication Sig Dispense Refill Last Dose/Taking    aspirin 81 MG EC tablet Take 1 tablet by mouth Every Night.   2025 Bedtime    esomeprazole (nexIUM) 20 MG capsule Take 1 capsule by mouth Every Morning Before Breakfast.   2025 Morning    hydrALAZINE (APRESOLINE) 50 MG tablet TAKE 1 TABLET BY  MOUTH 2 TIMES A DAY 60 tablet 0 4/30/2025 Morning    hydroCHLOROthiazide 25 MG tablet Take 1 tablet by mouth Daily.   4/30/2025 Morning    lisinopril (PRINIVIL,ZESTRIL) 40 MG tablet Take 1 tablet by mouth Every Evening for 90 days. 90 tablet 3 4/29/2025 Evening    metoprolol tartrate (LOPRESSOR) 50 MG tablet TAKE 1 TABLET BY MOUTH 2 TIMES A DAY (Patient taking differently: Take 2 tablets by mouth 2 (Two) Times a Day.) 180 tablet 1 4/30/2025 Morning    rosuvastatin (CRESTOR) 10 MG tablet Take 1 tablet by mouth Every Night. 90 tablet 3 4/29/2025 Evening    amoxicillin (AMOXIL) 500 MG capsule Take 2 capsules by mouth 3 (Three) Times a Day. (Patient not taking: Reported on 3/17/2025) 30 capsule 1      Allergies:  Onion    Immunization History   Administered Date(s) Administered    COVID-19 (PFIZER) Purple Cap Monovalent 03/17/2021, 04/07/2021    Tdap 08/29/2016           REVIEW OF SYSTEMS:    Review of Systems   Gastrointestinal:  Positive for abdominal pain and nausea.       Vital Signs  Temp:  [97.5 °F (36.4 °C)-98.7 °F (37.1 °C)] 98 °F (36.7 °C)  Heart Rate:  [59-95] 63  Resp:  [15-19] 18  BP: ()/(46-97) 134/77          Physical Exam:  Physical Exam  Constitutional:       Appearance: Normal appearance.   Cardiovascular:      Rate and Rhythm: Normal rate and regular rhythm.   Pulmonary:      Effort: Pulmonary effort is normal.      Breath sounds: Normal breath sounds.   Abdominal:      Palpations: Abdomen is soft.   Skin:     General: Skin is warm and dry.   Neurological:      General: No focal deficit present.      Mental Status: He is alert and oriented to person, place, and time.   Psychiatric:         Mood and Affect: Mood normal.         Behavior: Behavior normal.         Emotional Behavior:    wnl   Debilities:   None    Results Review:    I reviewed the patient's new clinical results.  Lab Results (most recent)       Procedure Component Value Units Date/Time    Basic Metabolic Panel [[id number]]   (Abnormal) Collected: 05/01/25 1344    Specimen: Blood from Arm, Right Updated: 05/01/25 1416     Glucose 121 mg/dL      BUN 25 mg/dL      Creatinine 0.99 mg/dL      Sodium 137 mmol/L      Potassium 4.3 mmol/L      Chloride 105 mmol/L      CO2 22.9 mmol/L      Calcium 9.2 mg/dL      BUN/Creatinine Ratio 25.3     Anion Gap 9.1 mmol/L      eGFR 85.6 mL/min/1.73     Narrative:      GFR Categories in Chronic Kidney Disease (CKD)              GFR Category          GFR (mL/min/1.73)    Interpretation  G1                    90 or greater        Normal or high (1)  G2                    60-89                Mild decrease (1)  G3a                   45-59                Mild to moderate decrease  G3b                   30-44                Moderate to severe decrease  G4                    15-29                Severe decrease  G5                    14 or less           Kidney failure    (1)In the absence of evidence of kidney disease, neither GFR category G1 or G2 fulfill the criteria for CKD.    eGFR calculation 2021 CKD-EPI creatinine equation, which does not include race as a factor    Basic Metabolic Panel [[id number]]  (Abnormal) Collected: 05/01/25 0136    Specimen: Blood from Hand, Right Updated: 05/01/25 0230     Glucose 198 mg/dL      BUN 35 mg/dL      Creatinine 1.32 mg/dL      Sodium 139 mmol/L      Potassium 3.9 mmol/L      Chloride 107 mmol/L      CO2 20.7 mmol/L      Calcium 8.7 mg/dL      BUN/Creatinine Ratio 26.5     Anion Gap 11.3 mmol/L      eGFR 60.6 mL/min/1.73     Narrative:      GFR Categories in Chronic Kidney Disease (CKD)              GFR Category          GFR (mL/min/1.73)    Interpretation  G1                    90 or greater        Normal or high (1)  G2                    60-89                Mild decrease (1)  G3a                   45-59                Mild to moderate decrease  G3b                   30-44                Moderate to severe decrease  G4                    15-29                Severe  decrease  G5                    14 or less           Kidney failure    (1)In the absence of evidence of kidney disease, neither GFR category G1 or G2 fulfill the criteria for CKD.    eGFR calculation 2021 CKD-EPI creatinine equation, which does not include race as a factor    CBC Auto Differential [[id number]]  (Abnormal) Collected: 05/01/25 0136    Specimen: Blood from Hand, Right Updated: 05/01/25 0159     WBC 7.43 10*3/mm3      RBC 4.36 10*6/mm3      Hemoglobin 13.1 g/dL      Hematocrit 40.2 %      MCV 92.2 fL      MCH 30.0 pg      MCHC 32.6 g/dL      RDW 12.2 %      RDW-SD 41.6 fl      MPV 11.0 fL      Platelets 254 10*3/mm3      Neutrophil % 46.3 %      Lymphocyte % 42.7 %      Monocyte % 6.9 %      Eosinophil % 3.6 %      Basophil % 0.4 %      Immature Grans % 0.1 %      Neutrophils, Absolute 3.44 10*3/mm3      Lymphocytes, Absolute 3.17 10*3/mm3      Monocytes, Absolute 0.51 10*3/mm3      Eosinophils, Absolute 0.27 10*3/mm3      Basophils, Absolute 0.03 10*3/mm3      Immature Grans, Absolute 0.01 10*3/mm3      nRBC 0.0 /100 WBC     Comprehensive Metabolic Panel [[id number]]  (Abnormal) Collected: 04/30/25 1935    Specimen: Blood from Arm, Left Updated: 04/30/25 2013     Glucose 163 mg/dL      BUN 42 mg/dL      Creatinine 1.48 mg/dL      Sodium 138 mmol/L      Potassium 4.2 mmol/L      Chloride 106 mmol/L      CO2 20.1 mmol/L      Calcium 9.7 mg/dL      Total Protein 7.1 g/dL      Albumin 4.6 g/dL      ALT (SGPT) 28 U/L      AST (SGOT) 30 U/L      Alkaline Phosphatase 67 U/L      Total Bilirubin 0.3 mg/dL      Globulin 2.5 gm/dL      A/G Ratio 1.8 g/dL      BUN/Creatinine Ratio 28.4     Anion Gap 11.9 mmol/L      eGFR 52.8 mL/min/1.73     Narrative:      GFR Categories in Chronic Kidney Disease (CKD)              GFR Category          GFR (mL/min/1.73)    Interpretation  G1                    90 or greater        Normal or high (1)  G2                    60-89                Mild decrease (1)  G3a                    45-59                Mild to moderate decrease  G3b                   30-44                Moderate to severe decrease  G4                    15-29                Severe decrease  G5                    14 or less           Kidney failure    (1)In the absence of evidence of kidney disease, neither GFR category G1 or G2 fulfill the criteria for CKD.    eGFR calculation 2021 CKD-EPI creatinine equation, which does not include race as a factor    Lipase [[id number]]  (Normal) Collected: 04/30/25 1935    Specimen: Blood from Arm, Left Updated: 04/30/25 2013     Lipase 27 U/L     Urinalysis With Microscopic If Indicated (No Culture) - Urine, Clean Catch [[id number]]  (Abnormal) Collected: 04/30/25 1956    Specimen: Urine, Clean Catch Updated: 04/30/25 2004     Color, UA Yellow     Appearance, UA Clear     pH, UA <=5.0     Specific Gravity, UA 1.024     Glucose, UA Negative     Ketones, UA Trace     Bilirubin, UA Negative     Blood, UA Negative     Protein, UA Negative     Leuk Esterase, UA Negative     Nitrite, UA Negative     Urobilinogen, UA 1.0 E.U./dL    Narrative:      Urine microscopic not indicated.    CBC & Differential [[id number]]  (Abnormal) Collected: 04/30/25 1935    Specimen: Blood from Arm, Left Updated: 04/30/25 1948    Narrative:      The following orders were created for panel order CBC & Differential.  Procedure                               Abnormality         Status                     ---------                               -----------         ------                     CBC Auto Differential[[id number]]        Abnormal            Final result                 Please view results for these tests on the individual orders.    CBC Auto Differential [[id number]]  (Abnormal) Collected: 04/30/25 1935    Specimen: Blood from Arm, Left Updated: 04/30/25 1948     WBC 8.39 10*3/mm3      RBC 4.62 10*6/mm3      Hemoglobin 13.7 g/dL      Hematocrit 41.9 %      MCV 90.7 fL      MCH 29.7 pg      MCHC 32.7 g/dL      RDW  12.1 %      RDW-SD 40.5 fl      MPV 11.0 fL      Platelets 315 10*3/mm3      Neutrophil % 62.8 %      Lymphocyte % 26.2 %      Monocyte % 7.6 %      Eosinophil % 2.7 %      Basophil % 0.6 %      Immature Grans % 0.1 %      Neutrophils, Absolute 5.26 10*3/mm3      Lymphocytes, Absolute 2.20 10*3/mm3      Monocytes, Absolute 0.64 10*3/mm3      Eosinophils, Absolute 0.23 10*3/mm3      Basophils, Absolute 0.05 10*3/mm3      Immature Grans, Absolute 0.01 10*3/mm3      nRBC 0.0 /100 WBC     Blood Culture - Blood, Arm, Left [[id number]] Collected: 04/30/25 1935    Specimen: Blood from Arm, Left Updated: 04/30/25 1946    Blood Culture - Blood, Arm, Right [[id number]] Collected: 04/30/25 1935    Specimen: Blood from Arm, Right Updated: 04/30/25 1946            Imaging Results (Most Recent)       Procedure Component Value Units Date/Time    US Renal Bilateral [[id number]] Collected: 05/01/25 1236     Updated: 05/01/25 1239    Narrative:      US RENAL BILATERAL    Date of Exam: 5/1/2025 12:22 PM EDT    Indication: elevated creatinine.    Comparison: CT abdomen pelvis with contrast 4/30/2025    Technique: Grayscale and color Doppler ultrasound evaluation of the kidneys and urinary bladder was performed.      Findings:  The right kidney measures 10.5 x 7.5 x 6.5 cm. The left kidney measures 11.5 x 7.1 x 6.1 cm. Corticomedullary differentiation is maintained. No perinephric fluid collection. There is no debris in the bladder.      Impression:      Impression:  No hydronephrosis or acute sonographic abnormality.        Electronically Signed: Robinson Cabello MD    5/1/2025 12:37 PM EDT    Workstation ID: [id number]    CT Abdomen Pelvis With Contrast [[id number]] Collected: 04/30/25 2110     Updated: 04/30/25 2116    Narrative:      CT ABDOMEN PELVIS W CONTRAST    Date of Exam: 4/30/2025 8:45 PM EDT    Indication: abd pain.    Comparison: None available.    Technique: Axial CT images were obtained of the abdomen and pelvis following the  uneventful intravenous administration of iodinated contrast. Sagittal and coronal reconstructions were performed.  Automated exposure control and iterative reconstruction   methods were used.    FINDINGS:    Lung bases: No masses. No consolidation. Median sternotomy wires. Atheromatous disease of the coronary vessels.    Liver:No masses. No intrahepatic biliary ductal dilatation.    Spleen:No masses. No perisplenic hematoma.    Pancreas:No pancreatic masses. No evidence of pancreatitis.    Gallbladder and common bile duct:No evidence of cholelithiasis. No evidence of cholecystitis.    Adrenal glands:No adrenal masses    Kidneys and ureters:No kidney stones. No renal masses.No calculi present within the ureters. Normal caliber ureters.    Urinary bladder:No urinary bladder wall thickening. No bladder masses.    Small bowel:Normal caliber small bowel.    Large bowel: Colonic diverticulosis without evidence of diverticulitis.    Appendix: Normal    GENITOURINARY: Normal prostate    Ascites or pneumoperitoneum:None.    Adenopathy:None present    Osseous structures: The proximal femurs are intact. The pubic bones are intact. The sacrum and sacroiliac joints are normal. Degenerative changes of the lumbar spine. The spinous and transverse processes are intact.    Other findings: Atheromatous disease of the abdominal aorta and visualized branches.      Impression:      1.No acute findings in the abdomen or pelvis.  2.Colonic diverticulosis without evidence of diverticulitis.        Electronically Signed: Phillip Almonte MD    4/30/2025 9:14 PM EDT    Workstation ID: [id number]          reviewed    ECG/EMG Results (most recent)       Procedure Component Value Units Date/Time    Telemetry Scan [[id number]] Resulted: 04/30/25     Updated: 05/01/25 0105    Telemetry Scan [[id number]] Resulted: 04/30/25     Updated: 05/01/25 0457    Telemetry Scan [[id number]] Resulted: 04/30/25     Updated: 05/01/25 1236          reviewed    Results  for orders placed during the hospital encounter of 06/22/24    Duplex Carotid Ultrasound CAR    Interpretation Summary    Right internal carotid artery is occluded.    Left internal carotid artery demonstrates greater than 70% stenosis but less than near occlusion.  Velocities may be overestimated secondary to contralateral occlusion.      Results for orders placed during the hospital encounter of 06/22/24    Adult Transthoracic Echo Complete W/ Cont if Necessary Per Protocol    Interpretation Summary    Left ventricular ejection fraction appears to be 61 - 65%.    The right ventricular cavity is mildly dilated.    The left atrial cavity is severely dilated.    Mild dilation of the aortic root is present.      Microbiology Results (last 10 days)       ** No results found for the last 240 hours. **            Assessment & Plan     Renal insufficiency     HTN  - cbc, ua unremarkable  - creatinine elevated 1.48, 1.32, .99  - Us renal unremarkable  - ct abd reviewed and showing no acute findings  - iv fluids given  - continue metoprolol, lisinopril, hydralazine  - start clonidine  - hold hctz  - follow up with pcp        I discussed the patients findings and my recommendations with patient and nursing staff.     Discharge Diagnosis:      Renal insufficiency      Hospital Course  Patient is a 65 y.o. male presented with htn and nausea. Er evaluated and admitted to observation. Labs including cbc and ua are normal. Renal us is normal. Creatinine is elevated but normalized after iv fluids. Ct abd is normal. Pt to continue metoprolol, lisinopril, hydralazine. Stop hctz. Follow up with pcp. Start clonidine. Discharge discussed with pt and he is agreeable to plan. Instructed pt to return to er if symptoms reoccur or worsen.   .      Past Medical History:     Past Medical History:   Diagnosis Date    Coronary artery disease     GERD (gastroesophageal reflux disease)     Hyperlipidemia     Hypertension     Obesity     Presence  of aortocoronary bypass graft 2015       Past Surgical History:     Past Surgical History:   Procedure Laterality Date    CARDIAC CATHETERIZATION      CARDIAC SURGERY      CORONARY ARTERY BYPASS GRAFT      CORONARY STENT PLACEMENT      FINGER SURGERY      TOTAL KNEE ARTHROPLASTY Left 2020    Procedure: LEFT TOTAL KNEE;  Surgeon: Fernie Peoples II, MD;  Location: [facility];  Service: Orthopedics;  Laterality: Left;       Social History:   Social History     Socioeconomic History    Marital status:    Tobacco Use    Smoking status: Former     Current packs/day: 0.00     Average packs/day: 1 pack/day for 20.0 years (20.0 ttl pk-yrs)     Types: Cigarettes     Start date: 1979     Quit date: 1999     Years since quittin.3     Passive exposure: Past    Smokeless tobacco: Never   Vaping Use    Vaping status: Never Used   Substance and Sexual Activity    Alcohol use: No    Drug use: No    Sexual activity: Defer       Procedures Performed         Consults:   Consults       No orders found for last 30 day(s).            Condition on Discharge:     Stable    Discharge Disposition  Home or Self Care    Discharge Medications     Discharge Medications        New Medications        Instructions Start Date   cloNIDine 0.1 MG tablet  Commonly known as: CATAPRES   0.1 mg, Oral, Every 12 Hours Scheduled             Changes to Medications        Instructions Start Date   metoprolol tartrate 50 MG tablet  Commonly known as: LOPRESSOR  What changed: how much to take   50 mg, Oral, 2 Times Daily             Continue These Medications        Instructions Start Date   amoxicillin 500 MG capsule  Commonly known as: AMOXIL   1,000 mg, Oral, 3 Times Daily      aspirin 81 MG EC tablet   81 mg, Nightly      esomeprazole 20 MG capsule  Commonly known as: nexIUM   20 mg, Every Morning Before Breakfast      hydrALAZINE 50 MG tablet  Commonly known as: APRESOLINE   50 mg, Oral, 2 Times Daily       lisinopril 40 MG tablet  Commonly known as: PRINIVIL,ZESTRIL   40 mg, Oral, Every Evening      rosuvastatin 10 MG tablet  Commonly known as: CRESTOR   10 mg, Oral, Nightly             Stop These Medications      hydroCHLOROthiazide 25 MG tablet              Discharge Diet:     Activity at Discharge:     Follow-up Appointments  Future Appointments   Date Time Provider Department Center   5/14/2025  8:30 AM Mike Redd MD [facility]   9/5/2025  2:30 PM Randy Tejeda MD [facility]   9/17/2025  4:15 PM Hubert Fuller MD [facility]   11/12/2025  3:00 PM Mike Redd MD [facility]     Additional Instructions for the Follow-ups that You Need to Schedule       Discharge Follow-up with PCP   As directed       Currently Documented PCP:    Mike Redd MD    PCP Phone Number:    [phone]     Follow Up Details: 2 weeks                Test Results Pending at Discharge  Pending Results       Procedure [Order ID] Specimen - Date/Time    Blood Culture - Blood, Arm, Left [[id number]] Collected: 04/30/25 1935    Specimen: Blood from Arm, Left Updated: 04/30/25 1946    Blood Culture - Blood, Arm, Right [[id number]] Collected: 04/30/25 1935    Specimen: Blood from Arm, Right Updated: 04/30/25 1946             Risk for Readmission (LACE) Score: 2 (5/1/2025  6:00 AM)      Less Than 30 minutes spent in discharge activities for this patient    Signature:Electronically signed by Janelle Walker PA-C, 05/01/25, 4:04 PM EDT.

## 2025-05-01 NOTE — ED PROVIDER NOTES
Subjective   History of Present Illness  Patient is a morbidly obese 65-year-old gentleman who comes in with generalized abdominal pain for the last 3 days  -he denies any fever he states he has had some nausea without vomiting.    He reports a history of coronary artery disease GERD hypertension hyperlipidemia      Review of Systems   Constitutional:  Negative for fever.   Gastrointestinal:  Positive for abdominal pain and nausea.       Past Medical History:   Diagnosis Date    Coronary artery disease     GERD (gastroesophageal reflux disease)     Hyperlipidemia     Hypertension     Obesity     Presence of aortocoronary bypass graft 2015       Allergies   Allergen Reactions    Onion Nausea And Vomiting       Past Surgical History:   Procedure Laterality Date    CARDIAC CATHETERIZATION      CARDIAC SURGERY      CORONARY ARTERY BYPASS GRAFT      CORONARY STENT PLACEMENT      FINGER SURGERY      TOTAL KNEE ARTHROPLASTY Left 2020    Procedure: LEFT TOTAL KNEE;  Surgeon: Fernie Peoples II, MD;  Location: [facility];  Service: Orthopedics;  Laterality: Left;       Family History   Problem Relation Age of Onset    Stroke Mother     Heart disease Mother     Heart disease Father     Heart disease Sister        Social History     Socioeconomic History    Marital status:    Tobacco Use    Smoking status: Former     Current packs/day: 0.00     Average packs/day: 1 pack/day for 20.0 years (20.0 ttl pk-yrs)     Types: Cigarettes     Start date: 1979     Quit date: 1999     Years since quittin.3     Passive exposure: Past    Smokeless tobacco: Never   Vaping Use    Vaping status: Never Used   Substance and Sexual Activity    Alcohol use: No    Drug use: No    Sexual activity: Defer           Objective   Physical Exam  Vitals reviewed.   Constitutional:       Appearance: He is well-developed. He is obese.   HENT:      Head: Normocephalic and atraumatic.   Eyes:       "Conjunctiva/sclera: Conjunctivae normal.      Pupils: Pupils are equal, round, and reactive to light.   Cardiovascular:      Rate and Rhythm: Normal rate and regular rhythm.      Heart sounds: Normal heart sounds.   Pulmonary:      Effort: Pulmonary effort is normal.      Breath sounds: Normal breath sounds.   Abdominal:      General: Abdomen is protuberant. Bowel sounds are normal.      Palpations: Abdomen is soft.      Tenderness: There is generalized abdominal tenderness. There is no right CVA tenderness, left CVA tenderness, guarding or rebound.   Musculoskeletal:         General: Normal range of motion.      Cervical back: Normal range of motion and neck supple.   Skin:     General: Skin is warm and dry.   Neurological:      Mental Status: He is alert and oriented to person, place, and time.      GCS: GCS eye subscore is 4. GCS verbal subscore is 5. GCS motor subscore is 6.         Procedures           ED Course  ED Course as of 04/30/25 2149 Wed Apr 30, 2025 2030 Marked ready for CT [KW]   2144 Patient discussed with Dr. Dietz who agreed patient should be kept overnight in observation for hydration and reevaluation of renal function in the more [KW]      ED Course User Index  [KW] Nelsy Neal, APRN    /77   Pulse 64   Temp 98.7 °F (37.1 °C) (Oral)   Resp 18   Ht 177.8 cm (70\")   Wt 130 kg (287 lb 0.6 oz)   SpO2 94%   BMI 41.19 kg/m²   Labs Reviewed   COMPREHENSIVE METABOLIC PANEL - Abnormal; Notable for the following components:       Result Value    Glucose 163 (*)     BUN 42 (*)     Creatinine 1.48 (*)     CO2 20.1 (*)     BUN/Creatinine Ratio 28.4 (*)     eGFR 52.8 (*)     All other components within normal limits    Narrative:     GFR Categories in Chronic Kidney Disease (CKD)              GFR Category          GFR (mL/min/1.73)    Interpretation  G1                    90 or greater        Normal or high (1)  G2                    60-89                Mild decrease (1)  G3a       "             45-59                Mild to moderate decrease  G3b                   30-44                Moderate to severe decrease  G4                    15-29                Severe decrease  G5                    14 or less           Kidney failure    (1)In the absence of evidence of kidney disease, neither GFR category G1 or G2 fulfill the criteria for CKD.    eGFR calculation 2021 CKD-EPI creatinine equation, which does not include race as a factor   URINALYSIS W/ MICROSCOPIC IF INDICATED (NO CULTURE) - Abnormal; Notable for the following components:    Ketones, UA Trace (*)     All other components within normal limits    Narrative:     Urine microscopic not indicated.   CBC WITH AUTO DIFFERENTIAL - Abnormal; Notable for the following components:    RDW 12.1 (*)     All other components within normal limits   LIPASE - Normal   BLOOD CULTURE   BLOOD CULTURE   CBC AND DIFFERENTIAL    Narrative:     The following orders were created for panel order CBC & Differential.  Procedure                               Abnormality         Status                     ---------                               -----------         ------                     CBC Auto Differential[[id number]]        Abnormal            Final result                 Please view results for these tests on the individual orders.     Medications   sodium chloride 0.9 % flush 10 mL (has no administration in time range)   sodium chloride 0.9 % bolus 500 mL (500 mL Intravenous New Bag 4/30/25 2126)   sodium chloride 0.9 % bolus 500 mL (has no administration in time range)   sodium chloride 0.9 % infusion (100 mL/hr Intravenous New Bag 4/30/25 2137)   sodium chloride 0.9 % flush 10 mL (has no administration in time range)   sodium chloride 0.9 % flush 10 mL (has no administration in time range)   sodium chloride 0.9 % infusion 40 mL (has no administration in time range)   ondansetron (ZOFRAN) injection 4 mg (has no administration in time range)   melatonin  tablet 5 mg (has no administration in time range)   Pharmacy to Dose enoxaparin (LOVENOX) (has no administration in time range)   acetaminophen (TYLENOL) tablet 650 mg (has no administration in time range)     Or   acetaminophen (TYLENOL) 160 MG/5ML oral solution 650 mg (has no administration in time range)     Or   acetaminophen (TYLENOL) suppository 650 mg (has no administration in time range)   HYDROcodone-acetaminophen (NORCO) 5-325 MG per tablet 1 tablet (has no administration in time range)   sennosides-docusate (PERICOLACE) 8.6-50 MG per tablet 2 tablet (has no administration in time range)     And   polyethylene glycol (MIRALAX) packet 17 g (has no administration in time range)     And   bisacodyl (DULCOLAX) EC tablet 5 mg (has no administration in time range)     And   bisacodyl (DULCOLAX) suppository 10 mg (has no administration in time range)   ondansetron (ZOFRAN) injection 4 mg (4 mg Intravenous Given 4/30/25 2123)   HYDROmorphone (DILAUDID) injection 0.5 mg (0.5 mg Intravenous Given 4/30/25 2123)   iopamidol (ISOVUE-370) 76 % injection 100 mL (100 mL Intravenous Given 4/30/25 2102)   dicyclomine (BENTYL) capsule 40 mg (40 mg Oral Given 4/30/25 2137)     CT Abdomen Pelvis With Contrast  Result Date: 4/30/2025  1.No acute findings in the abdomen or pelvis. 2.Colonic diverticulosis without evidence of diverticulitis. Electronically Signed: Phillip Almonte MD  4/30/2025 9:14 PM EDT  Workstation ID: [id number]                                                       Medical Decision Making  Patient is a 65-year-old morbidly obese man whose had belly pain for the last 3 days concerning for appendicitis diverticulitis bowel obstruction pancreatitis this is not an all-inclusive patient had IV established and blood work was obtained and reviewed the patient was found of a normal white blood cell count normal hemoglobin and hematocrit patient was found to have a decrease in his kidney function with an elevated BUN and  creatinine of 42 and 1.48 chart review shows that 2 months ago he was a BUN of 14 and a creatinine of 1.09 the patient was given a 500 cc bolus and then was placed on fluids at 100 an hour he will be placed in ED observation after discussion with Dr. Dietz who agrees the patient should be hydrated and rechecked in the morning for kidney function the patient and his wife at bedside were agreeable this plan of care.    Patient was given Dilaudid and Zofran for pain here in the emergency room he was also given some Bentyl and he will be switched to oral medication when placed in observation    Problems Addressed:  Generalized abdominal pain: complicated acute illness or injury  Mild renal insufficiency: complicated acute illness or injury    Amount and/or Complexity of Data Reviewed  External Data Reviewed: labs.  Labs: ordered. Decision-making details documented in ED Course.  Radiology: ordered and independent interpretation performed. Decision-making details documented in ED Course.  ECG/medicine tests: ordered and independent interpretation performed. Decision-making details documented in ED Course.    Risk  OTC drugs.  Prescription drug management.  Decision regarding hospitalization.        Final diagnoses:   Generalized abdominal pain   Mild renal insufficiency       ED Disposition  ED Disposition       ED Disposition   Decision to Admit    Condition   --    Comment   --               No follow-up provider specified.       Medication List      No changes were made to your prescriptions during this visit.            Nelsy Neal, APRN  04/30/25 9780

## 2025-05-01 NOTE — PLAN OF CARE
Problem: Adult Inpatient Plan of Care  Goal: Absence of Hospital-Acquired Illness or Injury  Intervention: Identify and Manage Fall Risk  Recent Flowsheet Documentation  Taken 5/1/2025 0200 by Ingris Soni RN  Safety Promotion/Fall Prevention:   safety round/check completed   clutter free environment maintained   assistive device/personal items within reach   lighting adjusted  Taken 5/1/2025 0110 by Ingris Soni RN  Safety Promotion/Fall Prevention:   assistive device/personal items within reach   safety round/check completed   clutter free environment maintained   lighting adjusted  Taken 5/1/2025 0002 by Ingris Soni RN  Safety Promotion/Fall Prevention:   assistive device/personal items within reach   safety round/check completed   clutter free environment maintained  Taken 4/30/2025 2315 by Ingris Soni RN  Safety Promotion/Fall Prevention:   safety round/check completed   lighting adjusted   clutter free environment maintained   assistive device/personal items within reach  Taken 4/30/2025 2300 by Ingris Soni RN  Safety Promotion/Fall Prevention:   clutter free environment maintained   assistive device/personal items within reach   lighting adjusted   safety round/check completed  Intervention: Prevent Skin Injury  Recent Flowsheet Documentation  Taken 4/30/2025 2315 by Ingris Soni RN  Body Position: position changed independently  Intervention: Prevent Infection  Recent Flowsheet Documentation  Taken 5/1/2025 0200 by Ingris Soni RN  Infection Prevention:   rest/sleep promoted   personal protective equipment utilized   hand hygiene promoted   equipment surfaces disinfected   environmental surveillance performed   cohorting utilized  Taken 5/1/2025 0110 by Ingris Soni RN  Infection Prevention:   rest/sleep promoted   personal protective equipment utilized   hand hygiene promoted   equipment surfaces disinfected   environmental  surveillance performed   cohorting utilized  Taken 5/1/2025 0002 by Ingris Soni RN  Infection Prevention:   rest/sleep promoted   personal protective equipment utilized   hand hygiene promoted   equipment surfaces disinfected   environmental surveillance performed   cohorting utilized  Taken 4/30/2025 2315 by Ingris Soni RN  Infection Prevention:   single patient room provided   rest/sleep promoted   personal protective equipment utilized   hand hygiene promoted   equipment surfaces disinfected   environmental surveillance performed   cohorting utilized  Taken 4/30/2025 2300 by Ingris Soni RN  Infection Prevention:   rest/sleep promoted   personal protective equipment utilized   hand hygiene promoted   equipment surfaces disinfected   environmental surveillance performed   cohorting utilized  Goal: Optimal Comfort and Wellbeing  Intervention: Provide Person-Centered Care  Recent Flowsheet Documentation  Taken 4/30/2025 2315 by Ingris Soni RN  Trust Relationship/Rapport:   questions encouraged   thoughts/feelings acknowledged   reassurance provided   questions answered   empathic listening provided   emotional support provided   choices provided   care explained  Goal: Readiness for Transition of Care  Intervention: Mutually Develop Transition Plan  Recent Flowsheet Documentation  Taken 4/30/2025 2308 by Ingris Soni RN  Transportation Anticipated: car, drives self  Patient/Family Anticipated Services at Transition: none  Patient/Family Anticipates Transition to: home with family  Taken 4/30/2025 2305 by Ingris Soni RN  Equipment Currently Used at Home: none     Problem: Acute Kidney Injury/Impairment  Goal: Effective Renal Function  Intervention: Monitor and Support Renal Function  Recent Flowsheet Documentation  Taken 4/30/2025 2315 by Ingris Soni RN  Medication Review/Management: medications reviewed   Goal Outcome Evaluation:      Pt resting in  bed comfortably without complaints of pain, will continue to monitor.

## 2025-05-01 NOTE — PLAN OF CARE
Goal Outcome Evaluation:    IVF infusing. Plan for US renal today.

## 2025-05-02 ENCOUNTER — TRANSITIONAL CARE MANAGEMENT TELEPHONE ENCOUNTER (OUTPATIENT)
Dept: CALL CENTER | Facility: HOSPITAL | Age: 65
End: 2025-05-02
Payer: MEDICARE

## 2025-05-02 NOTE — OUTREACH NOTE
Call Center TCM Note      Flowsheet Row Responses   [facility], [place], operated by [facility] patient discharged from? [facility]   Does the patient have one of the following disease processes/diagnoses(primary or secondary)? Other   TCM attempt successful? Yes   Call start time 1524   Call end time 1528   Discharge diagnosis Renal insufficiency   Meds reviewed with patient/caregiver? Yes   Is the patient having any side effects they believe may be caused by any medication additions or changes? No   Does the patient have all medications ordered at discharge? Yes   Prescription comments New rx Clonidine in place. HCTZ discontinued   Is the patient taking all medications as directed (includes completed medication regime)? Yes   Comments TCM APPT WITH PCP DR ORALIA KC IS 05/14/2025   Does the patient have an appointment with their PCP within 7-14 days of discharge? Yes   Has home health visited the patient within 72 hours of discharge? N/A   Psychosocial issues? No   Did the patient receive a copy of their discharge instructions? Yes   Nursing interventions Reviewed instructions with patient   What is the patient's perception of their health status since discharge? Improving   Is the patient/caregiver able to teach back signs and symptoms related to disease process for when to call PCP? Yes   Is the patient/caregiver able to teach back signs and symptoms related to disease process for when to call 911? Yes   Is the patient/caregiver able to teach back the hierarchy of who to call/visit for symptoms/problems? PCP, Specialist, Home health nurse, Urgent Care, ED, 911 Yes   If the patient is a current smoker, are they able to teach back resources for cessation? Not a smoker   TCM call completed? Yes   Wrap up additional comments D/C DX: Renal insufficiency - Pt states he does not feel great, but better than before admit. No questions at this time. TCM APPT with PCP Dr Oralia Kc is 05/14/2025   Call end time 1528            [facility]  Assistant    5/2/2025, 15:34 EDT

## 2025-05-05 LAB
BACTERIA SPEC AEROBE CULT: NORMAL
BACTERIA SPEC AEROBE CULT: NORMAL

## 2025-05-14 ENCOUNTER — OFFICE VISIT (OUTPATIENT)
Dept: FAMILY MEDICINE CLINIC | Facility: CLINIC | Age: 65
End: 2025-05-14
Payer: MEDICARE

## 2025-05-14 VITALS
BODY MASS INDEX: 43.09 KG/M2 | HEIGHT: 70 IN | HEART RATE: 62 BPM | RESPIRATION RATE: 16 BRPM | WEIGHT: 301 LBS | DIASTOLIC BLOOD PRESSURE: 84 MMHG | SYSTOLIC BLOOD PRESSURE: 146 MMHG | OXYGEN SATURATION: 95 %

## 2025-05-14 DIAGNOSIS — I10 PRIMARY HYPERTENSION: ICD-10-CM

## 2025-05-14 DIAGNOSIS — E66.01 MORBID (SEVERE) OBESITY DUE TO EXCESS CALORIES: ICD-10-CM

## 2025-05-14 DIAGNOSIS — K55.9 ISCHEMIC COLITIS, ENTERITIS, OR ENTEROCOLITIS: Primary | ICD-10-CM

## 2025-05-14 DIAGNOSIS — I25.708 CORONARY ARTERY DISEASE OF BYPASS GRAFT OF NATIVE HEART WITH STABLE ANGINA PECTORIS: ICD-10-CM

## 2025-05-14 NOTE — PROGRESS NOTES
Chief Complaint  Hypertension and Hospital Follow Up Visit    Subjective        Flaco Yan presents to [facility]  History of Present Illness  The patient presents for follow-up on blood pressure, ischemic colitis, and coronary artery disease.    He was hospitalized on 04/30/2025 and 05/01/2025 due to severe abdominal pain, described as a knot in his stomach. The pain initially subsided but returned with greater intensity. He also experienced nausea and a sensation of fullness, akin to a water balloon, without any noticeable weight gain. His bowel movements were infrequent and soft. A CT scan of his abdomen was performed, revealing no abnormalities. His BUN level was elevated at 42, prompting an ultrasound of his kidneys. Post-hospitalization, his BUN level decreased to 16 and creatinine to 0.98. His last colonoscopy was approximately 6 to 7 years ago, with no significant findings reported.    He reports recent swelling, which he attributes to changes in his medication regimen. He has been wearing compression stockings but finds them uncomfortable and hot. He has not had a recent eye examination. He reports itching in his left eye, which he believes is due to grass cutting the previous night. He also reports a sensation of a foreign body in his eye. He has lost a molar tooth and has been informed by his dentist that his sinus cavity has sunk, causing one eye to appear more open than the other.    His blood pressure readings have been variable, with systolic readings ranging from 180 to 146 and diastolic readings from 80 to 100. He monitors his blood pressure at home, noting higher readings in the morning and lower readings in the evening after medication administration. He takes metoprolol, hydralazine, clonidine twice daily, and lisinopril at night. He reports that lisinopril appears to be ineffective, even after a dosage increase. He experiences episodes of hypotension, with  "blood pressure readings as low as 100/62, which he believes may be related to clonidine. These episodes are associated with difficulty walking. He has been advised to avoid climbing ladders during these episodes. He was previously on hydrochlorothiazide and spironolactone, but these were discontinued due to renal impairment. His metoprolol dosage was reduced from 250 mg to 100 mg daily.    He is trying to lose weight. He is not interested in bariatric surgery and cannot afford the medications like the GLP-1's.    He has coronary artery disease and is status post bypass grafting. He has not had any symptoms compatible with angina recently.       Objective   Vital Signs:  /84   Pulse 62   Resp 16   Ht 177.8 cm (70\")   Wt (!) 137 kg (301 lb)   SpO2 95%   BMI 43.19 kg/m²   Estimated body mass index is 43.19 kg/m² as calculated from the following:    Height as of this encounter: 177.8 cm (70\").    Weight as of this encounter: 137 kg (301 lb).               Physical Exam  Constitutional:       Appearance: Normal appearance. He is well-developed and normal weight.   HENT:      Head: Normocephalic and atraumatic.      Right Ear: Tympanic membrane, ear canal and external ear normal.      Left Ear: Tympanic membrane, ear canal and external ear normal.      Nose: Nose normal.      Mouth/Throat:      Mouth: Mucous membranes are moist.      Pharynx: Oropharynx is clear. No oropharyngeal exudate.   Eyes:      Extraocular Movements: Extraocular movements intact.      Conjunctiva/sclera: Conjunctivae normal.      Pupils: Pupils are equal, round, and reactive to light.   Cardiovascular:      Rate and Rhythm: Normal rate and regular rhythm.      Pulses: Normal pulses.      Heart sounds: Normal heart sounds.   Pulmonary:      Effort: Pulmonary effort is normal.      Breath sounds: Normal breath sounds.   Abdominal:      General: Bowel sounds are normal.      Palpations: Abdomen is soft.   Musculoskeletal:         General: " Normal range of motion.      Cervical back: Normal range of motion and neck supple.   Skin:     General: Skin is warm and dry.   Neurological:      General: No focal deficit present.      Mental Status: He is alert and oriented to person, place, and time. Mental status is at baseline.   Psychiatric:         Mood and Affect: Mood normal.         Behavior: Behavior normal.         Thought Content: Thought content normal.         Judgment: Judgment normal.        Result Review :          Results  Labs   - BUN: 05/08/2025, 42, then decreased to 16   - Creatinine: 05/08/2025, 0.98   - Glucose: 05/08/2025, 121   - Lipid Panel: 05/08/2025, Normal   - Prostate: 05/08/2025, Normal   - A1c: 6    Imaging   - CT scan of abdomen and pelvis: 04/30/2025, No acute changes, colonic diverticulosis without diverticulitis                Assessment & Plan  1. Primary hypertension.  He comes in today to follow up on elevated blood pressure. When he first came in, his blood pressure was 146/84. I took it later in his left arm. It was 120/78 and then I took it in his right arm and it was 130/78. His home readings are good except first thing in the morning before he takes his medicines, he will sometimes be in the 160s and his diastolics in the 90s. In the late afternoon, he sometimes actually gets a little too low. He is currently on metoprolol, hydralazine, clonidine, and lisinopril. He is advised to check his blood pressure first thing in the morning and 12 hours later before his evening dose of medications. If his blood pressure continues to drop too low, adjustments to his medication regimen will be considered.    2. Morbid obesity.  His BMI is 43 with a weight of 301 pounds. He is trying to lose weight but is not interested in bariatric surgery and cannot afford medications like GLP-1s. Recent blood work, including A1c and lipid panels, looked good. He is advised to continue efforts to lose weight through diet and activity. If weight  loss is not achieved, revisiting the use of GLP-1 medications will be considered.    3. Ischemic colitis.  He was admitted to the emergency room a couple of weeks ago due to abdominal bloating and pain. A colonoscopy done 7 years ago found an ischemic ulcer in his ascending colon and diverticulosis. A recent CT scan did not show diverticulitis. It is suspected that he had a bout of ischemic colitis causing the abdominal pain and bloating. A referral to gastroenterology, Dr. Amezquita, will be made to evaluate for recurring ischemic colitis.    4. Coronary artery disease.  He has coronary artery disease and is status post bypass grafting. He has not had any recent symptoms compatible with angina. He is encouraged to lose weight, keep his blood pressure down, and maintain control of his lipid panel. More aggressive treatment for weight management may be needed. His progress will be monitored over the next 6 months to a year.    5. Health maintenance.  He is advised to get his eyes checked by an ophthalmologist to rule out glaucoma, macular degeneration, or other issues. A physical with blood work is planned around November or December.            Follow Up     Return in about 6 months (around 11/14/2025) for Recheck.  Patient was given instructions and counseling regarding his condition or for health maintenance advice. Please see specific information pulled into the AVS if appropriate.     Patient or patient representative verbalized consent for the use of Ambient Listening during the visit with  Mike Redd MD for chart documentation. 5/14/2025  09:09 EDT

## 2025-05-19 ENCOUNTER — TELEPHONE (OUTPATIENT)
Dept: FAMILY MEDICINE CLINIC | Facility: CLINIC | Age: 65
End: 2025-05-19
Payer: MEDICARE

## 2025-05-19 NOTE — TELEPHONE ENCOUNTER
Patient said he tried sending a [employer] message on Saturday but it wouldn't let him. I gave him the phone number for the [employer] help desk to get it fixed.    Patient is concerned about his b lood pressure. Said it has been dropping a lot after taking his 3 bp meds.     He gave me -     Saturday   149/92 in AM                    68/50 at 3pm    Sunday      112/67 in AM    Today         127/91 in AM    Do you want him to make any changes in his bp meds?

## 2025-05-19 NOTE — TELEPHONE ENCOUNTER
Gave message to patient at 4:16pm. He voiced understanding. (I called him because I knew he was having trouble with his MyChart.)

## 2025-07-17 ENCOUNTER — OFFICE (AMBULATORY)
Dept: URBAN - METROPOLITAN AREA CLINIC 64 | Facility: CLINIC | Age: 65
End: 2025-07-17
Payer: MEDICARE

## 2025-07-17 VITALS
DIASTOLIC BLOOD PRESSURE: 108 MMHG | HEIGHT: 70 IN | SYSTOLIC BLOOD PRESSURE: 182 MMHG | WEIGHT: 291 LBS | SYSTOLIC BLOOD PRESSURE: 169 MMHG | HEART RATE: 51 BPM | DIASTOLIC BLOOD PRESSURE: 112 MMHG

## 2025-07-17 DIAGNOSIS — K59.00 CONSTIPATION, UNSPECIFIED: ICD-10-CM

## 2025-07-17 DIAGNOSIS — R10.9 UNSPECIFIED ABDOMINAL PAIN: ICD-10-CM

## 2025-07-17 DIAGNOSIS — Z86.0101 PERSONAL HISTORY OF ADENOMATOUS AND SERRATED COLON POLYPS: ICD-10-CM

## 2025-07-17 PROCEDURE — 99204 OFFICE O/P NEW MOD 45 MIN: CPT

## 2025-08-01 ENCOUNTER — OFFICE (AMBULATORY)
Dept: URBAN - METROPOLITAN AREA PATHOLOGY 19 | Facility: PATHOLOGY | Age: 65
End: 2025-08-01
Payer: MEDICARE

## 2025-08-01 DIAGNOSIS — D12.0 BENIGN NEOPLASM OF CECUM: ICD-10-CM

## 2025-08-01 DIAGNOSIS — K62.1 RECTAL POLYP: ICD-10-CM

## 2025-08-01 DIAGNOSIS — D12.2 BENIGN NEOPLASM OF ASCENDING COLON: ICD-10-CM

## 2025-08-01 DIAGNOSIS — D12.3 BENIGN NEOPLASM OF TRANSVERSE COLON: ICD-10-CM

## 2025-08-01 PROBLEM — K63.5 POLYP OF COLON: Status: ACTIVE | Noted: 2025-08-01

## 2025-08-01 PROBLEM — K64.1 SECOND DEGREE HEMORRHOIDS: Status: ACTIVE | Noted: 2025-08-01

## 2025-08-01 PROBLEM — K57.30 DIVERTICULOSIS OF LARGE INTESTINE WITHOUT PERFORATION OR ABS: Status: ACTIVE | Noted: 2025-08-01

## 2025-08-01 PROBLEM — Z86.010 SURVEILLANCE DUE TO PRIOR COLONIC NEOPLASIA: Status: ACTIVE | Noted: 2025-08-01

## 2025-08-01 PROCEDURE — 88305 TISSUE EXAM BY PATHOLOGIST: CPT | Mod: 26 | Performed by: PATHOLOGY

## 2025-08-08 RX ORDER — ROSUVASTATIN CALCIUM 10 MG/1
10 TABLET, COATED ORAL NIGHTLY
Qty: 90 TABLET | Refills: 3 | Status: SHIPPED | OUTPATIENT
Start: 2025-08-08

## 2025-08-29 RX ORDER — LISINOPRIL 40 MG/1
40 TABLET ORAL EVERY EVENING
Qty: 90 TABLET | Refills: 3 | Status: SHIPPED | OUTPATIENT
Start: 2025-08-29 | End: 2025-11-27

## 2025-08-29 RX ORDER — ROSUVASTATIN CALCIUM 10 MG/1
10 TABLET, COATED ORAL NIGHTLY
Qty: 90 TABLET | Refills: 3 | Status: SHIPPED | OUTPATIENT
Start: 2025-08-29

## 2025-08-29 RX ORDER — METOPROLOL TARTRATE 50 MG
50 TABLET ORAL 2 TIMES DAILY
Qty: 180 TABLET | Refills: 1 | Status: SHIPPED | OUTPATIENT
Start: 2025-08-29

## (undated) DEVICE — GLV SURG DERMASSURE GRN LF PF 8.5

## (undated) DEVICE — GLV SURG SENSICARE PI ORTHO PF SZ7 LF STRL

## (undated) DEVICE — CUFF TOURNI 1BLADDER 1PRT 42IN STRL

## (undated) DEVICE — GLV SURG SENSICARE PI PF LF 7 GRN STRL

## (undated) DEVICE — VIOLET BRAIDED (POLYGLACTIN 910), SYNTHETIC ABSORBABLE SUTURE: Brand: COATED VICRYL

## (undated) DEVICE — PK TOTL KN 50

## (undated) DEVICE — PAD COLD/THRP KN POLAR/CARE WRAP/ON XL

## (undated) DEVICE — DUAL CUT SAGITTAL BLADE

## (undated) DEVICE — SOL IRR NACL 0.9PCT 3000ML

## (undated) DEVICE — SOL ISO/ALC RUB 70PCT 4OZ

## (undated) DEVICE — RIMMED SPEED PIN 45MM STERILE

## (undated) DEVICE — GENESIS TROCHLEAR PIN 1/8 X 3: Brand: GENESIS

## (undated) DEVICE — SOL NACL 0.9PCT 1000ML

## (undated) DEVICE — BOWL MIXING BONE CEMENT KIT: Brand: MEDLINE INDUSTRIES, INC.

## (undated) DEVICE — DRSNG BARR INSUL KN POLAR/CARE S/ADHS LG

## (undated) DEVICE — ZIPPERED TOGA, 2X LARGE: Brand: FLYTE

## (undated) DEVICE — APPL CHLORAPREP HI/LITE 26ML ORNG

## (undated) DEVICE — IRRIGATOR BULB ASEPTO 60CC STRL

## (undated) DEVICE — SYS COLD/THRP POLAR/CARE/CUBE

## (undated) DEVICE — GOWN,REINFRCE,POLY,SIRUS,BREATH SLV,XXLG: Brand: MEDLINE

## (undated) DEVICE — GLV SURG SENSICARE PI ORTHO SZ8.5 LF STRL

## (undated) DEVICE — PK PROC TURNOVER

## (undated) DEVICE — SYR LUERLOK 20CC BX/50